# Patient Record
Sex: FEMALE | Race: WHITE | NOT HISPANIC OR LATINO | Employment: OTHER | URBAN - METROPOLITAN AREA
[De-identification: names, ages, dates, MRNs, and addresses within clinical notes are randomized per-mention and may not be internally consistent; named-entity substitution may affect disease eponyms.]

---

## 2017-01-30 ENCOUNTER — TELEPHONE (OUTPATIENT)
Dept: RHEUMATOLOGY | Facility: CLINIC | Age: 67
End: 2017-01-30

## 2017-01-30 DIAGNOSIS — M81.0 OSTEOPOROSIS: Primary | ICD-10-CM

## 2017-01-30 NOTE — TELEPHONE ENCOUNTER
----- Message from Maria C Malone sent at 1/30/2017 10:40 AM CST -----  Pt at [phone]//states she canceled her appt scheduled on 12-19-16//is needing to reschedule//please call/thanks/[facility]

## 2017-02-09 ENCOUNTER — OFFICE VISIT (OUTPATIENT)
Dept: RHEUMATOLOGY | Facility: CLINIC | Age: 67
End: 2017-02-09
Payer: MEDICARE

## 2017-02-09 ENCOUNTER — LAB VISIT (OUTPATIENT)
Dept: LAB | Facility: HOSPITAL | Age: 67
End: 2017-02-09
Attending: INTERNAL MEDICINE
Payer: MEDICARE

## 2017-02-09 VITALS
HEART RATE: 58 BPM | BODY MASS INDEX: 33.38 KG/M2 | HEIGHT: 60 IN | WEIGHT: 170 LBS | DIASTOLIC BLOOD PRESSURE: 77 MMHG | SYSTOLIC BLOOD PRESSURE: 148 MMHG

## 2017-02-09 DIAGNOSIS — M81.0 OSTEOPOROSIS: ICD-10-CM

## 2017-02-09 DIAGNOSIS — E55.9 VITAMIN D DEFICIENCY DISEASE: ICD-10-CM

## 2017-02-09 DIAGNOSIS — M81.0 OSTEOPOROSIS: Primary | ICD-10-CM

## 2017-02-09 DIAGNOSIS — Z51.81 MEDICATION MONITORING ENCOUNTER: ICD-10-CM

## 2017-02-09 DIAGNOSIS — Z79.899 HIGH RISK MEDICATION USE: ICD-10-CM

## 2017-02-09 LAB
ALBUMIN SERPL BCP-MCNC: 3.8 G/DL
ALP SERPL-CCNC: 66 U/L
ALT SERPL W/O P-5'-P-CCNC: 21 U/L
ANION GAP SERPL CALC-SCNC: 10 MMOL/L
AST SERPL-CCNC: 19 U/L
BILIRUB SERPL-MCNC: 0.7 MG/DL
BUN SERPL-MCNC: 12 MG/DL
CALCIUM SERPL-MCNC: 9.6 MG/DL
CHLORIDE SERPL-SCNC: 105 MMOL/L
CO2 SERPL-SCNC: 27 MMOL/L
CREAT SERPL-MCNC: 0.8 MG/DL
EST. GFR  (AFRICAN AMERICAN): >60 ML/MIN/1.73 M^2
EST. GFR  (NON AFRICAN AMERICAN): >60 ML/MIN/1.73 M^2
GLUCOSE SERPL-MCNC: 76 MG/DL
POTASSIUM SERPL-SCNC: 4.4 MMOL/L
PROT SERPL-MCNC: 6.8 G/DL
SODIUM SERPL-SCNC: 142 MMOL/L

## 2017-02-09 PROCEDURE — 99213 OFFICE O/P EST LOW 20 MIN: CPT | Mod: PBBFAC,PO | Performed by: PHYSICIAN ASSISTANT

## 2017-02-09 PROCEDURE — 99999 PR PBB SHADOW E&M-EST. PATIENT-LVL III: CPT | Mod: PBBFAC,,, | Performed by: PHYSICIAN ASSISTANT

## 2017-02-09 PROCEDURE — 99214 OFFICE O/P EST MOD 30 MIN: CPT | Mod: S$PBB,,, | Performed by: PHYSICIAN ASSISTANT

## 2017-02-09 PROCEDURE — 96372 THER/PROPH/DIAG INJ SC/IM: CPT | Mod: PBBFAC,PO

## 2017-02-09 RX ADMIN — DENOSUMAB 60 MG: 60 INJECTION SUBCUTANEOUS at 02:02

## 2017-02-09 NOTE — LETTER
February 9, 2017      Brad Hoyos MD  [address] [facility] [address] [place] [address]           [facility] - Rheumatology  [address]  [place] [address]  Phone: [phone]  Fax: [phone]          Patient: Rajni Melchor   MR Number: [id number]   YOB: 1950   Date of Visit: 2/9/2017       Dear Dr. Brad Hoyos:    Thank you for referring Rajni Melchor to me for evaluation. Attached you will find relevant portions of my assessment and plan of care.    If you have questions, please do not hesitate to call me. I look forward to following Rajni Melchor along with you.    Sincerely,    DANA Ortegaosure  CC:  No Recipients    If you would like to receive this communication electronically, please contact [email or web address] or [phone] to request more information on [employer] access.    For providers and/or their staff who would like to refer a patient to [facility], please contact us through our one-stop-shop provider referral line, [facility] [place], at [phone].    If you feel you have received this communication in error or would no longer like to receive these types of communications, please e-mail [email or web address]
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
## 2017-02-09 NOTE — PROGRESS NOTES
Prolia 60mg/cc to  Right lower abdomen. Labs checked: Calcium 9.8, Creatinine0.8 . Pt tolerated well. No acute reaction noted to site. Pt instructed on signs and symptoms of reaction to report. Pt verbalized understanding.     Lot:  [id number]  Exp:  4/19

## 2017-02-09 NOTE — MR AVS SNAPSHOT
[facility] Rheumatology  [address] [place] [address]  Phone: [phone]  Fax: [phone]                  Rajni Melchor   2017 1:00 PM   Office Visit    Description:  Female : 1950   Provider:  Anamika Lorenzo PA-C   Department:  [facility] - Rheumatology           Reason for Visit     Osteoporosis     Vitamin D Deficiency           Diagnoses this Visit        Comments    Osteoporosis    -  Primary     Vitamin D deficiency disease         Medication monitoring encounter                To Do List           Future Appointments        Provider Department Dept Phone    8/15/2017 10:10 AM LABORATORY, [facility] [phone]    8/15/2017 10:30 AM Anamika Lorenzo PA-C [facility] [phone]      Goals (5 Years of Data)     None      [facility] On Call     [facility] On Call Nurse Care Line -  Assistance  Registered nurses in the [facility] Center provide clinical advisement, health education, appointment booking, and other advisory services.  Call for this free service at [phone].             Medications           Message regarding Medications     Verify the changes and/or additions to your medication regime listed below are the same as discussed with your clinician today.  If any of these changes or additions are incorrect, please notify your healthcare provider.        STOP taking these medications     DENOSUMAB (PROLIA SUBQ) Inject into the skin every 6 (six) months.    denosumab (PROLIA) injection 60 mg            Verify that the below list of medications is an accurate representation of the medications you are currently taking.  If none reported, the list may be blank. If incorrect, please contact your healthcare provider. Carry this list with you in case of emergency.           Current Medications     calcium-magnesium-zinc 333-133-5 mg Tab Take 1,200 mg by mouth.    ergocalciferol (ERGOCALCIFEROL) 50,000 unit Cap 1 Capsule by mouth  "weekly    fexofenadine (ALLEGRA) 180 MG tablet 1 Tablet(s) Oral PRN Every day.    fluticasone (FLONASE) 50 mcg/actuation nasal spray 2 sprays by Each Nare route once daily.    levothyroxine (SYNTHROID) 50 MCG tablet Take 1 tablet (50 mcg total) by mouth once daily.           Clinical Reference Information           Your Vitals Were     BP Pulse Height Weight BMI    148/77 58 4' 11.5" (1.511 m) 77.1 kg (169 lb 15.6 oz) 33.76 kg/m2      Blood Pressure          Most Recent Value    BP  (!)  148/77      Allergies as of 2/9/2017     Augmentin [Amoxicillin-pot Clavulanate]    Adhesive      Immunizations Administered on Date of Encounter - 2/9/2017     None      Orders Placed During Today's Visit      Normal Orders This Visit    Prior Authorization Order     Recurring Lab Work Interval Expires    Comprehensive metabolic panel   2/9/2018    Vitamin D   4/10/2018      Instructions      Prolia  Denosumab injection  What is this medicine?  DENOSUMAB (den oh martha mab) slows bone breakdown. Prolia is used to treat osteoporosis in women after menopause and in men. Xgeva is used to prevent bone fractures and other bone problems caused by cancer bone metastases. Xgeva is also used to treat giant cell tumor of the bone.  How should I use this medicine?  This medicine is for injection under the skin. It is given by a health care professional in a hospital or clinic setting.  If you are getting Prolia, a special MedGuide will be given to you by the pharmacist with each prescription and refill. Be sure to read this information carefully each time.  For Prolia, talk to your pediatrician regarding the use of this medicine in children. Special care may be needed. For Xgeva, talk to your pediatrician regarding the use of this medicine in children. While this drug may be prescribed for children as young as 13 years for selected conditions, precautions do apply.  What side effects may I notice from receiving this medicine?  Side effects that " you should report to your doctor or health care professional as soon as possible:  · allergic reactions like skin rash, itching or hives, swelling of the face, lips, or tongue  · breathing problems  · chest pain  · fast, irregular heartbeat  · feeling faint or lightheaded, falls  · fever, chills, or any other sign of infection  · muscle spasms, tightening, or twitches  · numbness or tingling  · skin blisters or bumps, or is dry, peels, or red  · slow healing or unexplained pain in the mouth or jaw  · unusual bleeding or bruising  Side effects that usually do not require medical attention (Report these to your doctor or health care professional if they continue or are bothersome.):  · muscle pain  · stomach upset, gas  What may interact with this medicine?  Do not take this medicine with any of the following medications:  · other medicines containing denosumab  This medicine may also interact with the following medications:  · medicines that suppress the immune system  · medicines that treat cancer  · steroid medicines like prednisone or cortisone  What if I miss a dose?  It is important not to miss your dose. Call your doctor or health care professional if you are unable to keep an appointment.  Where should I keep my medicine?  This medicine is only given in a clinic, doctor's office, or other health care setting and will not be stored at home.  What should I tell my health care provider before I take this medicine?  They need to know if you have any of these conditions:  · dental disease  · eczema  · infection or history of infections  · kidney disease or on dialysis  · low blood calcium or vitamin D  · malabsorption syndrome  · scheduled to have surgery or tooth extraction  · taking medicine that contains denosumab  · thyroid or parathyroid disease  · an unusual reaction to denosumab, other medicines, foods, dyes, or preservatives  · pregnant or trying to get pregnant  · breast-feeding  What should I watch for  while using this medicine?  Visit your doctor or health care professional for regular checks on your progress. Your doctor or health care professional may order blood tests and other tests to see how you are doing.  Call your doctor or health care professional if you get a cold or other infection while receiving this medicine. Do not treat yourself. This medicine may decrease your body's ability to fight infection.  You should make sure you get enough calcium and vitamin D while you are taking this medicine, unless your doctor tells you not to. Discuss the foods you eat and the vitamins you take with your health care professional.  See your dentist regularly. Brush and floss your teeth as directed. Before you have any dental work done, tell your dentist you are receiving this medicine.  Do not become pregnant while taking this medicine or for 5 months after stopping it. Women should inform their doctor if they wish to become pregnant or think they might be pregnant. There is a potential for serious side effects to an unborn child. Talk to your health care professional or pharmacist for more information.  Date Last Reviewed:   NOTE:This sheet is a summary. It may not cover all possible information. If you have questions about this medicine, talk to your doctor, pharmacist, or health care provider. Copyright© 2016 [employer]        Continue Vit D 50,000 IU once weekly  No need to add extra calcium supplement, what you are getting in your diet it adequate               Language Assistance Services     ATTENTION: Language assistance services are available, free of charge. Please call [phone].      ATENCIÓN: Si darrickla zena, tiene a cuellar disposición servicios gratuitos de asistencia lingüística. Llame al [phone].     [facility] Ý: N?u b?n nói Ti?ng Vi?t, có các d?ch v? h? tr? ngôn ng? mi?n phí dành cho b?n. G?i s? [phone].         [facility] - [facility] complies with applicable Federal civil rights laws and  does not discriminate on the basis of race, color, national origin, age, disability, or sex.

## 2017-02-09 NOTE — PATIENT INSTRUCTIONS
Prolia  Denosumab injection  What is this medicine?  DENOSUMAB (den oh martha mab) slows bone breakdown. Prolia is used to treat osteoporosis in women after menopause and in men. Xgeva is used to prevent bone fractures and other bone problems caused by cancer bone metastases. Xgeva is also used to treat giant cell tumor of the bone.  How should I use this medicine?  This medicine is for injection under the skin. It is given by a health care professional in a hospital or clinic setting.  If you are getting Prolia, a special MedGuide will be given to you by the pharmacist with each prescription and refill. Be sure to read this information carefully each time.  For Prolia, talk to your pediatrician regarding the use of this medicine in children. Special care may be needed. For Xgeva, talk to your pediatrician regarding the use of this medicine in children. While this drug may be prescribed for children as young as 13 years for selected conditions, precautions do apply.  What side effects may I notice from receiving this medicine?  Side effects that you should report to your doctor or health care professional as soon as possible:  · allergic reactions like skin rash, itching or hives, swelling of the face, lips, or tongue  · breathing problems  · chest pain  · fast, irregular heartbeat  · feeling faint or lightheaded, falls  · fever, chills, or any other sign of infection  · muscle spasms, tightening, or twitches  · numbness or tingling  · skin blisters or bumps, or is dry, peels, or red  · slow healing or unexplained pain in the mouth or jaw  · unusual bleeding or bruising  Side effects that usually do not require medical attention (Report these to your doctor or health care professional if they continue or are bothersome.):  · muscle pain  · stomach upset, gas  What may interact with this medicine?  Do not take this medicine with any of the following medications:  · other medicines containing denosumab  This medicine  may also interact with the following medications:  · medicines that suppress the immune system  · medicines that treat cancer  · steroid medicines like prednisone or cortisone  What if I miss a dose?  It is important not to miss your dose. Call your doctor or health care professional if you are unable to keep an appointment.  Where should I keep my medicine?  This medicine is only given in a clinic, doctor's office, or other health care setting and will not be stored at home.  What should I tell my health care provider before I take this medicine?  They need to know if you have any of these conditions:  · dental disease  · eczema  · infection or history of infections  · kidney disease or on dialysis  · low blood calcium or vitamin D  · malabsorption syndrome  · scheduled to have surgery or tooth extraction  · taking medicine that contains denosumab  · thyroid or parathyroid disease  · an unusual reaction to denosumab, other medicines, foods, dyes, or preservatives  · pregnant or trying to get pregnant  · breast-feeding  What should I watch for while using this medicine?  Visit your doctor or health care professional for regular checks on your progress. Your doctor or health care professional may order blood tests and other tests to see how you are doing.  Call your doctor or health care professional if you get a cold or other infection while receiving this medicine. Do not treat yourself. This medicine may decrease your body's ability to fight infection.  You should make sure you get enough calcium and vitamin D while you are taking this medicine, unless your doctor tells you not to. Discuss the foods you eat and the vitamins you take with your health care professional.  See your dentist regularly. Brush and floss your teeth as directed. Before you have any dental work done, tell your dentist you are receiving this medicine.  Do not become pregnant while taking this medicine or for 5 months after stopping it. Women  should inform their doctor if they wish to become pregnant or think they might be pregnant. There is a potential for serious side effects to an unborn child. Talk to your health care professional or pharmacist for more information.  Date Last Reviewed:   NOTE:This sheet is a summary. It may not cover all possible information. If you have questions about this medicine, talk to your doctor, pharmacist, or health care provider. Copyright© 2016 [employer]        Continue Vit D 50,000 IU once weekly  No need to add extra calcium supplement, what you are getting in your diet it adequate

## 2017-02-20 ENCOUNTER — LAB VISIT (OUTPATIENT)
Dept: LAB | Facility: HOSPITAL | Age: 67
End: 2017-02-20
Attending: FAMILY MEDICINE
Payer: MEDICARE

## 2017-02-20 ENCOUNTER — OFFICE VISIT (OUTPATIENT)
Dept: INTERNAL MEDICINE | Facility: CLINIC | Age: 67
End: 2017-02-20
Payer: MEDICARE

## 2017-02-20 VITALS
WEIGHT: 171.75 LBS | HEIGHT: 59 IN | DIASTOLIC BLOOD PRESSURE: 76 MMHG | BODY MASS INDEX: 34.62 KG/M2 | TEMPERATURE: 98 F | HEART RATE: 72 BPM | SYSTOLIC BLOOD PRESSURE: 136 MMHG

## 2017-02-20 DIAGNOSIS — E78.5 HYPERLIPIDEMIA, UNSPECIFIED HYPERLIPIDEMIA TYPE: ICD-10-CM

## 2017-02-20 DIAGNOSIS — L98.9 EXTERNAL NASAL LESION: ICD-10-CM

## 2017-02-20 DIAGNOSIS — Z12.31 ENCOUNTER FOR SCREENING MAMMOGRAM FOR BREAST CANCER: ICD-10-CM

## 2017-02-20 DIAGNOSIS — M81.0 OSTEOPOROSIS: ICD-10-CM

## 2017-02-20 DIAGNOSIS — E55.9 VITAMIN D DEFICIENCY DISEASE: ICD-10-CM

## 2017-02-20 DIAGNOSIS — B00.9 HSV (HERPES SIMPLEX VIRUS) INFECTION: ICD-10-CM

## 2017-02-20 DIAGNOSIS — E03.9 HYPOTHYROIDISM, UNSPECIFIED TYPE: ICD-10-CM

## 2017-02-20 DIAGNOSIS — R03.0 ELEVATED BLOOD PRESSURE READING: ICD-10-CM

## 2017-02-20 DIAGNOSIS — B00.9 HSV (HERPES SIMPLEX VIRUS) INFECTION: Primary | ICD-10-CM

## 2017-02-20 LAB
25(OH)D3+25(OH)D2 SERPL-MCNC: 24 NG/ML
ALBUMIN SERPL BCP-MCNC: 3.8 G/DL
ALP SERPL-CCNC: 76 U/L
ALT SERPL W/O P-5'-P-CCNC: 33 U/L
ANION GAP SERPL CALC-SCNC: 6 MMOL/L
AST SERPL-CCNC: 27 U/L
BASOPHILS # BLD AUTO: 0.03 K/UL
BASOPHILS NFR BLD: 0.5 %
BILIRUB SERPL-MCNC: 0.5 MG/DL
BUN SERPL-MCNC: 10 MG/DL
CALCIUM SERPL-MCNC: 9 MG/DL
CHLORIDE SERPL-SCNC: 109 MMOL/L
CHOLEST/HDLC SERPL: 2.4 {RATIO}
CO2 SERPL-SCNC: 27 MMOL/L
CREAT SERPL-MCNC: 0.9 MG/DL
DIFFERENTIAL METHOD: NORMAL
EOSINOPHIL # BLD AUTO: 0.2 K/UL
EOSINOPHIL NFR BLD: 3.5 %
ERYTHROCYTE [DISTWIDTH] IN BLOOD BY AUTOMATED COUNT: 13 %
EST. GFR  (AFRICAN AMERICAN): >60 ML/MIN/1.73 M^2
EST. GFR  (NON AFRICAN AMERICAN): >60 ML/MIN/1.73 M^2
GLUCOSE SERPL-MCNC: 84 MG/DL
HCT VFR BLD AUTO: 42.8 %
HDL/CHOLESTEROL RATIO: 41.7 %
HDLC SERPL-MCNC: 199 MG/DL
HDLC SERPL-MCNC: 83 MG/DL
HGB BLD-MCNC: 14 G/DL
LDLC SERPL CALC-MCNC: 105.6 MG/DL
LYMPHOCYTES # BLD AUTO: 1.7 K/UL
LYMPHOCYTES NFR BLD: 28.8 %
MCH RBC QN AUTO: 30.9 PG
MCHC RBC AUTO-ENTMCNC: 32.7 %
MCV RBC AUTO: 95 FL
MONOCYTES # BLD AUTO: 0.3 K/UL
MONOCYTES NFR BLD: 5.4 %
NEUTROPHILS # BLD AUTO: 3.6 K/UL
NEUTROPHILS NFR BLD: 61.6 %
NONHDLC SERPL-MCNC: 116 MG/DL
PLATELET # BLD AUTO: 209 K/UL
PMV BLD AUTO: 10.4 FL
POTASSIUM SERPL-SCNC: 5.2 MMOL/L
PROT SERPL-MCNC: 7.3 G/DL
RBC # BLD AUTO: 4.53 M/UL
SODIUM SERPL-SCNC: 142 MMOL/L
T4 FREE SERPL-MCNC: 1.26 NG/DL
TRIGL SERPL-MCNC: 52 MG/DL
TSH SERPL DL<=0.005 MIU/L-ACNC: 2.6 UIU/ML
WBC # BLD AUTO: 5.76 K/UL

## 2017-02-20 PROCEDURE — 80053 COMPREHEN METABOLIC PANEL: CPT

## 2017-02-20 PROCEDURE — 99999 PR PBB SHADOW E&M-EST. PATIENT-LVL III: CPT | Mod: PBBFAC,,, | Performed by: FAMILY MEDICINE

## 2017-02-20 PROCEDURE — 36415 COLL VENOUS BLD VENIPUNCTURE: CPT | Mod: PO

## 2017-02-20 PROCEDURE — 85025 COMPLETE CBC W/AUTO DIFF WBC: CPT

## 2017-02-20 PROCEDURE — 80061 LIPID PANEL: CPT

## 2017-02-20 PROCEDURE — 82306 VITAMIN D 25 HYDROXY: CPT

## 2017-02-20 PROCEDURE — 99214 OFFICE O/P EST MOD 30 MIN: CPT | Mod: S$PBB,,, | Performed by: FAMILY MEDICINE

## 2017-02-20 PROCEDURE — 84443 ASSAY THYROID STIM HORMONE: CPT

## 2017-02-20 PROCEDURE — 84439 ASSAY OF FREE THYROXINE: CPT

## 2017-02-20 RX ORDER — ACYCLOVIR 400 MG/1
400 TABLET ORAL
Qty: 60 TABLET | Refills: 3 | Status: SHIPPED | OUTPATIENT
Start: 2017-02-20 | End: 2019-05-20 | Stop reason: SDUPTHER
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
## 2017-03-16 ENCOUNTER — PATIENT OUTREACH (OUTPATIENT)
Dept: ADMINISTRATIVE | Facility: HOSPITAL | Age: 67
End: 2017-03-16

## 2017-03-16 NOTE — PROGRESS NOTES
CHART AUDIT COMPLETED. PORTAL MESSAGE SENT TO PATIENT TO INFORM HIM OF OVERDUE HEALTH MAINTENANCE.

## 2017-03-27 ENCOUNTER — OFFICE VISIT (OUTPATIENT)
Dept: INTERNAL MEDICINE | Facility: CLINIC | Age: 67
End: 2017-03-27
Payer: MEDICARE

## 2017-03-27 VITALS
SYSTOLIC BLOOD PRESSURE: 112 MMHG | TEMPERATURE: 97 F | WEIGHT: 168.19 LBS | DIASTOLIC BLOOD PRESSURE: 70 MMHG | HEART RATE: 90 BPM | HEIGHT: 60 IN | BODY MASS INDEX: 33.02 KG/M2

## 2017-03-27 DIAGNOSIS — E78.5 HYPERLIPIDEMIA, UNSPECIFIED HYPERLIPIDEMIA TYPE: ICD-10-CM

## 2017-03-27 DIAGNOSIS — M81.0 OSTEOPOROSIS: ICD-10-CM

## 2017-03-27 DIAGNOSIS — E55.9 VITAMIN D DEFICIENCY DISEASE: ICD-10-CM

## 2017-03-27 DIAGNOSIS — E03.9 HYPOTHYROIDISM, UNSPECIFIED TYPE: ICD-10-CM

## 2017-03-27 DIAGNOSIS — M46.1 SACROILIITIS: Primary | ICD-10-CM

## 2017-03-27 PROCEDURE — 99213 OFFICE O/P EST LOW 20 MIN: CPT | Mod: PBBFAC,PO | Performed by: FAMILY MEDICINE

## 2017-03-27 PROCEDURE — 99999 PR PBB SHADOW E&M-EST. PATIENT-LVL III: CPT | Mod: PBBFAC,,, | Performed by: FAMILY MEDICINE

## 2017-03-27 PROCEDURE — 99214 OFFICE O/P EST MOD 30 MIN: CPT | Mod: S$PBB,,, | Performed by: FAMILY MEDICINE

## 2017-03-27 RX ORDER — LEVOTHYROXINE SODIUM 50 UG/1
50 TABLET ORAL DAILY
Qty: 90 TABLET | Refills: 3 | Status: SHIPPED | OUTPATIENT
Start: 2017-03-27 | End: 2018-02-14 | Stop reason: SDUPTHER

## 2017-03-27 RX ORDER — ACETAMINOPHEN 500 MG
1 TABLET ORAL DAILY
Qty: 100 CAPSULE | Refills: 3 | Status: SHIPPED | OUTPATIENT
Start: 2017-03-27

## 2017-03-27 NOTE — MR AVS SNAPSHOT
[facility]  [address] [place] [address]  Phone: [phone]  Fax: [phone]                  Rajni Melchor   3/27/2017 10:00 AM   Office Visit    Description:  Female : 1950   Provider:  Kylah Benedict MD   [facility]           Reason for Visit     Medicare AWV     Sciatica     Leg Pain           Diagnoses this Visit        Comments    Hypothyroidism, unspecified type    -  Primary stable, labs reviewed. cont with meds.     Vitamin D deficiency disease     no change at 24. start vit D 3, 2502-5371 daily.  labs reviewed.     Osteoporosis     continue with prolia. continue with walking.     Hyperlipidemia, unspecified hyperlipidemia type                To Do List           Future Appointments        Provider Department Dept Phone    3/27/2017 10:00 AM [facility]ylah Benedict [facility] [phone]    8/15/2017 8:00 AM [place] MAMMO1-SCR [facility] [phone]    8/15/2017 9:30 AM [facility] BMD1 [facility] [phone]    8/15/2017 10:10 AM LAB[place]AT[place], [place] [facility] [phone]    8/15/2017 10:30 AM Anamika Lorenzo PA-C [facility] [phone]      Goals (5 Years of Data)     None      Follow-Up and Disposition     Return in about 1 year (around 3/27/2018) for physical.       These Medications        Disp Refills Start End    levothyroxine (SYNTHROID) 50 MCG tablet 90 tablet 3 3/27/2017 3/27/2018    Take 1 tablet (50 mcg total) by mouth once daily. - Oral    Pharmacy: [facility] [address] Ph #: [phone]       cholecalciferol, vitamin D3, (VITAMIN D3) 2,000 unit Cap 100 capsule 3 3/27/2017     Take 1 capsule (2,000 Units total) by mouth once daily. - Oral    Pharmacy: [facility] [place], [place] - [address] Ph #: [phone]         [place] On Call     [facility] Care Line -   "Assistance  Registered nurses in the [facility] provide clinical advisement, health education, appointment booking, and other advisory services.  Call for this free service at [phone].             Medications           Message regarding Medications     Verify the changes and/or additions to your medication regime listed below are the same as discussed with your clinician today.  If any of these changes or additions are incorrect, please notify your healthcare provider.        START taking these NEW medications        Refills    cholecalciferol, vitamin D3, (VITAMIN D3) 2,000 unit Cap 3    Sig: Take 1 capsule (2,000 Units total) by mouth once daily.    Class: Normal    Route: Oral      STOP taking these medications     ergocalciferol (ERGOCALCIFEROL) 50,000 unit Cap 1 Capsule by mouth weekly           Verify that the below list of medications is an accurate representation of the medications you are currently taking.  If none reported, the list may be blank. If incorrect, please contact your healthcare provider. Carry this list with you in case of emergency.           Current Medications     acyclovir (ZOVIRAX) 400 MG tablet Take 1 tablet (400 mg total) by mouth 5 (five) times daily. For 3 days for fever blisters    calcium-magnesium-zinc 333-133-5 mg Tab Take 1,200 mg by mouth.    cholecalciferol, vitamin D3, (VITAMIN D3) 2,000 unit Cap Take 1 capsule (2,000 Units total) by mouth once daily.    fexofenadine (ALLEGRA) 180 MG tablet 1 Tablet(s) Oral PRN Every day.    fluticasone (FLONASE) 50 mcg/actuation nasal spray 2 sprays by Each Nare route once daily.    levothyroxine (SYNTHROID) 50 MCG tablet Take 1 tablet (50 mcg total) by mouth once daily.           Clinical Reference Information           Your Vitals Were     BP Pulse Temp Height Weight BMI    112/70 90 97.4 °F (36.3 °C) 4' 11.5" (1.511 m) 76.3 kg (168 lb 3.4 oz) 33.41 kg/m2      Blood Pressure          Most Recent Value    BP  112/70    "   Allergies as of 3/27/2017     Augmentin [Amoxicillin-pot Clavulanate]    Adhesive      Immunizations Administered on Date of Encounter - 3/27/2017     None      Orders Placed During Today's Visit     Future Labs/Procedures Expected by Expires    CBC auto differential  3/27/2018 5/1/2018    Comprehensive metabolic panel  3/27/2018 5/1/2018    Lipid panel  3/27/2018 5/1/2018    T4, free  3/27/2018 5/1/2018    TSH  3/27/2018 5/1/2018    Vitamin D  3/27/2018 (Approximate) 5/1/2018      Language Assistance Services     ATTENTION: Language assistance services are available, free of charge. Please call [phone].      ATENCIÓN: Si habla zena, tiene a cuellar disposición servicios gratuitos de asistencia lingüística. Llame al [phone].     CHÚ Ý: N?u b?n nói Ti?ng Vi?t, có các d?ch v? h? tr? ngôn ng? mi?n phí dành cho b?n. G?i s? [phone].         [facility] complies with applicable Federal civil rights laws and does not discriminate on the basis of race, color, national origin, age, disability, or sex.

## 2017-03-28 ENCOUNTER — TELEPHONE (OUTPATIENT)
Dept: INTERNAL MEDICINE | Facility: CLINIC | Age: 67
End: 2017-03-28

## 2017-03-28 PROBLEM — M46.1 SACROILIITIS: Status: ACTIVE | Noted: 2017-03-28

## 2017-03-28 PROBLEM — E78.5 HYPERLIPIDEMIA: Status: ACTIVE | Noted: 2017-03-28

## 2017-03-28 RX ORDER — PERMETHRIN 50 MG/G
CREAM TOPICAL
Qty: 60 G | Refills: 1 | Status: SHIPPED | OUTPATIENT
Start: 2017-03-28 | End: 2017-08-15

## 2017-03-28 NOTE — TELEPHONE ENCOUNTER
Pt is stating that she was around her grandson who has now been diagnosed with scabies. She is wanting to know if she needs to do preventative treatment or what she needs to do?

## 2017-03-28 NOTE — TELEPHONE ENCOUNTER
----- Message from Joseang Douglasay sent at 3/28/2017  3:37 PM CDT -----  Contact: Patient  Please call pt back regarding questions on how to proceed. Pt states that she was exposed to her grandson who has scabies and needs to know if something needs to be called in or she needs an appt.  Please call pt back @ ..[phone] (home) Thank you/NH

## 2017-03-29 NOTE — PROGRESS NOTES
Subjective:      Patient ID: Rajni Melchor is a 67 y.o. female.    Chief Complaint: Medicare AWV; Sciatica; and Leg Pain (left)    HPI Comments: Patient's coming in today for follow-up of chronic issues.  Since I last saw her she's been walking a bit more.  Try to get about 10,000 steps.  She does report that her lower back and hurting her some with some SI joint pain.  If she massages it seems to get better.  She's actually down 3 pounds since I last saw her.  She does report when she lays on her left side sometimes she gets some pain going down her leg otherwise if she massages it because way.  She also occasionally describes a picking feeling and feeling sometimes a little bit more shaky.    From a vitamin D standpoint she's currently doing 5000 units a day.  She does have a history of osteoporosis.  She's trying to get back more exercise.    She also normally gets a good bit of sun exposure but because of her fever blister she's been trying to limit this.    She also has a history of hypothyroidism for which we are reviewing labs today.  She's currently stable with her current supplementation.    Leg Pain          Lab Results   Component Value Date    WBC 5.76 02/20/2017    HGB 14.0 02/20/2017    HCT 42.8 02/20/2017     02/20/2017    CHOL 199 02/20/2017    TRIG 52 02/20/2017    HDL 83 (H) 02/20/2017    ALT 33 02/20/2017    AST 27 02/20/2017     02/20/2017    K 5.2 (H) 02/20/2017     02/20/2017    CREATININE 0.9 02/20/2017    BUN 10 02/20/2017    CO2 27 02/20/2017    TSH 2.603 02/20/2017    HGBA1C 5.5 01/23/2013       Review of Systems   Constitutional: Negative for chills, fatigue and fever.   HENT: Negative for ear pain and trouble swallowing.    Eyes: Negative for pain and visual disturbance.   Respiratory: Negative for cough and shortness of breath.    Cardiovascular: Negative for chest pain and leg swelling.   Gastrointestinal: Negative for abdominal pain, blood in stool, nausea and  vomiting.   Endocrine: Negative for cold intolerance and heat intolerance.   Genitourinary: Negative for dysuria and frequency.   Musculoskeletal: Positive for arthralgias and back pain. Negative for joint swelling, myalgias and neck pain.   Skin: Negative for color change and rash.   Neurological: Negative for dizziness and headaches.   Psychiatric/Behavioral: Negative for behavioral problems and sleep disturbance.     Objective:     Physical Exam   Constitutional: She is oriented to person, place, and time. She appears well-developed and well-nourished.   HENT:   Head: Normocephalic and atraumatic.   Right Ear: External ear normal.   Left Ear: External ear normal.   Mouth/Throat: Oropharynx is clear and moist.   Eyes: EOM are normal.   Neck: Normal range of motion. Neck supple. No thyromegaly present.   Cardiovascular: Normal rate and regular rhythm.  Exam reveals no gallop and no friction rub.    No murmur heard.  Pulmonary/Chest: Effort normal. No respiratory distress. She has no wheezes. She has no rales.   Abdominal: Soft. Bowel sounds are normal. She exhibits no distension. There is no tenderness. There is no rebound.   Musculoskeletal: Normal range of motion. She exhibits no edema.        Lumbar back: She exhibits tenderness, pain and spasm. She exhibits normal range of motion and no bony tenderness.        Back:    Lymphadenopathy:     She has no cervical adenopathy.   Neurological: She is alert and oriented to person, place, and time.   Skin: Skin is warm and dry. No rash noted.   Psychiatric: She has a normal mood and affect. Her behavior is normal. Judgment and thought content normal.   Vitals reviewed.    Assessment:     1. Sacroiliitis    2. Hypothyroidism, unspecified type    3. Vitamin D deficiency disease    4. Osteoporosis    5. Hyperlipidemia, unspecified hyperlipidemia type      Plan:   Rajni was seen today for medicare awv, sciatica and leg pain.    Diagnoses and all orders for this  visit:    Sacroiliitis-new, recurrent advised patient continue working on walking and massage probably aggravated most recently due to the walking.  Continue to work on weight loss.  Can use an occasional anti-inflammatory for necessary.  Demonstrated stretching exercises.    Hypothyroidism, unspecified type  Comments:  stable, labs reviewed. cont with meds.   Orders:  -     Vitamin D; Future  -     TSH; Future  -     T4, free; Future  -     Comprehensive metabolic panel; Future  -     CBC auto differential; Future  -     Lipid panel; Future    Vitamin D deficiency disease  Comments:  no change at 24. start vit D 3, 3627-9130 daily.  labs reviewed.   Orders:  -     Vitamin D; Future  -     TSH; Future  -     T4, free; Future  -     Comprehensive metabolic panel; Future  -     CBC auto differential; Future  -     Lipid panel; Future    Osteoporosis  Comments:  continue with prolia. continue with walking.   Orders:  -     Vitamin D; Future  -     TSH; Future  -     T4, free; Future  -     Comprehensive metabolic panel; Future  -     CBC auto differential; Future  -     Lipid panel; Future    Hyperlipidemia, unspecified hyperlipidemia type-schedule labs, discussed health maintenance issues and working on diet therapy  -     Vitamin D; Future  -     TSH; Future  -     T4, free; Future  -     Comprehensive metabolic panel; Future  -     CBC auto differential; Future  -     Lipid panel; Future    Other orders  -     levothyroxine (SYNTHROID) 50 MCG tablet; Take 1 tablet (50 mcg total) by mouth once daily.  -     cholecalciferol, vitamin D3, (VITAMIN D3) 2,000 unit Cap; Take 1 capsule (2,000 Units total) by mouth once daily.            Return in about 1 year (around 3/27/2018) for physical.

## 2017-07-13 DIAGNOSIS — J30.9 ALLERGIC RHINITIS: ICD-10-CM

## 2017-07-13 RX ORDER — FLUTICASONE PROPIONATE 50 MCG
SPRAY, SUSPENSION (ML) NASAL
Qty: 16 G | Refills: 3 | Status: SHIPPED | OUTPATIENT
Start: 2017-07-13 | End: 2018-07-23 | Stop reason: SDUPTHER

## 2017-07-26 ENCOUNTER — NURSE TRIAGE (OUTPATIENT)
Dept: ADMINISTRATIVE | Facility: CLINIC | Age: 67
End: 2017-07-26

## 2017-07-26 ENCOUNTER — HOSPITAL ENCOUNTER (OUTPATIENT)
Dept: RADIOLOGY | Facility: HOSPITAL | Age: 67
Discharge: HOME | End: 2017-07-26
Attending: NURSE PRACTITIONER
Payer: MEDICARE

## 2017-07-26 ENCOUNTER — OFFICE VISIT (OUTPATIENT)
Dept: URGENT CARE | Facility: CLINIC | Age: 67
End: 2017-07-26
Payer: MEDICARE

## 2017-07-26 VITALS
OXYGEN SATURATION: 98 % | TEMPERATURE: 98 F | HEIGHT: 60 IN | SYSTOLIC BLOOD PRESSURE: 124 MMHG | DIASTOLIC BLOOD PRESSURE: 78 MMHG | WEIGHT: 171.31 LBS | HEART RATE: 66 BPM | BODY MASS INDEX: 33.63 KG/M2

## 2017-07-26 DIAGNOSIS — M46.1 SACROILIITIS: Primary | ICD-10-CM

## 2017-07-26 DIAGNOSIS — M46.1 SACROILIITIS: ICD-10-CM

## 2017-07-26 PROCEDURE — 72100 X-RAY EXAM L-S SPINE 2/3 VWS: CPT | Mod: 26,,, | Performed by: RADIOLOGY

## 2017-07-26 PROCEDURE — 72100 X-RAY EXAM L-S SPINE 2/3 VWS: CPT | Mod: TC,PO

## 2017-07-26 PROCEDURE — 1125F AMNT PAIN NOTED PAIN PRSNT: CPT | Mod: ,,, | Performed by: NURSE PRACTITIONER

## 2017-07-26 PROCEDURE — 99214 OFFICE O/P EST MOD 30 MIN: CPT | Mod: 25,S$PBB,, | Performed by: NURSE PRACTITIONER

## 2017-07-26 PROCEDURE — 1159F MED LIST DOCD IN RCRD: CPT | Mod: ,,, | Performed by: NURSE PRACTITIONER

## 2017-07-26 PROCEDURE — 99999 PR PBB SHADOW E&M-EST. PATIENT-LVL V: CPT | Mod: PBBFAC,,, | Performed by: NURSE PRACTITIONER

## 2017-07-26 RX ORDER — METHYLPREDNISOLONE 4 MG/1
TABLET ORAL
Qty: 1 PACKAGE | Refills: 0 | Status: SHIPPED | OUTPATIENT
Start: 2017-07-26 | End: 2017-08-07

## 2017-07-26 RX ORDER — KETOROLAC TROMETHAMINE 30 MG/ML
30 INJECTION, SOLUTION INTRAMUSCULAR; INTRAVENOUS
Status: COMPLETED | OUTPATIENT
Start: 2017-07-26 | End: 2017-07-26

## 2017-07-26 RX ADMIN — KETOROLAC TROMETHAMINE 30 MG: 30 INJECTION, SOLUTION INTRAMUSCULAR at 10:07

## 2017-07-26 NOTE — PROGRESS NOTES
"Subjective:       Patient ID: Rajni Melchor is a 67 y.o. female.    Chief Complaint: Hip Pain    HPI  Rajni presents to urgent care with complaints of left lower back pain which radiates down the left hip to the left ankle. This has been a chronic issue but it flared up again two weeks ago when she was dancing with her granddaughter. Her left knee is also hurting. She thinks she may have twisted it at that time. There is no pain with walking and no gait disturbance.   /78 (BP Location: Left arm, Patient Position: Sitting, BP Method: Manual)   Pulse 66   Temp 97.5 °F (36.4 °C) (Tympanic)   Ht 4' 11.5" (1.511 m)   Wt 77.7 kg (171 lb 4.8 oz)   SpO2 98%   BMI 34.02 kg/m²     Review of Systems   Constitutional: Negative for chills and fever.   HENT: Negative for congestion.    Respiratory: Negative for cough and shortness of breath.    Gastrointestinal: Negative for nausea and vomiting.   Musculoskeletal: Positive for back pain. Negative for gait problem, joint swelling and neck pain.   Skin: Negative for rash and wound.   Allergic/Immunologic: Negative for immunocompromised state.   Neurological: Negative for headaches.   Hematological: Does not bruise/bleed easily.   Psychiatric/Behavioral: Negative for behavioral problems and confusion.       Objective:      Physical Exam   Constitutional: She is oriented to person, place, and time. She appears well-developed and well-nourished.   Eyes: Conjunctivae and EOM are normal.   Neck: Normal range of motion.   Cardiovascular: Normal rate.    Pulmonary/Chest: Effort normal. No respiratory distress.   Musculoskeletal: Normal range of motion. She exhibits tenderness. She exhibits no edema or deformity.        Left knee: She exhibits normal range of motion, no swelling, no effusion, no erythema and normal alignment. Tenderness found. Medial joint line tenderness noted.        Lumbar back: She exhibits tenderness and pain. She exhibits no bony tenderness, no " swelling and no edema.        Back:    There is pain with knee flexion   Neurological: She is alert and oriented to person, place, and time.   Skin: Skin is warm and dry.   Psychiatric: She has a normal mood and affect. Her behavior is normal.   Vitals reviewed.      Assessment:       1. Sacroiliitis        Plan:       Rajni was seen today for hip pain.    Diagnoses and all orders for this visit:    Sacroiliitis  -     X-Ray Lumbar Spine Complete 5 View; Future  -     ketorolac injection 30 mg; Inject 1 mL (30 mg total) into the muscle one time.  -     methylPREDNISolone (MEDROL DOSEPACK) 4 mg tablet; use as directed  -     Ambulatory Referral to Physiatry          -  Use heating pad to back for 20-30 minutes several times a day. May alternate with ice depending on what provides the best relief.  -   May try ower back exercises and stretches and gentle massage.  -   Avoid heavy lifting or any activities that aggravate back pain but continue to walk and avoid long periods of sitting.  Use knee sleeve from drug store. If not improving may need ortho consult/ MRI   Follow up with PCP in 2 weeks if no improvement or sooner if pain worsens.  Go to ER immediately if bowel/bladder incontinence, numbness/tingling in lower extremities, urinary retention or bilateral leg weakness.

## 2017-07-26 NOTE — TELEPHONE ENCOUNTER
Reason for Disposition   DOUBLE DOSE (an extra dose or lesser amount) of prescription drug and NO symptoms   [1] DOUBLE DOSE (an extra dose or lesser amount) of prescription drug AND [2] NO symptoms (Exception: a double dose of antibiotics)    Answer Assessment - Initial Assessment Questions  Pt was prescribed medrol dose pack. She just got home from dr's office and didn't read the directions. Took all 6 tabs for first day at one time. She called pharmacist who referred her to poison control so she called [facility].    Protocols used: ST POISONING-A-OH, ST MEDICATION QUESTION CALL-A-AH    Advised to f/u with call to poison control and that I would send message to her dr for any further recommendations.

## 2017-07-26 NOTE — TELEPHONE ENCOUNTER
Spoke with patient and told her everything is fine. Dose only equivalent to prednisone 30 mg. Patient verbalizes understanding and is feeling fine.

## 2017-07-26 NOTE — PATIENT INSTRUCTIONS
Possible Causes of Low Back or Leg Pain    The symptoms in your back or leg may be due to pressure on a nerve. This pressure may be caused by a damaged disk or by abnormal bone growth. Either way, you may feel pain, burning, tingling, or numbness. If you have pressure on a nerve that connects to the sciatic nerve, pain may shoot down your leg.    Pressure from the disk  Constant wear and tear can weaken a disk over time and cause back pain. The disk can then be damaged by a sudden movement or injury. If its soft center begins to bulge, the disk may press on a nerve. Or the outside of the disk may tear, and the soft center may squeeze through and pinch a nerve.    Pressure from bone  As a disk wears out, the vertebrae right above and below the disk begin to touch. This can put pressure on a nerve. Often, abnormal bone (called bone spurs) grows where the vertebrae rub against each other. This can cause the foramen or the spinal canal to narrow (called stenosis) and press against a nerve.  Date Last Reviewed: 10/4/2015  © 8879-0145 [employer]. [address] [place] [address]. All rights reserved. This information is not intended as a substitute for professional medical care. Always follow your healthcare professional's instructions.        Possible Causes of Low Back or Leg Pain    The symptoms in your back or leg may be due to pressure on a nerve. This pressure may be caused by a damaged disk or by abnormal bone growth. Either way, you may feel pain, burning, tingling, or numbness. If you have pressure on a nerve that connects to the sciatic nerve, pain may shoot down your leg.    Pressure from the disk  Constant wear and tear can weaken a disk over time and cause back pain. The disk can then be damaged by a sudden movement or injury. If its soft center begins to bulge, the disk may press on a nerve. Or the outside of the disk may tear, and the soft center may squeeze through and pinch a  nerve.    Pressure from bone  As a disk wears out, the vertebrae right above and below the disk begin to touch. This can put pressure on a nerve. Often, abnormal bone (called bone spurs) grows where the vertebrae rub against each other. This can cause the foramen or the spinal canal to narrow (called stenosis) and press against a nerve.  Date Last Reviewed: 10/4/2015  © 1929-1930 [employer]. [address] [place] [address], [place] [address]. All rights reserved. This information is not intended as a substitute for professional medical care. Always follow your healthcare professional's instructions.

## 2017-07-30 ENCOUNTER — PATIENT MESSAGE (OUTPATIENT)
Dept: INTERNAL MEDICINE | Facility: CLINIC | Age: 67
End: 2017-07-30

## 2017-08-07 ENCOUNTER — INITIAL CONSULT (OUTPATIENT)
Dept: PHYSICAL MEDICINE AND REHAB | Facility: CLINIC | Age: 67
End: 2017-08-07
Payer: MEDICARE

## 2017-08-07 VITALS
BODY MASS INDEX: 33.57 KG/M2 | DIASTOLIC BLOOD PRESSURE: 64 MMHG | SYSTOLIC BLOOD PRESSURE: 116 MMHG | HEIGHT: 60 IN | RESPIRATION RATE: 14 BRPM | WEIGHT: 171 LBS | HEART RATE: 70 BPM

## 2017-08-07 DIAGNOSIS — M47.816 SPONDYLOSIS OF LUMBAR REGION WITHOUT MYELOPATHY OR RADICULOPATHY: ICD-10-CM

## 2017-08-07 DIAGNOSIS — M51.36 DDD (DEGENERATIVE DISC DISEASE), LUMBAR: ICD-10-CM

## 2017-08-07 DIAGNOSIS — M46.1 SACROILIITIS: Primary | ICD-10-CM

## 2017-08-07 PROCEDURE — 1125F AMNT PAIN NOTED PAIN PRSNT: CPT | Mod: ,,, | Performed by: PHYSICAL MEDICINE & REHABILITATION

## 2017-08-07 PROCEDURE — 99204 OFFICE O/P NEW MOD 45 MIN: CPT | Mod: S$PBB,,, | Performed by: PHYSICAL MEDICINE & REHABILITATION

## 2017-08-07 PROCEDURE — 1159F MED LIST DOCD IN RCRD: CPT | Mod: ,,, | Performed by: PHYSICAL MEDICINE & REHABILITATION

## 2017-08-07 PROCEDURE — 99213 OFFICE O/P EST LOW 20 MIN: CPT | Mod: PBBFAC,PO | Performed by: PHYSICAL MEDICINE & REHABILITATION

## 2017-08-07 PROCEDURE — 99999 PR PBB SHADOW E&M-EST. PATIENT-LVL III: CPT | Mod: PBBFAC,,, | Performed by: PHYSICAL MEDICINE & REHABILITATION

## 2017-08-07 RX ORDER — IBUPROFEN 800 MG/1
800 TABLET ORAL 2 TIMES DAILY PRN
Qty: 60 TABLET | Refills: 1 | Status: SHIPPED | OUTPATIENT
Start: 2017-08-07 | End: 2020-11-09

## 2017-08-07 NOTE — PATIENT INSTRUCTIONS
Possible Causes of Low Back or Leg Pain    The symptoms in your back or leg may be due to pressure on a nerve. This pressure may be caused by a damaged disk or by abnormal bone growth. Either way, you may feel pain, burning, tingling, or numbness. If you have pressure on a nerve that connects to the sciatic nerve, pain may shoot down your leg.    Pressure from the disk  Constant wear and tear can weaken a disk over time and cause back pain. The disk can then be damaged by a sudden movement or injury. If its soft center begins to bulge, the disk may press on a nerve. Or the outside of the disk may tear, and the soft center may squeeze through and pinch a nerve.    Pressure from bone  As a disk wears out, the vertebrae right above and below the disk begin to touch. This can put pressure on a nerve. Often, abnormal bone (called bone spurs) grows where the vertebrae rub against each other. This can cause the foramen or the spinal canal to narrow (called stenosis) and press against a nerve.  Date Last Reviewed: 10/4/2015  © 3938-6734 [employer]. [address] [place] [address]. All rights reserved. This information is not intended as a substitute for professional medical care. Always follow your healthcare professional's instructions.        Possible Causes of Low Back or Leg Pain    The symptoms in your back or leg may be due to pressure on a nerve. This pressure may be caused by a damaged disk or by abnormal bone growth. Either way, you may feel pain, burning, tingling, or numbness. If you have pressure on a nerve that connects to the sciatic nerve, pain may shoot down your leg.    Pressure from the disk  Constant wear and tear can weaken a disk over time and cause back pain. The disk can then be damaged by a sudden movement or injury. If its soft center begins to bulge, the disk may press on a nerve. Or the outside of the disk may tear, and the soft center may squeeze through and pinch a  nerve.    Pressure from bone  As a disk wears out, the vertebrae right above and below the disk begin to touch. This can put pressure on a nerve. Often, abnormal bone (called bone spurs) grows where the vertebrae rub against each other. This can cause the foramen or the spinal canal to narrow (called stenosis) and press against a nerve.  Date Last Reviewed: 10/4/2015  © 9157-6323 [employer]. [address] [place] [address]. All rights reserved. This information is not intended as a substitute for professional medical care. Always follow your healthcare professional's instructions.        Causes of Lumbar (Low Back) Pain  Low back pain can be caused by problems with any part of the lumbar spine. A disk can herniate (push out) and press on a nerve. Vertebrae can rub against each other or slip out of place. This can irritate facet joints and nerves. It can also lead to stenosis, a narrowing of the spinal canal or foramen.  Pressure from a disk  Constant wear and tear on a disk can cause it to weaken and push outward. Part of the disk may then press on nearby nerves. There are two common types of herniated disks:  Contained means the soft nucleus is protruding outward.   Extruded means the firm annulus has torn, letting the soft center squeeze through.     Pressure from bone  An unstable spine   With age, a disk may thin and wear out. Vertebrae above and below the disk may begin to touch. This can put pressure on nerves. It can also cause bone spurs (growths) to form where the bones rub together.    Stenosis results when bone spurs narrow the foramen or spinal canal. This also puts pressure on nerves. Slipping vertebrae can irritate nerves and joints. They can also worsen stenosis.    In some cases, vertebrae become unstable and slip forward. This is called spondylolisthesis.     Date Last Reviewed: 10/12/2015  © 6194-9463 [employer]. [address] [place] [address]. All  rights reserved. This information is not intended as a substitute for professional medical care. Always follow your healthcare professional's instructions.        Relieving Back Pain  Back pain is a common problem. You can strain back muscles by lifting too much weight or just by moving the wrong way. Back strain can be uncomfortable, even painful. And it can take weeks or months to improve. To help yourself feel better and prevent future back strains, try these tips.  Important Note: Do not give aspirin to children or teens without first discussing it with your healthcare provider.      ? Ice    Ice reduces muscle pain and swelling. It helps most during the first 24 to 48 hours after an injury.  · Wrap an ice pack or a bag of frozen peas in a thin towel. (Never place ice directly on your skin.)  · Place the ice where your back hurts the most.  · Dont ice for more than 20 minutes at a time.  · You can use ice several times a day.  ? Medicines  Over-the-counter pain relievers can include acetaminophen and anti-inflammatory medicines, which includes aspirin or ibuprofen. They can help ease discomfort. Some also reduce swelling.  · Tell your healthcare provider about any medicines you are already taking.  · Take medicines only as directed.  ? Heat  After the first 48 hours, heat can relax sore muscles and improve blood flow.  · Try a warm bath or shower. Or use a heating pad set on low. To prevent a burn, keep a cloth between you and the heating pad.  · Dont use a heating pad for more than 15 minutes at a time. Never sleep on a heating pad.  Date Last Reviewed: 9/1/2015 © 2000-2016 [employer]. [address] [place] [address], [place] [address]. All rights reserved. This information is not intended as a substitute for professional medical care. Always follow your healthcare professional's instructions.        Back Safety: Poor Posture Hurts  An unhealthy spine often starts with bad habits. Poor movement  patterns and posture problems are common causes of back pain. Disk, bone, nerve, and soft tissue problems can all be affected by poor posture. They can lead to pain, stiffness, and other symptoms.    Poor posture backfires  Poor posture can cause pain. Too much slouching puts increased pressure on the disks. An excessive lumbar curve can overload and inflame the vertebrae. As a result, the back muscles may tighten or spasm to splint and protect the spine. This adds to the pain you feel.    Proper posture: The key to safe movement  Your spine bears your weight throughout the day. This is true whether youre sleeping, standing, or bending. Certain positions strain your spine more than others. But by maintaining proper posture in all positions, you can reduce the stress on your spine.       To improve your standing posture, follow these steps:  · Breathe deeply.  · Relax your shoulders, hips, and ankles. · Think of the ears, shoulders, hips, and ankles as a series of dots. Now, adjust your body to connect the dots in a straight line.  · Tuck your buttocks in just a bit if you need to.      Date Last Reviewed: 10/28/2015  © 6021-7508 [employer]. [address] [place] [address]. All rights reserved. This information is not intended as a substitute for professional medical care. Always follow your healthcare professional's instructions.        Caring for Your Back Throughout the Day  Take care of your back throughout the day. You will likely have fewer back problems if you do. Try to warm up before you move. Shift positions often. Also do your best to form healthy habits.    Warm up for the day  Do a few slow, catlike stretches before starting your day. This simple warmup can soften your disks, stretch your back muscles, and help prevent injuries.  Shift positions often  At work and at home, change positions often. This helps keep your body from getting stiff. Stand up or lean back while you  sit. If you can, get up and move every 1/2 hour.  Form healthy habits  Here are some suggestions:   · Keep a healthy weight. When you weigh too much, your back is under excess strain. But losing just a few extra pounds can help a lot.  · Try not to overeat. Learn about serving sizes. The size of a serving depends on the food and the food group. Many foods list serving sizes on the labels.  · Handle minor aches with cold and heat. Apply cold the first 24 to 48 hours. Use heat after that. Always place a thin cloth between your skin and the source of cold or heat.  · Take medicines as directed. This helps keep pain under control. Always read labels, and call your healthcare provider or pharmacist if you have any questions.  Walk each day  A daily walk keeps your back and thigh muscles stretched and strong. This gives your back better support. Be sure to walk with your spines three curves aligned, by keeping your head, hips, and toes connected by a vertical line.   Date Last Reviewed: 10/18/2015  © 0310-8197 [employer]. [address] [place] [address]. All rights reserved. This information is not intended as a substitute for professional medical care. Always follow your healthcare professional's instructions.        Back Safety: Basics of Good Posture  Good posture helps protect you from injury. It also increases your comfort. Aim for good posture throughout the day.    Check your posture  The human body works best when it is properly aligned. To improve your standing posture, follow these steps:  · Take a moment to close your eyes and feel your body. Then breathe deeply and relax your shoulders, hips, and knees.  · Now, from the very top of your head, lift up just a bit. Think of a line linking your ears, shoulders, hips, and ankles. Adjust your body to follow the line. You may need to relax your hips and tuck your buttocks under a bit.  · Next, take a look at yourself in a mirror. Is one ear,  shoulder, or hip higher than the other? They should be level.  Check how you sit  When you sit properly, pressure on your back is reduced. Try these steps:  · Sit so that the curve of your lower back fits easily against the chair. Keep your gaze level.  · Support your feet. They should be flat on the floor or on a footrest. Your knees should be level with your hips.  · Adjust the chair height as needed. Sit so your forearms are level with the work surface.  Proper posture helps  When your back is aligned, its more likely to stay safe throughout the day.  · Standing in place. Rest one foot on a stool or low box to ease pressure on your lower back. Switch feet often. If you can, adjust the height of your work surface so your neck and shoulders arent under strain.  · Driving. Sit close enough to the steering wheel to keep your knees slightly bent. For comfort, your knees should be level with your hips or just a bit lower. Sit as straight as you can. The curve of your lower back should be fully supported.  · Walking. Stand tall and walk with your head up. Let your arms swing while you walk. This helps relax muscles. Wear shoes that fit and support your feet. If you will be standing or walking for a long time, dont wear high heels.  · Sitting and sleeping. Choose your furniture with care. Make sure its not causing or increasing your back pain. Chairs should allow for comfortable, correct sitting posture. Use pillows for added support if needed. Your bed should support your backs natural curves without being too hard or too soft.  Date Last Reviewed: 10/18/2015  © 7763-2183 [employer]. [address] [place] [address], [place] [address]. All rights reserved. This information is not intended as a substitute for professional medical care. Always follow your healthcare professional's instructions.        Relieving Tension in Your Back  Being relaxed helps keep your mind healthy and your back ready to move. Take  short breaks often. Walk around. Stretch. Switch tasks. Also give the following a try.  Make time to relax. Start by setting aside 5 minutes daily.   Deep breathing    Deep breathing is a simple way to reduce stress. You can do it almost any time you need to relax.  · Inhale slowly through your nose. Let your lungs and stomach expand.  · Hold your breath for 2 to 3 seconds.  · Exhale slowly through your mouth until your lungs feel empty. Repeat 3 to 4 times.  Relieve tension  Muscle tension can create tender spots called trigger points. The tips below may help relieve muscle tension.  · Press the trigger point if you can reach it. If not, lie on a soft tennis ball, or ask a friend to press the spot. Use steady pressure for 10 to 15 seconds. Breathe deeply. Repeat a few times.  · Massage trigger points with ice for 2 to 5 minutes. Press lightly at first. Slowly increase firmness.  Date Last Reviewed: 10/18/2015  © 3845-4081 [employer]. [address] [place] [address], [place] [address]. All rights reserved. This information is not intended as a substitute for professional medical care. Always follow your healthcare professional's instructions.        Exercises to Strengthen Your Lower Back  Strong lower back and abdominal muscles work together to support your spine. The exercises below will help strengthen the lower back. It is important that you begin exercising slowly and increase levels gradually.  Always begin any exercise program with stretching. If you feel pain while doing any of these exercises, stop and talk to your doctor about a more specific exercise program that better suits your condition.   Low back stretch  The point of stretching is to make you more flexible and increase your range of motion. Stretch only as much as you are able. Stretch slowly. Do not push your stretch to the limit. If at any point you feel pain while stretching, this is your (temporary) limit.  · Lie on your back with your  knees bent and both feet on the ground.  · Slowly raise your left knee to your chest as you flatten your lower back against the floor. Hold for 5 seconds.  · Relax and repeat the exercise with your right knee.  · Do 10 of these exercises for each leg.  · Repeat hugging both knees to your chest at the same time.  Building lower back strength  Start your exercise routine with 10 to 30 minutes a day, 1 to 3 times a day.  Initial exercises  Lying on your back:  1. Ankle pumps: Move your foot up and down, towards your head, and then away. Repeat 10 times with each foot.  2. Heel slides: Slowly bend your knee, drawing the heel of your foot towards you. Then slide your heel/foot from you, straightening your knee. Do not lift your foot off the floor (this is not a leg lift).  3. Abdominal contraction: Bend your knees and put your hands on your stomach. Tighten your stomach muscles. Hold for 5 seconds, then relax. Repeat 10 times.  4. Straight leg raise: Bend one leg at the knee and keep the other leg straight. Tighten your stomach muscles. Slowly lift your straight leg 6 to 12 inches off the floor and hold for up to 5 seconds. Repeat 10 times on each side.  Standin. Wall squats: Stand with your back against the wall. Move your feet about 12 inches away from the wall. Tighten your stomach muscles, and slowly bend your knees until they are at about a 45 degree angle. Do not go down too far. Hold about 5 seconds. Then slowly return to your starting position. Repeat 10 times.  2. Heel raises: Stand facing the wall. Slowly raise the heels of your feet up and down, while keeping your toes on the floor. If you have trouble balancing, you can touch the wall with your hands. Repeat 10 times.  More advanced exercises  When you feel comfortable enough, try these exercises.  1. Kneeling lumbar extension: Begin on your hands and knees. At the same time, raise and straighten your right arm and left leg until they are parallel to the  ground. Hold for 2 seconds and come back slowly to a starting position. Repeat with left arm and right leg, alternating 10 times.  2. Prone lumbar extension: Lie face down, arms extended overhead, palms on the floor. At the same time, raise your right arm and left leg as high as comfortably possible. Hold for 10 seconds and slowly return to start. Repeat with left arm and right leg, alternating 10 times. Gradually build up to 20 times. (Advanced: Repeat this exercise raising both arms and both legs a few inches off the floor at the same time. Hold for 5 seconds and release.)  3. Pelvic tilt: Lie on the floor on your back with your knees bent at 90 degrees. Your feet should be flat on the floor. Inhale, exhale, then slowly contract your abdominal muscles bringing your navel toward your spine. Let your pelvis rock back until your lower back is flat on the floor. Hold for 10 seconds while breathing smoothly.  4. Abdominal crunch: Perform a pelvic tilt (above) flattening your lower back against the floor. Holding the tension in your abdominal muscles, take another breath and raise your shoulder blades off the ground (this is not a full sit-up). Keep your head in line with your body (dont bend your neck forward). Hold for 2 seconds, then slowly lower.  Date Last Reviewed: 6/1/2016 © 2000-2016 [employer]. [address] [place] [address]. All rights reserved. This information is not intended as a substitute for professional medical care. Always follow your healthcare professional's instructions.        Back Exercises: Leg Pull        To start, lie on your back with your knees bent and feet flat on the floor. Dont press your neck or lower back to the floor. Breathe deeply. You should feel comfortable and relaxed in this position.  · Pull one knee to your chest.  · Hold for 30 to 60 seconds. Return to starting position.  · Repeat 2 times.  · Switch legs.  · For a double leg pull, pull both legs to  your chest at the same time. Repeat 2 times.  For your safety, check with your healthcare provider before starting an exercise program.   Date Last Reviewed: 8/16/2015 © 2000-2016 [email or web address]. [address] [place] [address], [place] [address]. All rights reserved. This information is not intended as a substitute for professional medical care. Always follow your healthcare professional's instructions.      Back Exercise to Keep Fit for Low Back Pain  To help in your recovery and to prevent further back problems, keep your back, abdominal muscles and legs strong. Walk daily as soon as you can. Gradually add other physical activities such as swimming and biking, which can help improve lower back strength. Begin as soon as you can do them comfortably. Do not do any exercises that make your pain a lot worse. The following are some back exercises that can help relieve low back pain.     Pelvic tilt   Repeat 5-10 times, twice per day.  Lie flat on your back (or stand with your back to a wall), knees bent, feet flat on the floor, body relaxed. Tighten your abdominal and buttock muscles, and tilt your pelvis. The curve of the small of your back should flatten towards the floor (or wall). Hold 10 seconds and then relax.       Knee raise     Repeat 5-10 times, twice per day.    Lie flat on your back, knees bent. Bring one knee slowly to your chest. Hug your knee gently. Then lower your leg toward the floor, keeping your knee bent. Do not straighten your legs. Repeat exercise with your other leg.              Partial press-up     Lie face down on a soft, firm surface. Do not turn your head to either side. Rest your arms bent at the elbows alongside your body. Relax for a few minutes. Then raise your upper body enough to lean on your elbows. Relax your lower back and legs as much as possible. Hold this position for 30 seconds at first. Gradually work up to five minutes. Or try slow press-ups. Hold each for five seconds,  and repeat five to six times.              Copyright © 2012 by [employer] [facility] [employer] [facility]   Meli M, Kit D, Jkae J, Josue B, Steve R, Drew B, Tru K, Kirby C, Kim B, Kian S, Eliane L, Cinthia R. [employer] [facility] [employer]. Adult Acute and Subacute Low Back Pain. Updated November 2012.     Back Exercises: Lower Back Rotation    To start, lie on your back with your knees bent and feet flat on the floor. Dont press your neck or lower back to the floor. Breathe deeply. You should feel comfortable and relaxed in this position.  · Drop both knees to one side. Turn your head to the other side. Keep your shoulders flat on the floor.  · Do not push through pain.  · Hold for 20 seconds.  · Slowly switch sides.  · Repeat 2 to 5 times.  Date Last Reviewed: 10/11/2015  © 7950-6030 [employer]. [address] [place] [address]. All rights reserved. This information is not intended as a substitute for professional medical care. Always follow your healthcare professional's instructions.        Back Exercises: Lower Back Stretch                        To start, sit in a chair with your feet flat on the floor. Shift your weight slightly forward. Relax, and keep your ears, shoulders, and hips aligned.  · Sit with your feet well apart.  · Bend forward and touch the floor with the backs of your hands. Relax and let your body drop.  · Hold for 20 seconds. Return to starting position.  · Repeat 2 times.   Date Last Reviewed: 8/16/2015  © 2751-2149 [employer]. [address] [place] [address]. All rights reserved. This information is not intended as a substitute for professional medical care. Always follow your healthcare professional's instructions.        Back Exercises: Seated Rotation  To start, sit in a chair with your feet flat on the floor. Shift your weight slightly forward to avoid rounding your back. Relax, and keep your  ears, shoulders, and hips aligned:  · Fold your arms and elbows just below shoulder height.  · Turn from the waist with hips forward. Turn your head last. Do not push through the pain.  · Hold for a count of 10 to 30. Return to starting position.  · Repeat 3 to 5 times on one side. Then switch sides.    Date Last Reviewed: 10/11/2015  © 4933-1928 [employer]. [address] [place] [address]. All rights reserved. This information is not intended as a substitute for professional medical care. Always follow your healthcare professional's instructions.        Lumbar Flexion (Flexibility)    1. Lie on your back on the floor, with your knees bent and your feet flat on the floor.  2. Gently pull your knees up toward your chest. Put your hands under your thighs to help pull your knees up.  3. Press your lower back down to the floor. Hold for 20 seconds.  4. Lower your legs back down to the floor and relax.  5. Repeat 2 times, or as instructed.  Date Last Reviewed: 3/10/2016  © 2549-1491 [employer]. [address] [place] [address]. All rights reserved. This information is not intended as a substitute for professional medical care. Always follow your healthcare professional's instructions.        Lumbar Extension (Flexibility)    6. Lie face down on your stomach, forehead on the floor. You can lie on a mat or towel.  7. Bend your arms next to your body and lift your upper body up on your forearms. Your palms and forearms should be flat on the floor. Keep your stomach and hips on the floor.  8. Hold your upper body up with your forearms for 20 seconds. Slowly lower back down to the floor.  9. Repeat 2 times, or as instructed.  Date Last Reviewed: 3/10/2016  © 6318-7221 [employer]. [address] [place] [address]. All rights reserved. This information is not intended as a substitute for professional medical care. Always follow your healthcare  professional's instructions.

## 2017-08-07 NOTE — LETTER
August 7, 2017      Becki Godoy, [facility]  [address]  [place] [address]           [facility] - Physiatry  [address] [place] [address]  Phone: [phone]  Fax: [phone]          Patient: Rajni Melchor   MR Number: [id number]   YOB: 1950   Date of Visit: 8/7/2017       Dear Becki Godoy:    Thank you for referring Rajni Melchor to me for evaluation. Attached you will find relevant portions of my assessment and plan of care.    If you have questions, please do not hesitate to call me. I look forward to following Rajni Melchor along with you.    Sincerely,    Chana Godinez MD    Enclosure  CC:  No Recipients    If you would like to receive this communication electronically, please contact [email or web address] or [phone] to request more information on [employer] Link access.    For providers and/or their staff who would like to refer a patient to [facility], please contact us through our one-stop-shop provider referral line, [facility], at [phone].    If you feel you have received this communication in error or would no longer like to receive these types of communications, please e-mail [email or web address]

## 2017-08-07 NOTE — PROGRESS NOTES
PM&R NEW PATIENT HISTORY & PHYSICAL :    Referring Physician:    Chief Complaint   Patient presents with    Back Pain     low back pain, to the left leg occasionally       HPI: This is a 67 y.o.  female being seen in clinic today for evaluation of low back achy pain and stiffness over the past few months.  Her symptoms are worse in the morning upon first awakening, with inactivity, or with excessive activity.  She feels a constant deep achy across her low back that resolves somewhat with rest or change of position.  At times she has felt tingling into her left toes-plantar surface. She denies leg weakness.     History obtained from patient    Functional History:  Walking: Not limited  Transfers: Independent  Assistive devices: No  Power mobility: No  Falls: None       Needs help with:  Nothing - all ADLS normal    Past medical, surgical, social, and family history reviewed    Review of Systems:     General- denies lethargy, weight change, fever, chills  Head/neck- denies swallowing difficulties  ENT- denies hearing changes  Cardiovascular-denies chest pain  Pulmonary- denies shortness of breath  GI- denies constipation or bowel incontinence  - denies bladder incontinence  Skin- denies wounds or rashes  Musculoskeletal- denies weakness, +pain  Neurologic- +/- numbness and tingling  Psychiatric- denies depressive or psychotic features, denies anxiety  Lymphatic-denies swelling  Endocrine- denies hypoglycemic symptoms/DM history  All other pertinent systems negative    Physical Examination:  General: Well developed, well nourished female, NAD,   in room  HEENT: NCAT EOMI  Pulmonary: normal respirations    Spinal Examination: CERVICAL  Active ROM is within normal limits.  Inspection: No deformity of spinal alignment.    Spinal Examination: LUMBAR or THORACIC  Active ROM is within normal limits but mild limitation at full flex and ext  Inspection: No deformity of spinal alignment.  No palpable  olisthesis  Palpation: No vertebral tenderness to percussion. Very ttp at si joints, gt bursas, mild at glut musculature    +facet loading on left  SLR Test (seated ):negative  bilaterally  Able to stand on heels and toes    Bilateral Upper and Lower Extremities:  Pulses are 2+ at radial, bilaterally.  Shoulder/Elbow/Wrist/Hand ROM   Hip/Knee/Ankle ROM wnl  Bilateral Extremities show normal capillary refill.  No signs of cyanosis, rubor, edema, skin changes, or dysvascular changes of appendages.  Nails appear intact.    Neurological Exam:  Cranial Nerves:  II-XII grossly intact    Manual Muscle Testing: (Motor 5=normal)  RIGHT Lower extremity: Hip flexion 4+/5, Hip Abduction 4/5, Knee extension 5/5, Knee flexion 5/5, Ankle dorsiflexion 5/5, Extensor hallucis longus 5/5, Ankle plantarflexion 5/5  LEFT Lower extremity:  Hip flexion 4/5, Hip Abduction 4/5, Knee extension 5/5, Knee flexion 5/5, Ankle dorsiflexion 5/5, Extensor hallucis longus 5/5, Ankle plantarflexion 5/5    No focal atrophy is noted of either upper or lower extremity.    Bilateral Reflexes:hypo at patellar  No clonus at knee or ankle.    Sensation: tested to light touch  - intact in legs  Gait: Narrow base and good arm swing.    Cerebellar:  tandem gait.     IMPRESSION/PLAN: This is a 67 y.o.  female with lumbar DJD/DDD, mild scoliosis, sacroiliitis, possible mild left leg radiculitis     1. Rx for PT[facility]--Core and hip strengthening, stretch, myofascial release, ROM, modalities, HEP, dec pain  2. Reviewed xray with patient and  and Discussed in length concerning diagnoses and treatment plan. All questions answered  3. Refilled motrin 800mg BID x2 weeks with prn thereafter. Ice/heat modalities prn  4. Handouts on back care, exercise, stretching provided  5. Fu prn, if not improving and if radicular symptoms worsening, will consider traction, MRI and referral for JESUSITA rhoda Godinez M.D.  Physical Medicine and Rehab

## 2017-08-15 ENCOUNTER — OFFICE VISIT (OUTPATIENT)
Dept: RHEUMATOLOGY | Facility: CLINIC | Age: 67
End: 2017-08-15
Payer: MEDICARE

## 2017-08-15 ENCOUNTER — HOSPITAL ENCOUNTER (OUTPATIENT)
Dept: RADIOLOGY | Facility: HOSPITAL | Age: 67
Discharge: HOME | End: 2017-08-15
Attending: FAMILY MEDICINE
Payer: MEDICARE

## 2017-08-15 VITALS
HEART RATE: 55 BPM | SYSTOLIC BLOOD PRESSURE: 134 MMHG | BODY MASS INDEX: 34.75 KG/M2 | DIASTOLIC BLOOD PRESSURE: 75 MMHG | WEIGHT: 172.38 LBS | HEIGHT: 59 IN

## 2017-08-15 DIAGNOSIS — Z51.81 MEDICATION MONITORING ENCOUNTER: ICD-10-CM

## 2017-08-15 DIAGNOSIS — E55.9 VITAMIN D DEFICIENCY DISEASE: ICD-10-CM

## 2017-08-15 DIAGNOSIS — M81.0 AGE-RELATED OSTEOPOROSIS WITHOUT CURRENT PATHOLOGICAL FRACTURE: Primary | ICD-10-CM

## 2017-08-15 DIAGNOSIS — Z12.31 ENCOUNTER FOR SCREENING MAMMOGRAM FOR BREAST CANCER: ICD-10-CM

## 2017-08-15 PROCEDURE — 77067 SCR MAMMO BI INCL CAD: CPT | Mod: 26,,, | Performed by: RADIOLOGY

## 2017-08-15 PROCEDURE — 3008F BODY MASS INDEX DOCD: CPT | Mod: ,,, | Performed by: PHYSICIAN ASSISTANT

## 2017-08-15 PROCEDURE — 99213 OFFICE O/P EST LOW 20 MIN: CPT | Mod: PBBFAC,25,PO | Performed by: PHYSICIAN ASSISTANT

## 2017-08-15 PROCEDURE — 99999 PR PBB SHADOW E&M-EST. PATIENT-LVL III: CPT | Mod: PBBFAC,,, | Performed by: PHYSICIAN ASSISTANT

## 2017-08-15 PROCEDURE — 96372 THER/PROPH/DIAG INJ SC/IM: CPT | Mod: PBBFAC,PO

## 2017-08-15 PROCEDURE — 77067 SCR MAMMO BI INCL CAD: CPT | Mod: TC

## 2017-08-15 PROCEDURE — 1126F AMNT PAIN NOTED NONE PRSNT: CPT | Mod: ,,, | Performed by: PHYSICIAN ASSISTANT

## 2017-08-15 PROCEDURE — 1159F MED LIST DOCD IN RCRD: CPT | Mod: ,,, | Performed by: PHYSICIAN ASSISTANT

## 2017-08-15 PROCEDURE — 99214 OFFICE O/P EST MOD 30 MIN: CPT | Mod: S$PBB,,, | Performed by: PHYSICIAN ASSISTANT

## 2017-08-15 RX ADMIN — DENOSUMAB 60 MG: 60 INJECTION SUBCUTANEOUS at 10:08

## 2017-08-15 NOTE — PROGRESS NOTES
"Subjective:       Patient ID: Rajni Melchor is a 67 y.o. female.    Chief Complaint: Osteoporosis and Vitamin D Deficiency    Rajni is back for rheumatology follow-up she has osteoporosis by DEXA this was last done 7/23/14 with femur neck T scores of -2.7.  Prior wrist fracture.  Had failed Evista and oral medications moved to Prolia 60 mg subcutaneous injection every 6 months.  She has received 3 injections thus far her last 4/21/16.   She is daily ca, zinc, and mag daily supplements and vitamin D 2000 every day for only 1 week. She had been taking it only once a week.   Her last vitamin D level was 24.  She denies any falls or fractures since last visit.  Pain level 0/10.          Review of Systems   Constitutional: Negative.  Negative for activity change, appetite change, chills, fatigue and fever.   HENT: Negative.  Negative for mouth sores and trouble swallowing.         No dry mouth   Eyes: Negative.  Negative for photophobia, pain and redness.        No swollen or red eyes, no dry eye     Respiratory: Negative.  Negative for chest tightness, shortness of breath, wheezing and stridor.    Cardiovascular: Negative.  Negative for chest pain.   Gastrointestinal: Negative.  Negative for abdominal pain, blood in stool, diarrhea, nausea and vomiting.   Genitourinary: Negative.  Negative for dysuria, frequency, hematuria and urgency.   Musculoskeletal: Negative.  Negative for arthralgias, back pain, gait problem, joint swelling, myalgias, neck pain and neck stiffness.   Skin: Negative.  Negative for color change, pallor and rash.   Neurological: Negative.  Negative for weakness.   Hematological: Negative for adenopathy.   Psychiatric/Behavioral: Negative for suicidal ideas.         Objective:     /75   Pulse (!) 55   Ht 4' 11" (1.499 m)   Wt 78.2 kg (172 lb 6.4 oz)   BMI 34.82 kg/m²      Physical Exam   Constitutional: She is oriented to person, place, and time and well-developed, well-nourished, " and in no distress. No distress.   HENT:   Head: Normocephalic and atraumatic.   Right Ear: External ear normal.   Left Ear: External ear normal.   Mouth/Throat: No oropharyngeal exudate.   Eyes: Conjunctivae and EOM are normal. Pupils are equal, round, and reactive to light. No scleral icterus.   Neck: Normal range of motion. Neck supple. No thyromegaly present.   Cardiovascular: Normal rate, regular rhythm and normal heart sounds.    No murmur heard.  Pulmonary/Chest: Effort normal and breath sounds normal. She exhibits no tenderness.   Abdominal: Soft. Bowel sounds are normal.   Lymphadenopathy:     She has no cervical adenopathy.   Neurological: She is alert and oriented to person, place, and time. She displays normal reflexes. No cranial nerve deficit. She exhibits normal muscle tone. Gait normal.   Skin: Skin is warm and dry. No rash noted.     Musculoskeletal: Normal range of motion. She exhibits no edema, tenderness or deformity.   She has no kyphosis noted in the back  No tenderness to palpation along the spine                 Recent Results (from the past 168 hour(s))   Comprehensive metabolic panel    Collection Time: 08/15/17  8:06 AM   Result Value Ref Range    Sodium 141 136 - 145 mmol/L    Potassium 4.6 3.5 - 5.1 mmol/L    Chloride 108 95 - 110 mmol/L    CO2 24 23 - 29 mmol/L    Glucose 84 70 - 110 mg/dL    BUN, Bld 10 8 - 23 mg/dL    Creatinine 0.9 0.5 - 1.4 mg/dL    Calcium 9.2 8.7 - 10.5 mg/dL    Total Protein 6.4 6.0 - 8.4 g/dL    Albumin 3.5 3.5 - 5.2 g/dL    Total Bilirubin 0.7 0.1 - 1.0 mg/dL    Alkaline Phosphatase 60 55 - 135 U/L    AST 16 10 - 40 U/L    ALT 18 10 - 44 U/L    Anion Gap 9 8 - 16 mmol/L    eGFR if African American >60 >60 mL/min/1.73 m^2    eGFR if non African American >60 >60 mL/min/1.73 m^2        DEXA 8/15/17 TF -1.5, FN -2.5, spine -1.6- stable and improved     DEXA 7/27/14 total femur T score -1.6, femur -2.7, spine -2.1 impression osteoporosis     Assessment:       1.  Age-related osteoporosis without current pathological fracture    2. Medication monitoring encounter    3. Vitamin D deficiency disease          1.  Osteoporosis by DEXA now on Prolia X 2 years (since 4/2016) after failing oral medicines including Evista and bisphosphonates prior fractures- okay for #4 Prolia today  dexa 8/15/17 improved but still osteoporosis- will continue prolia    2.  Chronic low vitamin D on 2000 IU daily for 1week, was only taking 2000 Q week- last d still low at 24    3.  Medication monitoring with no current issues of toxicity    Plan:       Okay for Prolia 60 mg Q's injection today  Continue once daily ca, mag and zinc this is ok  Increase vit D to 4000 IU daily X 4 weeks then can cut back to 2000 IU daily there after    dexa stable and improved, continue prolia X 2 year and repeat dexa in 2 year    We'll see her back in 6 months including CMP, vitamin D with Prolia    Call with any questions, changes, or concerns

## 2017-08-15 NOTE — PROGRESS NOTES
Administered 1cc Prolia 60mg/cc to LLQ of abdomen. Pt. Tolerated well. No acute reaction noted to site. Pt. Instructed on S/S of reaction to report. Pt. Verbalized understanding.    Lot:[id number]  Exp:02/19  Manu:[email or web address]

## 2017-08-15 NOTE — LETTER
August 15, 2017      Brad Hoyos MD  [address] [place] [address]           [facility] - Rheumatology  [address]  [place] [address]  Phone: [phone]  Fax: [phone]          Patient: Rajni Melchor   MR Number: [id number]   YOB: 1950   Date of Visit: 8/15/2017       Dear Dr. Brad Hoyos:    Thank you for referring Rajni Melchor to me for evaluation. Attached you will find relevant portions of my assessment and plan of care.    If you have questions, please do not hesitate to call me. I look forward to following Rajni Melchor along with you.    Sincerely,    DANA Ortegaosure  CC:  No Recipients    If you would like to receive this communication electronically, please contact [email or web address] or [phone] to request more information on [employer] Link access.    For providers and/or their staff who would like to refer a patient to [facility], please contact us through our one-stop-shop provider referral line, [facility], at [phone].    If you feel you have received this communication in error or would no longer like to receive these types of communications, please e-mail [email or web address]

## 2017-10-18 ENCOUNTER — PATIENT MESSAGE (OUTPATIENT)
Dept: INTERNAL MEDICINE | Facility: CLINIC | Age: 67
End: 2017-10-18

## 2017-10-18 DIAGNOSIS — E03.9 HYPOTHYROIDISM, UNSPECIFIED TYPE: Primary | ICD-10-CM

## 2017-10-19 NOTE — TELEPHONE ENCOUNTER
Placed lab orders for thyroid. Pt can come in to have drawn to see where she stands and if needing to adjust dose before her trip. Kylah Benedict MD

## 2017-11-07 ENCOUNTER — LAB VISIT (OUTPATIENT)
Dept: LAB | Facility: HOSPITAL | Age: 67
End: 2017-11-07
Attending: FAMILY MEDICINE
Payer: MEDICARE

## 2017-11-07 DIAGNOSIS — E03.9 HYPOTHYROIDISM, UNSPECIFIED TYPE: ICD-10-CM

## 2017-11-07 LAB
T4 FREE SERPL-MCNC: 1.31 NG/DL
TSH SERPL DL<=0.005 MIU/L-ACNC: 1.88 UIU/ML

## 2017-11-07 PROCEDURE — 36415 COLL VENOUS BLD VENIPUNCTURE: CPT | Mod: PO

## 2017-11-07 PROCEDURE — 84439 ASSAY OF FREE THYROXINE: CPT

## 2017-11-07 PROCEDURE — 84443 ASSAY THYROID STIM HORMONE: CPT

## 2017-11-21 ENCOUNTER — OFFICE VISIT (OUTPATIENT)
Dept: URGENT CARE | Facility: CLINIC | Age: 67
End: 2017-11-21
Payer: MEDICARE

## 2017-11-21 VITALS
BODY MASS INDEX: 32.93 KG/M2 | DIASTOLIC BLOOD PRESSURE: 82 MMHG | HEART RATE: 97 BPM | SYSTOLIC BLOOD PRESSURE: 128 MMHG | TEMPERATURE: 98 F | OXYGEN SATURATION: 97 % | HEIGHT: 60 IN | WEIGHT: 167.75 LBS

## 2017-11-21 DIAGNOSIS — J32.0 MAXILLARY SINUSITIS, UNSPECIFIED CHRONICITY: Primary | ICD-10-CM

## 2017-11-21 PROCEDURE — 96372 THER/PROPH/DIAG INJ SC/IM: CPT | Mod: PBBFAC,PO

## 2017-11-21 PROCEDURE — 99213 OFFICE O/P EST LOW 20 MIN: CPT | Mod: PBBFAC,PO | Performed by: NURSE PRACTITIONER

## 2017-11-21 PROCEDURE — 99999 PR PBB SHADOW E&M-EST. PATIENT-LVL III: CPT | Mod: PBBFAC,,, | Performed by: NURSE PRACTITIONER

## 2017-11-21 PROCEDURE — 99214 OFFICE O/P EST MOD 30 MIN: CPT | Mod: S$PBB,,, | Performed by: NURSE PRACTITIONER

## 2017-11-21 RX ORDER — BETAMETHASONE SODIUM PHOSPHATE AND BETAMETHASONE ACETATE 3; 3 MG/ML; MG/ML
12 INJECTION, SUSPENSION INTRA-ARTICULAR; INTRALESIONAL; INTRAMUSCULAR; SOFT TISSUE ONCE
Status: COMPLETED | OUTPATIENT
Start: 2017-11-21 | End: 2017-11-21

## 2017-11-21 RX ORDER — AZITHROMYCIN 250 MG/1
TABLET, FILM COATED ORAL
Qty: 6 TABLET | Refills: 0 | Status: SHIPPED | OUTPATIENT
Start: 2017-11-21 | End: 2017-11-26

## 2017-11-21 RX ADMIN — BETAMETHASONE ACETATE AND BETAMETHASONE SODIUM PHOSPHATE 12 MG: 3; 3 INJECTION, SUSPENSION INTRA-ARTICULAR; INTRALESIONAL; INTRAMUSCULAR; SOFT TISSUE at 10:11

## 2017-11-21 NOTE — PATIENT INSTRUCTIONS
Understanding Sinus Problems    You dont often think about your sinuses until theres a problem. One day you realize you cant smell dinner cooking. Or you find you often have headaches or problems breathing through your nose.  Symptoms of sinus problems  Sinus problems can cause uncomfortable symptoms. Your nose may run constantly. You might have trouble sleeping at night. You may even lose your sense of smell. Other symptoms can include:  · Nasal congestion  · Fullness in ears  · Green, yellow, or bloody drainage from the nose  · Trouble tasting food  · Frequent headaches  · Facial pain  · Cough  When sinuses are blocked  If something blocks the passages in the nose or sinuses, mucus cant drain. Mucus-filled sinuses often become infected.  · Colds cause the lining of the nose and sinuses to swell and make extra mucus. A buildup of mucus can lead to a more serious infection.  · Allergies irritate turbinates and other tissues. This causes swelling, which can cause a blockage. Over time, this irritation can also lead to sacs of swollen tissue (polyps).  · Polyps may form in both the sinuses and nose. Polyps can grow large enough to clog nasal passages and block drainage.  · A crooked (deviated) septum may block nasal passages. This is often the result of an injury.  Date Last Reviewed: 11/1/2016  © 7634-0398 [employer]. [address] [place] [address], [place] [address]. All rights reserved. This information is not intended as a substitute for professional medical care. Always follow your healthcare professional's instructions.

## 2017-11-21 NOTE — PROGRESS NOTES
Administered Celestone 12mg  IM to pt left ventrogluteal. Pt tolerated well. No distress noted. Advised pt to wait 20 minutes in lobby and to inform  if any adverse reaction occurs. Pt stated understanding.

## 2017-11-21 NOTE — PROGRESS NOTES
Subjective:       Patient ID: Rajni Melchor is a 67 y.o. female.    Chief Complaint: URI    Sinus Problem   This is a new problem. The current episode started in the past 7 days. The problem is unchanged. There has been no fever. The pain is mild. Associated symptoms include congestion, coughing, headaches, sinus pressure, sneezing and a sore throat. Pertinent negatives include no chills, diaphoresis, hoarse voice, neck pain, shortness of breath or swollen glands. Past treatments include nothing.     Review of Systems   Constitutional: Negative for chills, diaphoresis, fatigue and fever.   HENT: Positive for congestion, rhinorrhea, sinus pain, sinus pressure, sneezing and sore throat. Negative for hoarse voice.    Respiratory: Positive for cough. Negative for shortness of breath, wheezing and stridor.    Cardiovascular: Negative for chest pain, palpitations and leg swelling.   Musculoskeletal: Negative for neck pain.   Neurological: Positive for headaches. Negative for dizziness.   Psychiatric/Behavioral: Negative for agitation.       Objective:      Physical Exam   Constitutional: She is oriented to person, place, and time. She appears well-developed and well-nourished.   HENT:   Head: Normocephalic.   Right Ear: Tympanic membrane normal.   Left Ear: Tympanic membrane normal.   Nose: Mucosal edema, rhinorrhea and sinus tenderness present. Right sinus exhibits maxillary sinus tenderness. Right sinus exhibits no frontal sinus tenderness. Left sinus exhibits maxillary sinus tenderness. Left sinus exhibits no frontal sinus tenderness.   Mouth/Throat: Uvula is midline and mucous membranes are normal. Posterior oropharyngeal erythema present. No tonsillar exudate.   Eyes: Pupils are equal, round, and reactive to light.   Cardiovascular: Normal rate and regular rhythm.    Pulmonary/Chest: Effort normal and breath sounds normal.   Abdominal: Soft. Bowel sounds are normal.   Musculoskeletal: Normal range of motion.    Neurological: She is alert and oriented to person, place, and time.   Skin: Skin is warm and dry.   Psychiatric: She has a normal mood and affect.   Nursing note and vitals reviewed.      Assessment:       1. Maxillary sinusitis, unspecified chronicity        Plan:         Rajni was seen today for uri.    Diagnoses and all orders for this visit:    Maxillary sinusitis, unspecified chronicity  -     betamethasone acetate-betamethasone sodium phosphate injection 12 mg; Inject 2 mLs (12 mg total) into the muscle once.  -     azithromycin (Z-ELLEN) 250 MG tablet; Take 2 tablets by mouth on day 1; Take 1 tablet by mouth on days 2-5    Patient informed of potential side effects of steroid injection including elevating blood pressure, increased blood glucose levels, increased risk of opportunistic infections, peptic ulcer disease and GI bleeding, insomnia, tremors, suppression of ones own steroid production, avascular necrosis, osteoporosis, increased intraocular pressure, etc. Patient verbalizes risk/benefit profile and agrees to steroid injection.   Antibiotic Therapy  Take antibiotics for entire course.  Do not save medications for later, all medications must be taken for full regimen.  Follow up with PCP or ER if symptoms do not improve or worsen.

## 2017-12-06 ENCOUNTER — OFFICE VISIT (OUTPATIENT)
Dept: INTERNAL MEDICINE | Facility: CLINIC | Age: 67
End: 2017-12-06
Payer: MEDICARE

## 2017-12-06 VITALS
BODY MASS INDEX: 33.2 KG/M2 | TEMPERATURE: 97 F | DIASTOLIC BLOOD PRESSURE: 70 MMHG | SYSTOLIC BLOOD PRESSURE: 100 MMHG | HEIGHT: 59 IN | HEART RATE: 74 BPM | WEIGHT: 164.69 LBS

## 2017-12-06 DIAGNOSIS — M54.16 LUMBAR RADICULITIS: ICD-10-CM

## 2017-12-06 DIAGNOSIS — Z23 NEED FOR PNEUMOCOCCAL VACCINATION: ICD-10-CM

## 2017-12-06 DIAGNOSIS — M81.0 AGE-RELATED OSTEOPOROSIS WITHOUT CURRENT PATHOLOGICAL FRACTURE: ICD-10-CM

## 2017-12-06 DIAGNOSIS — F40.240 CLAUSTROPHOBIA: ICD-10-CM

## 2017-12-06 DIAGNOSIS — M46.1 SACROILIITIS: Primary | ICD-10-CM

## 2017-12-06 PROCEDURE — 99214 OFFICE O/P EST MOD 30 MIN: CPT | Mod: S$PBB,,, | Performed by: FAMILY MEDICINE

## 2017-12-06 PROCEDURE — 99213 OFFICE O/P EST LOW 20 MIN: CPT | Mod: PBBFAC,PO,25 | Performed by: FAMILY MEDICINE

## 2017-12-06 PROCEDURE — 99999 PR PBB SHADOW E&M-EST. PATIENT-LVL III: CPT | Mod: PBBFAC,,, | Performed by: FAMILY MEDICINE

## 2017-12-06 PROCEDURE — 90732 PPSV23 VACC 2 YRS+ SUBQ/IM: CPT | Mod: PBBFAC,PO

## 2017-12-06 PROCEDURE — G0009 ADMIN PNEUMOCOCCAL VACCINE: HCPCS | Mod: PBBFAC,PO

## 2017-12-06 RX ORDER — LORAZEPAM 0.5 MG/1
.5-1 TABLET ORAL ONCE
Qty: 2 TABLET | Refills: 0 | Status: SHIPPED | OUTPATIENT
Start: 2017-12-06 | End: 2018-06-06

## 2017-12-06 NOTE — PROGRESS NOTES
Subjective:      Patient ID: Rajni Melchor is a 67 y.o. female.    Chief Complaint: Hip Pain (left radiating)    Patient's coming in today for an acute visit because of left leg pain.  She was seen back in March due to some left hip and sacroiliitis.  She saw Dr. Madrigal who recommended she use ibuprofen and do physical therapy.  She did complete physical therapy and her symptoms got better however just recently over the last 2-3 days it's flared up again.  She has the worst pain at night when she's laying down.  It does shoot down her leg.  We have and x-ray show that she has in general changes and inflammation of the SI joints.  She's concerned however about what the next Should be because she wants to get this treated where it will not recur.  She's not been doing her strengthening exercises at this time.  She's not been taking her ibuprofen either.  She does not want to get started on gabapentin because she reports her  was on that medication and he had some cognitive decline was on it.  She's willing to proceed forward with an MRI however she does get claustrophobic in small spaces that she will need a medication.      She did meet with rheumatology and is currently still on Prolia injections.  She does have osteoporosis.  She's up-to-date on her vitamin D levels as well.      Patient is also needing her pneumonia 23 vaccine today.  Willing to do so.      Leg Pain    The incident occurred 3 to 5 days ago. There was no injury mechanism. The pain is present in the left thigh, left hip and left leg. The quality of the pain is described as aching and burning. The pain is at a severity of 7/10. The pain is moderate. The pain has been intermittent since onset. Associated symptoms include tingling. Pertinent negatives include no inability to bear weight, loss of motion or numbness. She reports no foreign bodies present. The symptoms are aggravated by movement and weight bearing. She has tried rest and  NSAIDs for the symptoms. The treatment provided no relief.       Lab Results   Component Value Date    WBC 5.76 02/20/2017    HGB 14.0 02/20/2017    HCT 42.8 02/20/2017     02/20/2017    CHOL 199 02/20/2017    TRIG 52 02/20/2017    HDL 83 (H) 02/20/2017    ALT 18 08/15/2017    AST 16 08/15/2017     08/15/2017    K 4.6 08/15/2017     08/15/2017    CREATININE 0.9 08/15/2017    BUN 10 08/15/2017    CO2 24 08/15/2017    TSH 1.878 11/07/2017    HGBA1C 5.5 01/23/2013       Review of Systems   Constitutional: Positive for activity change. Negative for appetite change.   Musculoskeletal: Positive for arthralgias, back pain and myalgias.   Neurological: Positive for tingling. Negative for weakness and numbness.   Psychiatric/Behavioral: Positive for sleep disturbance.     Objective:     Physical Exam   Constitutional: She appears well-developed and well-nourished.   Musculoskeletal:        Left hip: She exhibits normal range of motion, normal strength, no tenderness and no bony tenderness.        Left knee: She exhibits normal range of motion, no swelling and no effusion.        Lumbar back: She exhibits tenderness, bony tenderness, pain and spasm. She exhibits normal range of motion.        Left upper leg: She exhibits tenderness. She exhibits no bony tenderness, no swelling and no edema.   Psychiatric: She has a normal mood and affect. Her speech is normal and behavior is normal.   Vitals reviewed.    Assessment:     1. Sacroiliitis    2. Lumbar radiculitis    3. Age-related osteoporosis without current pathological fracture    4. Claustrophobia    5. Need for pneumococcal vaccination      Plan:   Rajni was seen today for hip pain.    Diagnoses and all orders for this visit:    Sacroiliitis-flaring up again.  Per recommendation by physical medicine was to schedule MRI next if she had recurrence of pain.  She's completed physical therapy.  She's had x-rays.  Restart ibuprofen.  We'll refer her to pain  management for possible JESUSITA injection once MRI results of been reviewed.  -     MRI Lumbar Spine Without Contrast; Future  -     Ambulatory consult to Pain Clinic    Lumbar radiculitis- flaring up again.  Per recommendation by physical medicine was to schedule MRI next if she had recurrence of pain.  She's completed physical therapy.  She's had x-rays.  Restart ibuprofen.  We'll refer her to pain management for possible JESUSITA injection once MRI results of been reviewed.  With the left leg.    -     MRI Lumbar Spine Without Contrast; Future the year  -     Ambulatory consult to Pain Clinic    Age-related osteoporosis without current pathological fracture-on Prolia continue    Claustrophobia-patient reports she will need medication to use prior to the MRI.  We'll send an Ativan for him to use up to 1 mg prior to the procedure    Need for pneumococcal vaccination-update today    Other orders  -     LORazepam (ATIVAN) 0.5 MG tablet; Take 1-2 tablets (0.5-1 mg total) by mouth once.  -     Pneumococcal Polysaccharide Vaccine (23 Valent) (SQ/IM)            Return if symptoms worsen or fail to improve.

## 2018-01-04 ENCOUNTER — TELEPHONE (OUTPATIENT)
Dept: RADIOLOGY | Facility: HOSPITAL | Age: 68
End: 2018-01-04

## 2018-01-05 ENCOUNTER — HOSPITAL ENCOUNTER (OUTPATIENT)
Dept: RADIOLOGY | Facility: HOSPITAL | Age: 68
Discharge: HOME | End: 2018-01-05
Attending: FAMILY MEDICINE
Payer: MEDICARE

## 2018-01-05 DIAGNOSIS — M46.1 SACROILIITIS: ICD-10-CM

## 2018-01-05 DIAGNOSIS — M54.16 LUMBAR RADICULITIS: ICD-10-CM

## 2018-01-05 PROCEDURE — 72148 MRI LUMBAR SPINE W/O DYE: CPT | Mod: TC,PO

## 2018-01-05 PROCEDURE — 72148 MRI LUMBAR SPINE W/O DYE: CPT | Mod: 26,,, | Performed by: RADIOLOGY

## 2018-01-09 ENCOUNTER — OFFICE VISIT (OUTPATIENT)
Dept: PAIN MEDICINE | Facility: CLINIC | Age: 68
End: 2018-01-09
Payer: MEDICARE

## 2018-01-09 VITALS
HEART RATE: 71 BPM | RESPIRATION RATE: 16 BRPM | HEIGHT: 59 IN | DIASTOLIC BLOOD PRESSURE: 71 MMHG | SYSTOLIC BLOOD PRESSURE: 115 MMHG | WEIGHT: 164 LBS | BODY MASS INDEX: 33.06 KG/M2

## 2018-01-09 DIAGNOSIS — M54.16 LUMBAR RADICULOPATHY: Primary | ICD-10-CM

## 2018-01-09 DIAGNOSIS — M48.061 LUMBAR FORAMINAL STENOSIS: ICD-10-CM

## 2018-01-09 DIAGNOSIS — M51.36 DDD (DEGENERATIVE DISC DISEASE), LUMBAR: ICD-10-CM

## 2018-01-09 DIAGNOSIS — M47.817 LUMBOSACRAL SPONDYLOSIS WITHOUT MYELOPATHY: ICD-10-CM

## 2018-01-09 PROCEDURE — 99214 OFFICE O/P EST MOD 30 MIN: CPT | Mod: S$PBB,,, | Performed by: PHYSICIAN ASSISTANT

## 2018-01-09 PROCEDURE — 99999 PR PBB SHADOW E&M-EST. PATIENT-LVL III: CPT | Mod: PBBFAC,,, | Performed by: PHYSICIAN ASSISTANT

## 2018-01-09 PROCEDURE — 99213 OFFICE O/P EST LOW 20 MIN: CPT | Mod: PBBFAC | Performed by: PHYSICIAN ASSISTANT

## 2018-01-09 NOTE — LETTER
January 9, 2018      Kylah Benedict MD  [address]  [address]  [place] [address]           [facility] - Interventional [facility]  [address]  [place] [address]  Phone: [phone]  Fax: [phone]          Patient: Rajni Melchor   MR Number: [id number]   YOB: 1950   Date of Visit: 1/9/2018       Dear Dr. Kylah Benedict:    Thank you for referring Rajni Melchor to me for evaluation. Attached you will find relevant portions of my assessment and plan of care.    If you have questions, please do not hesitate to call me. I look forward to following Rajni Melchor along with you.    Sincerely,    DANA Rodriguezosure  CC:  No Recipients    If you would like to receive this communication electronically, please contact [email or web address] or [phone] to request more information on [employer] Link access.    For providers and/or their staff who would like to refer a patient to [facility], please contact us through our one-stop-shop provider referral line, [facility], at [phone].    If you feel you have received this communication in error or would no longer like to receive these types of communications, please e-mail [email or web address]

## 2018-01-09 NOTE — PROGRESS NOTES
Chief Pain Complaint:  Lower back pain    History of Present Illness:   This patient is a 67 y.o. female who presents today complaining of the above noted pain/s. The patient describes the pain as follows.    - duration of pain: 1 year   - timing: intermittent   - character: shooting, sharp  - radiating, dermatomal: extends into left leg along ~L5 (radiates down posterolateral leg then wraps around to shin)  - antecedent trauma, prior spinal surgery: no prior trauma, no prior spinal surgery   - aggravating factors: sitting, lying down for extended periods  - alleviating factors: walking  - pertinent negatives: No fever, No chills, No weight loss, No bladder dysfunction, No bowel dysfunction, No extremity weakness, No saddle anesthesia  - pertinent positives: none    - medications, other therapies tried (physical therapy, injections):     >> Ibuprofen    >> Has previously undergone Physical Therapy with relief while she was in it, but pain returned once she stopped    >> Has NOT previously undergone spinal injection/s      Imaging / Labs / Studies (reviewed on 1/9/2018):    Results for orders placed during the hospital encounter of 01/05/18   MRI Lumbar Spine Without Contrast    Narrative TECHNIQUE: MRI lumbar spine was performed without contrast on a 1.5T magnet. The following sequences were obtained: Localizer; sagittal T1, T2, STIR; axial T1 and T2.  COMPARISON: Not available.  FINDINGS:  There are 5 lumbar vertebrae.  Vertebral body heights and alignment are maintained.  There is moderate to severe disc height loss at L5-S1 with associated degenerative endplate changes.  Disc desiccation and mild disc height loss noted from L2-L3 through L4-L5.  Multilevel Schmorl's nodes are present in the lower thoracic and upper lumbar spine.  Small hemangioma noted within the L1 vertebral body.  No evidence of fracture or marrow replacement process.   Conus terminates at L1 and appears unremarkable. Limited evaluation of  posterior abdominal structures is unremarkable.  Paraspinal musculature is within normal limits.    L1-L2: No spinal canal stenosis or neuroforaminal narrowing.  L2-L3: Small left paracentral disc protrusion superimposed upon more generalized disc bulge. Mild bilateral neural foraminal narrowing.  No spinal canal stenosis.  L3-L4: Mild generalized disc bulge. Mild bilateral neuroforaminal narrowing.  No spinal canal stenosis.  L4-L5: Generalized disc bulge and mild bilateral facet arthropathy. Mild bilateral neural foraminal narrowing.  No spinal canal stenosis.  L5-S1: Mild generalized disc bulge and mild bilateral facet arthropathy. Moderate bilateral neuroforaminal narrowing.  No spinal canal stenosis.       Results for orders placed during the hospital encounter of 07/26/17   X-Ray Lumbar Spine AP And Lateral    Narrative Comparison: Chest x-ray dated 03/12/2014  Technique: AP and lateral views were obtained of the lumbar spine  Findings: Slight anterior wedge deformity of the T12 vertebral body appears unchanged similar to chest x-ray.  There is slight dextroscoliotic curvature of the visualized thoracic spine.  Lumbar vertebral body heights and alignment are otherwise within normal limits. There is mild diffuse disc height loss throughout the lumbar spine with moderate disc height loss at L5-S1.  Bilateral facet arthropathy suspected at L5-S1. Posterior elements appear grossly intact. No acute fractures or subluxations are demonstrated.  The remaining visualized osseous and soft tissue structures demonstrate no appreciable abnormality.         Review of Systems:  CONSTITUTIONAL: patient denies any fever, chills, or weight loss  SKIN: patient denies any rash or itching  RESPIRATORY: patient denies having any shortness of breath  GASTROINTESTINAL: patient denies having any diarrhea, constipation, or bowel incontinence  GENITOURINARY: patient denies having any abnormal bladder function    MUSCULOSKELETAL:  -  "patient complains of the above noted pain/s (see chief pain complaint)    NEUROLOGICAL:   - pain as above  - strength in Lower extremities is intact, BILATERALLY  - sensation in Lower extremities is abnormal, on the LEFT  - patient denies any loss of bowel or bladder control      PSYCHIATRIC: patient denies any change in mood    Other:  All other systems reviewed and are negative      Physical Exam:  Vitals:  /71 (BP Location: Right arm, Patient Position: Sitting, BP Method: Medium (Automatic))   Pulse 71   Resp 16   Ht 4' 11" (1.499 m)   Wt 74.4 kg (164 lb)   BMI 33.12 kg/m²    (reviewed on 1/9/2018)    General: alert and oriented, in no apparent distress  Gait: normal gait  Skin: No rashes, No discoloration, No obvious lesions  HEENT: EOMI  Cardiovascular: no significant peripheral edema present  Respiratory: respirations nonlabored    Musculoskeletal:  - Any pain on flexion, extension, rotation of lumbar spine:    >> pain on flexion    >> no pain on extension     >> facet loading causes pain contralaterally  - Straight Leg Raise:     >> LEFT :: negative    >> RIGHT :: negative  - Any tenderness to palpation across paraspinal muscles, joints, bursae:     >> across lumbar paraspinals    Neuro:  - Extremity Strength:     >> LEFT :: 5/5    >> RIGHT :: 5/5  - Extremity Reflexes:    >> LEFT  :: 2+    >> RIGHT :: 2+  - Sensory (sensation to light touch):    >> LEFT :: intact    >> RIGHT :: intact     Psych:  Mood and affect is appropriate      Assessment:  Lumbar Degenerative Disc Disease  Lumbar Radiculopathy      Plan:  Patient complains of low back and left leg pain along L5 for about a year that is worsening, even waking her up from her sleep. She tried physical therapy, which gave her great relief while she was doing it, but once she stopped, the pain returned. She has tried ibuprofen, which gives her some relief. Lumbar MRI shows moderate narrowing at L5/S1.  - Schedule left L5/S1 TF JESUSITA (with " sedation).  - Consider adding gabapentin in the future if needed.   - Consider re-starting PT post-injection.   - RTC after injection if needed.  I discussed the risks, benefits, and alternatives to potential treatment options. All questions and concerns were fully addressed today in clinic. Dr. Coppola was consulted regarding the patient plan and agrees.

## 2018-01-26 ENCOUNTER — HOSPITAL ENCOUNTER (OUTPATIENT)
Facility: HOSPITAL | Age: 68
Discharge: HOME | End: 2018-01-26
Attending: ANESTHESIOLOGY | Admitting: ANESTHESIOLOGY
Payer: MEDICARE

## 2018-01-26 ENCOUNTER — HOSPITAL ENCOUNTER (OUTPATIENT)
Dept: RADIOLOGY | Facility: HOSPITAL | Age: 68
Discharge: HOME | End: 2018-01-26
Attending: PHYSICIAN ASSISTANT | Admitting: ANESTHESIOLOGY
Payer: MEDICARE

## 2018-01-26 ENCOUNTER — SURGERY (OUTPATIENT)
Age: 68
End: 2018-01-26

## 2018-01-26 VITALS
OXYGEN SATURATION: 99 % | HEIGHT: 59 IN | DIASTOLIC BLOOD PRESSURE: 73 MMHG | TEMPERATURE: 98 F | WEIGHT: 164 LBS | RESPIRATION RATE: 15 BRPM | BODY MASS INDEX: 33.06 KG/M2 | HEART RATE: 64 BPM | SYSTOLIC BLOOD PRESSURE: 146 MMHG

## 2018-01-26 DIAGNOSIS — M54.16 LUMBAR RADICULOPATHY: ICD-10-CM

## 2018-01-26 PROCEDURE — 64483 NJX AA&/STRD TFRM EPI L/S 1: CPT | Mod: LT,,, | Performed by: ANESTHESIOLOGY

## 2018-01-26 PROCEDURE — 25000003 PHARM REV CODE 250

## 2018-01-26 PROCEDURE — 63600175 PHARM REV CODE 636 W HCPCS: Performed by: ANESTHESIOLOGY

## 2018-01-26 PROCEDURE — 64483 NJX AA&/STRD TFRM EPI L/S 1: CPT

## 2018-01-26 RX ORDER — DIAZEPAM 5 MG/1
5 TABLET ORAL ONCE
Status: COMPLETED | OUTPATIENT
Start: 2018-01-26 | End: 2018-01-26

## 2018-01-26 RX ORDER — DEXAMETHASONE SODIUM PHOSPHATE 10 MG/ML
INJECTION INTRAMUSCULAR; INTRAVENOUS
Status: DISCONTINUED | OUTPATIENT
Start: 2018-01-26 | End: 2018-01-26 | Stop reason: HOSPADM

## 2018-01-26 RX ADMIN — DEXAMETHASONE SODIUM PHOSPHATE 10 MG: 10 INJECTION, SOLUTION INTRAMUSCULAR; INTRAVENOUS at 12:01

## 2018-01-26 RX ADMIN — DIAZEPAM 5 MG: 5 TABLET ORAL at 11:01

## 2018-01-26 NOTE — PLAN OF CARE
Problem: Patient Care Overview  Goal: Plan of Care Review  Outcome: Outcome(s) achieved Date Met: 01/26/18  Patient d/c home in stable condition via wheelchair with ride. Verbalized understanding of d/c instructions. Patient voiced no complaints at this time. Patient stood at side of bed, walked steps with no new motor deficits. Neurologically intact.

## 2018-01-29 NOTE — OP NOTE
"Procedure: Lumbar Transforaminal Epidural Steroid Injection under Fluoroscopic Guidance (supraneural approach)     Level: L5/S1      Side: Left     PROCEDURE DATE: 1/26/2018     Pre-operative Diagnosis: Lumbar Radiculopathy  Post-operative Diagnosis: Lumbar Radiculopathy     Provider: Yuri Coppola MD  Assistant(s): None     Anesthesia: Local    >> 0 mg of VERSED    >> 0 mcg of FENTANYL      Indication: Low back pain with radiculopathy consistent with distribution of targeted nerve. Symptoms unresponsive to conservative treatments. Fluoroscopy was used to optimize visualization of needle placement and to maximize safety.      Procedure Description / Technique:  The patient was seen and identified in the preoperative area. Risks, benefits, complications, and alternatives were discussed with the patient. The patient agreed to proceed with the procedure and signed the consent. The site and side of the procedure was identified and marked. An IV was not placed for this procedure. The patient was taken to the procedural suite.     The patient was positioned in prone orientation on procedure table and a pillow was placed under the abdomen to reduce lumbar lordosis. A time out was performed prior to any intervention. The procedure, site, side, and allergies were stated and agreed to by all present. The lumbosacral area was widely prepped with ChloraPrep. The procedural site was draped in usual sterile fashion. Vital signs were closely monitored throughout this procedure. Conscious sedation was not used for this procedure.     The target area was visualized under fluoroscopy. The cephalocaudal angle of the fluoroscope was adjusted as to align the vertebral end plates. The fluoroscopic arm was rotated ipsilaterally to an angle of approximately 30 degrees until the "palak dog" outline came into view and the tip of the inferior superior articular process pointed towards the midline, 6:00 position of the above pedicle. A 25 " "gauge 3.5 inch spinal needle was directed towards the "chin" of the "palak dog" (adjacent to the pars interarticularis and inferior to the pedicle). The needle was advanced until OS was met at the inferior border of the pedicle / pars interface. The needle was adjusted so that it would pass inferior to the osseous border. The fluoroscope was then placed in the lateral position and the needle was slowly advanced until it rested in the posterior 1/3rd of the vertebral foramen. AP fluoroscopy was checked and the needle tip rested at the 6:00 position under the pedicle. No paresthesia was elicited during needle placement. With the needle tip in its final position, gentle aspiration was negative for blood and CSF. Omnipaque 240 (1 to 2 mL) was injected under live fluoroscopy. Microbore tubing was used for injection. There was no pain or paresthesia on injection. The contrast clearly delineated the targeted nerve root on AP fluoroscopy. No vascular uptake was seen. A solution containing 1 mL of 1% PF Lidocaine and 1 mL of Dexamethasone (10 mg/mL) was mixed and 2 mL was injected slowly at each level targeted. There was minimal resistance on injection. No pain or paresthesia was elicited on injection. The stylet was replaced and the needle was withdrawn intact. This procedure was performed for each of the above indicated levels.      Description of Findings: Not applicable     Prosthetic devices, grafts, tissues, or devices implanted: None     Specimen Removed: No     Estimated Blood Loss: minimal     COMPLICATIONS: None     DISPOSITION / PLANS: The patient was transferred to the recovery area in a stable condition for observation. The patient was reexamined prior to discharge. There was no evidence of acute neurologic injury following the procedure.  Patient was discharged from the recovery room after meeting discharge criteria. Home discharge instructions were given to the patient by the staff.  "

## 2018-01-29 NOTE — PROCEDURES
Procedure: Lumbar Transforaminal Epidural Steroid Injection under Fluoroscopic Guidance (supraneural approach)    Level: L5/S1     Side: Left    PROCEDURE DATE:  1/26/2018    Procedure: Lumbar Transforaminal Epidural Steroid Injection under Fluoroscopic Guidance (supraneural approach)     Level: L5/S1      Side: Left     PROCEDURE DATE: 1/26/2018     Pre-operative Diagnosis: Lumbar Radiculopathy  Post-operative Diagnosis: Lumbar Radiculopathy     Provider: Yuri Coppola MD  Assistant(s): None     Anesthesia: Local    >> 0 mg of VERSED    >> 0 mcg of FENTANYL      Indication: Low back pain with radiculopathy consistent with distribution of targeted nerve. Symptoms unresponsive to conservative treatments. Fluoroscopy was used to optimize visualization of needle placement and to maximize safety.      Procedure Description / Technique:  The patient was seen and identified in the preoperative area. Risks, benefits, complications, and alternatives were discussed with the patient. The patient agreed to proceed with the procedure and signed the consent. The site and side of the procedure was identified and marked. An IV was not placed for this procedure. The patient was taken to the procedural suite.     The patient was positioned in prone orientation on procedure table and a pillow was placed under the abdomen to reduce lumbar lordosis. A time out was performed prior to any intervention. The procedure, site, side, and allergies were stated and agreed to by all present. The lumbosacral area was widely prepped with ChloraPrep. The procedural site was draped in usual sterile fashion. Vital signs were closely monitored throughout this procedure. Conscious sedation was not used for this procedure.     The target area was visualized under fluoroscopy. The cephalocaudal angle of the fluoroscope was adjusted as to align the vertebral end plates. The fluoroscopic arm was rotated ipsilaterally to an angle of approximately 30 degrees  "until the "palak dog" outline came into view and the tip of the inferior superior articular process pointed towards the midline, 6:00 position of the above pedicle. A 25 gauge 3.5 inch spinal needle was directed towards the "chin" of the "palak dog" (adjacent to the pars interarticularis and inferior to the pedicle). The needle was advanced until OS was met at the inferior border of the pedicle / pars interface. The needle was adjusted so that it would pass inferior to the osseous border. The fluoroscope was then placed in the lateral position and the needle was slowly advanced until it rested in the posterior 1/3rd of the vertebral foramen. AP fluoroscopy was checked and the needle tip rested at the 6:00 position under the pedicle. No paresthesia was elicited during needle placement. With the needle tip in its final position, gentle aspiration was negative for blood and CSF. Omnipaque 240 (1 to 2 mL) was injected under live fluoroscopy. Microbore tubing was used for injection. There was no pain or paresthesia on injection. The contrast clearly delineated the targeted nerve root on AP fluoroscopy. No vascular uptake was seen. A solution containing 1 mL of 1% PF Lidocaine and 1 mL of Dexamethasone (10 mg/mL) was mixed and 2 mL was injected slowly at each level targeted. There was minimal resistance on injection. No pain or paresthesia was elicited on injection. The stylet was replaced and the needle was withdrawn intact. This procedure was performed for each of the above indicated levels.      Description of Findings: Not applicable     Prosthetic devices, grafts, tissues, or devices implanted: None     Specimen Removed: No     Estimated Blood Loss: minimal     COMPLICATIONS: None     DISPOSITION / PLANS: The patient was transferred to the recovery area in a stable condition for observation. The patient was reexamined prior to discharge. There was no evidence of acute neurologic injury following the procedure.  " "Patient was discharged from the recovery room after meeting discharge criteria. Home discharge instructions were given to the patient by the staff.    Pre-operative Diagnosis: Lumbar Radiculopathy  Post-operative Diagnosis: Lumbar Radiculopathy    Provider: Yuri Coppola MD  Assistant(s): None    Anesthesia: Local    >> 0 mg of VERSED    >> 0 mcg of FENTANYL     Indication: Low back pain with radiculopathy consistent with distribution of targeted nerve. Symptoms unresponsive to conservative treatments. Fluoroscopy was used to optimize visualization of needle placement and to maximize safety.     Procedure Description / Technique:  The patient was seen and identified in the preoperative area. Risks, benefits, complications, and alternatives were discussed with the patient. The patient agreed to proceed with the procedure and signed the consent. The site and side of the procedure was identified and marked. An IV was not placed for this procedure. The patient was taken to the procedural suite.    The patient was positioned in prone orientation on procedure table and a pillow was placed under the abdomen to reduce lumbar lordosis. A time out was performed prior to any intervention. The procedure, site, side, and allergies were stated and agreed to by all present. The lumbosacral area was widely prepped with ChloraPrep. The procedural site was draped in usual sterile fashion. Vital signs were closely monitored throughout this procedure. Conscious sedation was not used for this procedure.    The target area was visualized under fluoroscopy. The cephalocaudal angle of the fluoroscope was adjusted as to align the vertebral end plates. The fluoroscopic arm was rotated ipsilaterally to an angle of approximately 30 degrees until the "palak dog" outline came into view and the tip of the inferior superior articular process pointed towards the midline, 6:00 position of the above pedicle. A 25 gauge 3.5 inch spinal needle was " "directed towards the "chin" of the "palak dog" (adjacent to the pars interarticularis and inferior to the pedicle). The needle was advanced until OS was met at the inferior border of the pedicle / pars interface. The needle was adjusted so that it would pass inferior to the osseous border. The fluoroscope was then placed in the lateral position and the needle was slowly advanced until it rested in the posterior 1/3rd of the vertebral foramen. AP fluoroscopy was checked and the needle tip rested at the 6:00 position under the pedicle. No paresthesia was elicited during needle placement. With the needle tip in its final position, gentle aspiration was negative for blood and CSF. Omnipaque 240 (1 to 2 mL) was injected under live fluoroscopy. Microbore tubing was used for injection. There was no pain or paresthesia on injection. The contrast clearly delineated the targeted nerve root on AP fluoroscopy. No vascular uptake was seen. A solution containing 1 mL of 1% PF Lidocaine and 1 mL of Dexamethasone (10 mg/mL) was mixed and 2 mL was injected slowly at each level targeted. There was minimal resistance on injection. No pain or paresthesia was elicited on injection. The stylet was replaced and the needle was withdrawn intact. This procedure was performed for each of the above indicated levels.     Description of Findings: Not applicable    Prosthetic devices, grafts, tissues, or devices implanted: None    Specimen Removed: No    Estimated Blood Loss: minimal    COMPLICATIONS: None    DISPOSITION / PLANS: The patient was transferred to the recovery area in a stable condition for observation. The patient was reexamined prior to discharge. There was no evidence of acute neurologic injury following the procedure.  Patient was discharged from the recovery room after meeting discharge criteria. Home discharge instructions were given to the patient by the staff.  "

## 2018-01-29 NOTE — DISCHARGE SUMMARY
[facility]  Discharge Note       Description of Procedure: Left L5/S1 Lumbar Transforaminal Epidural Steroid Injection under Fluoroscopic Guidance    Procedure Date:  1/26/2018    Admit Date: 1/26/2018  Discharge Date: 1/26/2018     Attending Physician: Abdon Welch   Discharge Provider: Abdon Welch    Preoperative Diagnosis: Lumbar Spondylosis, Lumbar Radiculopathy     Postoperative Diagnosis: as above, same as preoperative diagnosis    Discharged Condition: Stable    Hospital Course: Patient was admitted for an outpatient procedure. The procedure was tolerated well with no complications.    Final Diagnoses: Same as principal problem.    Disposition: Home, self-care.    Follow up/Patient Instructions:  Follow-up in clinic in 2-3 weeks.    Medications: No medications were prescribed today. The patient was advised to resume normal medication regimen without change.  Specific information was provided regarding restarting any anticoagulant/s.    Discharge Procedure Orders (must include Diet, Follow-up, Activity):  Light activity for the remainder of the day, resume normal activity tomorrow. Resume normal diet. Follow-up in clinic in 2-3 weeks.

## 2018-02-06 ENCOUNTER — PATIENT MESSAGE (OUTPATIENT)
Dept: INTERNAL MEDICINE | Facility: CLINIC | Age: 68
End: 2018-02-06

## 2018-02-14 ENCOUNTER — PATIENT MESSAGE (OUTPATIENT)
Dept: INTERNAL MEDICINE | Facility: CLINIC | Age: 68
End: 2018-02-14

## 2018-02-14 RX ORDER — LEVOTHYROXINE SODIUM 50 UG/1
50 TABLET ORAL DAILY
Qty: 90 TABLET | Refills: 3 | Status: SHIPPED | OUTPATIENT
Start: 2018-02-14 | End: 2019-01-07 | Stop reason: SDUPTHER

## 2018-02-27 ENCOUNTER — OFFICE VISIT (OUTPATIENT)
Dept: PAIN MEDICINE | Facility: CLINIC | Age: 68
End: 2018-02-27
Payer: MEDICARE

## 2018-02-27 VITALS
HEIGHT: 60 IN | DIASTOLIC BLOOD PRESSURE: 77 MMHG | BODY MASS INDEX: 32.39 KG/M2 | HEART RATE: 70 BPM | WEIGHT: 165 LBS | SYSTOLIC BLOOD PRESSURE: 122 MMHG

## 2018-02-27 DIAGNOSIS — G89.4 CHRONIC PAIN DISORDER: ICD-10-CM

## 2018-02-27 DIAGNOSIS — M48.061 LUMBAR FORAMINAL STENOSIS: ICD-10-CM

## 2018-02-27 DIAGNOSIS — M51.36 DDD (DEGENERATIVE DISC DISEASE), LUMBAR: ICD-10-CM

## 2018-02-27 DIAGNOSIS — M70.62 GREATER TROCHANTERIC BURSITIS, LEFT: ICD-10-CM

## 2018-02-27 DIAGNOSIS — M54.16 LUMBAR RADICULOPATHY: Primary | ICD-10-CM

## 2018-02-27 DIAGNOSIS — M46.1 SACROILIITIS: ICD-10-CM

## 2018-02-27 DIAGNOSIS — M47.817 LUMBOSACRAL SPONDYLOSIS WITHOUT MYELOPATHY: ICD-10-CM

## 2018-02-27 PROCEDURE — 99214 OFFICE O/P EST MOD 30 MIN: CPT | Mod: S$PBB,,, | Performed by: PHYSICIAN ASSISTANT

## 2018-02-27 PROCEDURE — 99999 PR PBB SHADOW E&M-EST. PATIENT-LVL IV: CPT | Mod: PBBFAC,,, | Performed by: PHYSICIAN ASSISTANT

## 2018-02-27 PROCEDURE — 1126F AMNT PAIN NOTED NONE PRSNT: CPT | Mod: ,,, | Performed by: PHYSICIAN ASSISTANT

## 2018-02-27 PROCEDURE — 99214 OFFICE O/P EST MOD 30 MIN: CPT | Mod: PBBFAC | Performed by: PHYSICIAN ASSISTANT

## 2018-02-27 PROCEDURE — 1159F MED LIST DOCD IN RCRD: CPT | Mod: ,,, | Performed by: PHYSICIAN ASSISTANT

## 2018-02-27 NOTE — PATIENT INSTRUCTIONS
Exercises to Strengthen Your Lower Back  Strong lower back and abdominal muscles work together to support your spine. The exercises below will help strengthen the lower back. It is important that you begin exercising slowly and increase levels gradually.  Always begin any exercise program with stretching. If you feel pain while doing any of these exercises, stop and talk to your doctor about a more specific exercise program that better suits your condition.   Low back stretch  The point of stretching is to make you more flexible and increase your range of motion. Stretch only as much as you are able. Stretch slowly. Do not push your stretch to the limit. If at any point you feel pain while stretching, this is your (temporary) limit.  · Lie on your back with your knees bent and both feet on the ground.  · Slowly raise your left knee to your chest as you flatten your lower back against the floor. Hold for 5 seconds.  · Relax and repeat the exercise with your right knee.  · Do 10 of these exercises for each leg.  · Repeat hugging both knees to your chest at the same time.  Building lower back strength  Start your exercise routine with 10 to 30 minutes a day, 1 to 3 times a day.  Initial exercises  Lying on your back:  1. Ankle pumps: Move your foot up and down, towards your head, and then away. Repeat 10 times with each foot.  2. Heel slides: Slowly bend your knee, drawing the heel of your foot towards you. Then slide your heel/foot from you, straightening your knee. Do not lift your foot off the floor (this is not a leg lift).  3. Abdominal contraction: Bend your knees and put your hands on your stomach. Tighten your stomach muscles. Hold for 5 seconds, then relax. Repeat 10 times.  4. Straight leg raise: Bend one leg at the knee and keep the other leg straight. Tighten your stomach muscles. Slowly lift your straight leg 6 to 12 inches off the floor and hold for up to 5 seconds. Repeat 10 times on each  side.  Standin. Wall squats: Stand with your back against the wall. Move your feet about 12 inches away from the wall. Tighten your stomach muscles, and slowly bend your knees until they are at about a 45 degree angle. Do not go down too far. Hold about 5 seconds. Then slowly return to your starting position. Repeat 10 times.  2. Heel raises: Stand facing the wall. Slowly raise the heels of your feet up and down, while keeping your toes on the floor. If you have trouble balancing, you can touch the wall with your hands. Repeat 10 times.  More advanced exercises  When you feel comfortable enough, try these exercises.  1. Kneeling lumbar extension: Begin on your hands and knees. At the same time, raise and straighten your right arm and left leg until they are parallel to the ground. Hold for 2 seconds and come back slowly to a starting position. Repeat with left arm and right leg, alternating 10 times.  2. Prone lumbar extension: Lie face down, arms extended overhead, palms on the floor. At the same time, raise your right arm and left leg as high as comfortably possible. Hold for 10 seconds and slowly return to start. Repeat with left arm and right leg, alternating 10 times. Gradually build up to 20 times. (Advanced: Repeat this exercise raising both arms and both legs a few inches off the floor at the same time. Hold for 5 seconds and release.)  3. Pelvic tilt: Lie on the floor on your back with your knees bent at 90 degrees. Your feet should be flat on the floor. Inhale, exhale, then slowly contract your abdominal muscles bringing your navel toward your spine. Let your pelvis rock back until your lower back is flat on the floor. Hold for 10 seconds while breathing smoothly.  4. Abdominal crunch: Perform a pelvic tilt (above) flattening your lower back against the floor. Holding the tension in your abdominal muscles, take another breath and raise your shoulder blades off the ground (this is not a full sit-up).  Keep your head in line with your body (dont bend your neck forward). Hold for 2 seconds, then slowly lower.  © 4683-7162 [employer]. [address] [place] [address], [place] [address]. All rights reserved. This information is not intended as a substitute for professional medical care. Always follow your healthcare professional's instructions.

## 2018-02-27 NOTE — PROGRESS NOTES
Chief Pain Complaint:  Lower back pain    Interval History: Patient was seen on 1/26/18.  At that time, she underwent a left L5/S1 TF JESUSITA (with sedation). The patient reports that she is/was unchanged following the procedure. The changes lasted 4 weeks. The changes have continued through this visit.     History of Present Illness:   This patient is a 68 y.o. female who presents today complaining of the above noted pain/s. The patient describes the pain as follows.    - duration of pain: > 1 year   - timing: intermittent   - character: shooting, sharp  - radiating, dermatomal: extends into left leg along ~L5 (radiates down posterolateral leg then wraps around to shin)  - antecedent trauma, prior spinal surgery: no prior trauma, no prior spinal surgery   - aggravating factors: sitting, lying down for extended periods  - alleviating factors: walking  - pertinent negatives: No fever, No chills, No weight loss, No bladder dysfunction, No bowel dysfunction, No extremity weakness, No saddle anesthesia  - pertinent positives: none    - medications, other therapies tried (physical therapy, injections):     >> Ibuprofen    >> Has previously undergone Physical Therapy with relief while she was in it, but pain returned once she stopped    >> Has previously undergone spinal injection/s:   -  left L5/S1 TF JESUSITA (with sedation) on 1-26-18      Imaging / Labs / Studies (reviewed on 2/27/2018):    Results for orders placed during the hospital encounter of 01/05/18   MRI Lumbar Spine Without Contrast    Narrative TECHNIQUE: MRI lumbar spine was performed without contrast on a 1.5T magnet. The following sequences were obtained: Localizer; sagittal T1, T2, STIR; axial T1 and T2.  COMPARISON: Not available.  FINDINGS:  There are 5 lumbar vertebrae.  Vertebral body heights and alignment are maintained.  There is moderate to severe disc height loss at L5-S1 with associated degenerative endplate changes.  Disc desiccation and mild disc  height loss noted from L2-L3 through L4-L5.  Multilevel Schmorl's nodes are present in the lower thoracic and upper lumbar spine.  Small hemangioma noted within the L1 vertebral body.  No evidence of fracture or marrow replacement process.   Conus terminates at L1 and appears unremarkable. Limited evaluation of posterior abdominal structures is unremarkable.  Paraspinal musculature is within normal limits.    L1-L2: No spinal canal stenosis or neuroforaminal narrowing.  L2-L3: Small left paracentral disc protrusion superimposed upon more generalized disc bulge. Mild bilateral neural foraminal narrowing.  No spinal canal stenosis.  L3-L4: Mild generalized disc bulge. Mild bilateral neuroforaminal narrowing.  No spinal canal stenosis.  L4-L5: Generalized disc bulge and mild bilateral facet arthropathy. Mild bilateral neural foraminal narrowing.  No spinal canal stenosis.  L5-S1: Mild generalized disc bulge and mild bilateral facet arthropathy. Moderate bilateral neuroforaminal narrowing.  No spinal canal stenosis.       Results for orders placed during the hospital encounter of 07/26/17   X-Ray Lumbar Spine AP And Lateral    Narrative Comparison: Chest x-ray dated 03/12/2014  Technique: AP and lateral views were obtained of the lumbar spine  Findings: Slight anterior wedge deformity of the T12 vertebral body appears unchanged similar to chest x-ray.  There is slight dextroscoliotic curvature of the visualized thoracic spine.  Lumbar vertebral body heights and alignment are otherwise within normal limits. There is mild diffuse disc height loss throughout the lumbar spine with moderate disc height loss at L5-S1.  Bilateral facet arthropathy suspected at L5-S1. Posterior elements appear grossly intact. No acute fractures or subluxations are demonstrated.  The remaining visualized osseous and soft tissue structures demonstrate no appreciable abnormality.         Review of Systems:  CONSTITUTIONAL: patient denies any  fever, chills, or weight loss  SKIN: patient denies any rash or itching  RESPIRATORY: patient denies having any shortness of breath  GASTROINTESTINAL: patient denies having any diarrhea, constipation, or bowel incontinence  GENITOURINARY: patient denies having any abnormal bladder function    MUSCULOSKELETAL:  - patient complains of the above noted pain/s (see chief pain complaint)    NEUROLOGICAL:   - pain as above  - strength in Lower extremities is intact, BILATERALLY  - sensation in Lower extremities is abnormal, on the LEFT  - patient denies any loss of bowel or bladder control      PSYCHIATRIC: patient denies any change in mood    Other:  All other systems reviewed and are negative      Physical Exam:  Vitals:  /77 (BP Location: Right arm, Patient Position: Sitting)   Pulse 70   Ht 5' (1.524 m)   Wt 74.8 kg (165 lb)   BMI 32.22 kg/m²    (reviewed on 2/27/2018)    General: alert and oriented, in no apparent distress  Gait: normal gait  Skin: No rashes, No discoloration, No obvious lesions  HEENT: EOMI  Cardiovascular: no significant peripheral edema present  Respiratory: respirations nonlabored    Musculoskeletal:  - Any pain on flexion, extension, rotation of lumbar spine:    >> pain on flexion    >> no pain on extension     >> facet loading causes pain contralaterally  - Straight Leg Raise:     >> LEFT :: negative    >> RIGHT :: negative  - Any tenderness to palpation across paraspinal muscles, joints, bursae:     >> across lumbar paraspinals, mild    >> very tender over left SIJ    >> very tender over left GT bursa    Neuro:  - Extremity Strength:     >> LEFT :: 5/5    >> RIGHT :: 5/5  - Extremity Reflexes:    >> LEFT  :: 2+    >> RIGHT :: 2+  - Sensory (sensation to light touch):    >> LEFT :: intact    >> RIGHT :: intact     Psych:  Mood and affect is appropriate      Assessment:  Lumbar Degenerative Disc Disease  Lumbar Radiculopathy      Plan:  Patient complains of low back and left leg pain  along L5 for about a year that is worsening, even waking her up from her sleep. She tried physical therapy, which gave her great relief while she was doing it, but once she stopped, the pain returned. She has tried ibuprofen, which gives her some relief. Lumbar MRI shows moderate narrowing at L5/S1.  - S/p left L5/S1 TF JESUSITA (with sedation) on 1-26-18. She reports minimal relief with this, but she also reports 0/10 pain today. She states that her pain is positional.  - Start PT with passive modalities, including ice and heat, and active modalities, including a regimen for stretching and strengthening. Order sent to [facility] in [place].   - If no relief with PT, consider left GT bursa + left SIJ injection. She wants to try PT again first.  - Consider adding gabapentin in the future if needed.   - RTC PRN.  I discussed the risks, benefits, and alternatives to potential treatment options. All questions and concerns were fully addressed today in clinic. Dr. Coppola was consulted regarding the patient plan and agrees.

## 2018-03-20 ENCOUNTER — TELEPHONE (OUTPATIENT)
Dept: RHEUMATOLOGY | Facility: CLINIC | Age: 68
End: 2018-03-20

## 2018-03-20 NOTE — TELEPHONE ENCOUNTER
----- Message from Cheyenne Juan sent at 3/20/2018  1:58 PM CDT -----  Contact: patient  Calling to schedule a prolia injection and need lab orders. Please call patient today @ [phone]. thanks, reji

## 2018-03-21 ENCOUNTER — PATIENT MESSAGE (OUTPATIENT)
Dept: INTERNAL MEDICINE | Facility: CLINIC | Age: 68
End: 2018-03-21

## 2018-03-27 ENCOUNTER — OFFICE VISIT (OUTPATIENT)
Dept: RHEUMATOLOGY | Facility: CLINIC | Age: 68
End: 2018-03-27
Payer: MEDICARE

## 2018-03-27 ENCOUNTER — LAB VISIT (OUTPATIENT)
Dept: LAB | Facility: HOSPITAL | Age: 68
End: 2018-03-27
Attending: FAMILY MEDICINE
Payer: MEDICARE

## 2018-03-27 VITALS
DIASTOLIC BLOOD PRESSURE: 62 MMHG | HEIGHT: 60 IN | BODY MASS INDEX: 32.89 KG/M2 | HEART RATE: 56 BPM | SYSTOLIC BLOOD PRESSURE: 108 MMHG | WEIGHT: 167.56 LBS

## 2018-03-27 DIAGNOSIS — M81.0 OSTEOPOROSIS: ICD-10-CM

## 2018-03-27 DIAGNOSIS — E03.9 HYPOTHYROIDISM, UNSPECIFIED TYPE: ICD-10-CM

## 2018-03-27 DIAGNOSIS — Z51.81 MEDICATION MONITORING ENCOUNTER: ICD-10-CM

## 2018-03-27 DIAGNOSIS — M81.0 AGE-RELATED OSTEOPOROSIS WITHOUT CURRENT PATHOLOGICAL FRACTURE: Primary | ICD-10-CM

## 2018-03-27 DIAGNOSIS — E78.5 HYPERLIPIDEMIA, UNSPECIFIED HYPERLIPIDEMIA TYPE: ICD-10-CM

## 2018-03-27 DIAGNOSIS — E55.9 VITAMIN D DEFICIENCY DISEASE: ICD-10-CM

## 2018-03-27 LAB
25(OH)D3+25(OH)D2 SERPL-MCNC: 37 NG/ML
ALBUMIN SERPL BCP-MCNC: 3.9 G/DL
ALP SERPL-CCNC: 60 U/L
ALT SERPL W/O P-5'-P-CCNC: 15 U/L
ANION GAP SERPL CALC-SCNC: 6 MMOL/L
AST SERPL-CCNC: 19 U/L
BASOPHILS # BLD AUTO: 0.03 K/UL
BASOPHILS NFR BLD: 0.6 %
BILIRUB SERPL-MCNC: 0.8 MG/DL
BUN SERPL-MCNC: 14 MG/DL
CALCIUM SERPL-MCNC: 9.5 MG/DL
CHLORIDE SERPL-SCNC: 104 MMOL/L
CHOLEST SERPL-MCNC: 186 MG/DL
CHOLEST/HDLC SERPL: 2.1 {RATIO}
CO2 SERPL-SCNC: 29 MMOL/L
CREAT SERPL-MCNC: 0.9 MG/DL
DIFFERENTIAL METHOD: ABNORMAL
EOSINOPHIL # BLD AUTO: 0.2 K/UL
EOSINOPHIL NFR BLD: 3.4 %
ERYTHROCYTE [DISTWIDTH] IN BLOOD BY AUTOMATED COUNT: 12.6 %
EST. GFR  (AFRICAN AMERICAN): >60 ML/MIN/1.73 M^2
EST. GFR  (NON AFRICAN AMERICAN): >60 ML/MIN/1.73 M^2
GLUCOSE SERPL-MCNC: 67 MG/DL
HCT VFR BLD AUTO: 41.1 %
HDLC SERPL-MCNC: 88 MG/DL
HDLC SERPL: 47.3 %
HGB BLD-MCNC: 13.2 G/DL
IMM GRANULOCYTES # BLD AUTO: 0.01 K/UL
IMM GRANULOCYTES NFR BLD AUTO: 0.2 %
LDLC SERPL CALC-MCNC: 88.4 MG/DL
LYMPHOCYTES # BLD AUTO: 1.6 K/UL
LYMPHOCYTES NFR BLD: 30.1 %
MCH RBC QN AUTO: 31.1 PG
MCHC RBC AUTO-ENTMCNC: 32.1 G/DL
MCV RBC AUTO: 97 FL
MONOCYTES # BLD AUTO: 0.4 K/UL
MONOCYTES NFR BLD: 7.1 %
NEUTROPHILS # BLD AUTO: 3.1 K/UL
NEUTROPHILS NFR BLD: 58.6 %
NONHDLC SERPL-MCNC: 98 MG/DL
NRBC BLD-RTO: 0 /100 WBC
PLATELET # BLD AUTO: 193 K/UL
PMV BLD AUTO: 10.5 FL
POTASSIUM SERPL-SCNC: 4.7 MMOL/L
PROT SERPL-MCNC: 7 G/DL
RBC # BLD AUTO: 4.25 M/UL
SODIUM SERPL-SCNC: 139 MMOL/L
T4 FREE SERPL-MCNC: 1.23 NG/DL
TRIGL SERPL-MCNC: 48 MG/DL
TSH SERPL DL<=0.005 MIU/L-ACNC: 2.69 UIU/ML
WBC # BLD AUTO: 5.34 K/UL

## 2018-03-27 PROCEDURE — 99999 PR PBB SHADOW E&M-EST. PATIENT-LVL III: CPT | Mod: PBBFAC,,, | Performed by: PHYSICIAN ASSISTANT

## 2018-03-27 PROCEDURE — 84443 ASSAY THYROID STIM HORMONE: CPT

## 2018-03-27 PROCEDURE — 82306 VITAMIN D 25 HYDROXY: CPT

## 2018-03-27 PROCEDURE — 99213 OFFICE O/P EST LOW 20 MIN: CPT | Mod: PBBFAC,PO | Performed by: PHYSICIAN ASSISTANT

## 2018-03-27 PROCEDURE — 85025 COMPLETE CBC W/AUTO DIFF WBC: CPT

## 2018-03-27 PROCEDURE — 36415 COLL VENOUS BLD VENIPUNCTURE: CPT | Mod: PO

## 2018-03-27 PROCEDURE — 99214 OFFICE O/P EST MOD 30 MIN: CPT | Mod: S$PBB,,, | Performed by: PHYSICIAN ASSISTANT

## 2018-03-27 PROCEDURE — 80053 COMPREHEN METABOLIC PANEL: CPT

## 2018-03-27 PROCEDURE — 84439 ASSAY OF FREE THYROXINE: CPT

## 2018-03-27 PROCEDURE — 80061 LIPID PANEL: CPT

## 2018-03-27 NOTE — PROGRESS NOTES
Subjective:       Patient ID: Rajni Melchor is a 68 y.o. female.    Chief Complaint: Osteoporosis and Vitamin D Deficiency    Rajni is back for rheumatology follow-up. She has osteoporosis by DEXA along with Vitamin D deficiency. Last DEXA on 8/15/2017 showed femur neck T-score of -2.5 and spine -1.6, this was improved from -2.7 and -2.1 respectively.  She has a history of multiple fractures in the past, R humerus shattered with fall in 2007 and L radius in 2014.  Had failed Evista and oral medications, so moved to Prolia 60 mg subcutaneous injection every 6 months has been continued thus far. .  She has received 4 injections thus far, her last 8/15/17. She is here today for evaluation of her OP and determination if we will continue her prolia.      She is on a daily ca, zinc, mag supplement. At last visit started taking vitamin D 2000 every day, previously had been taking it only once a week with a vitamin D level of 24.  She denies any falls or fractures since last visit.  Pain level 1/10 in her low back. She is followed by Dr Coppola in pain mgmt for lumbar spondylosis and lumbar stenosis. States her low back pain is improved with home exercises.          Review of Systems   Constitutional: Negative.  Negative for activity change, appetite change, chills, fatigue and fever.   HENT: Negative.  Negative for mouth sores and trouble swallowing.         No dry mouth   Eyes: Negative.  Negative for photophobia, pain and redness.        No swollen or red eyes, no dry eye     Respiratory: Negative.  Negative for chest tightness, shortness of breath, wheezing and stridor.    Cardiovascular: Negative.  Negative for chest pain.   Gastrointestinal: Negative.  Negative for abdominal pain, blood in stool, diarrhea, nausea and vomiting.   Genitourinary: Negative.  Negative for dysuria, frequency, hematuria and urgency.   Musculoskeletal: Positive for back pain. Negative for arthralgias, gait problem, joint swelling,  "myalgias, neck pain and neck stiffness.   Skin: Negative.  Negative for color change, pallor and rash.   Neurological: Negative.  Negative for weakness.   Hematological: Negative for adenopathy.   Psychiatric/Behavioral: Negative for suicidal ideas.         Objective:     /62   Pulse (!) 56   Ht 4' 11.5" (1.511 m)   Wt 76 kg (167 lb 8.8 oz)   BMI 33.27 kg/m²      Physical Exam   Constitutional: She is oriented to person, place, and time and well-developed, well-nourished, and in no distress. No distress.   HENT:   Head: Normocephalic and atraumatic.   Right Ear: External ear normal.   Left Ear: External ear normal.   Mouth/Throat: No oropharyngeal exudate.   Eyes: Conjunctivae and EOM are normal. Pupils are equal, round, and reactive to light. No scleral icterus.   Neck: Normal range of motion. Neck supple. No thyromegaly present.   Cardiovascular: Normal rate, regular rhythm and normal heart sounds.    No murmur heard.  Pulmonary/Chest: Effort normal and breath sounds normal. She exhibits no tenderness.   Abdominal: Soft. Bowel sounds are normal.       Right Side Rheumatological Exam     Examination finds the elbow and wrist normal.    Knee Exam     Range of Motion   The patient has normal right knee ROM.  Patellofemoral Crepitus: positive  Effusion: negative  Warmth: negative    Muscle Strength (0-5 scale):  Biceps: 5/5   Triceps:  5  : 5/5   Iliopsoas: 5  Quadriceps:  5   Distal Lower Extremity: 5    Left Side Rheumatological Exam     Examination finds the elbow and wrist normal.    Knee Exam     Range of Motion   The patient has normal left knee ROM.    Patellofemoral Crepitus: negative  Effusion: negative  Warmth: negative    Muscle Strength (0-5 scale):  Biceps: 5/5   Triceps:  5  :  5/5   Iliopsoas: 5  Quadriceps:  5   Distal Lower Extremity: 5      Lymphadenopathy:     She has no cervical adenopathy.   Neurological: She is alert and oriented to person, place, and time. She displays normal " reflexes. No cranial nerve deficit. She exhibits normal muscle tone. Gait normal.   Skin: Skin is warm and dry. No rash noted.     Musculoskeletal: Normal range of motion. She exhibits no edema, tenderness or deformity.   She has no kyphosis noted in the back.  No tenderness to palpation along the cervical, thoracic, and lumbar spine.                 No results found for this or any previous visit (from the past 168 hour(s)).         DEXA 8/15/17 TF -1.5, FN -2.5, spine -1.6 - impression stable and improved     DEXA 7/27/14 total femur T score -1.6, femur -2.7, spine -2.1 impression osteoporosis     Assessment:       1. Age-related osteoporosis without current pathological fracture    2. Vitamin D deficiency disease    3. Medication monitoring encounter    4. Osteoporosis          1.  Osteoporosis by DEXA now on Prolia X 3 years (since 4/2016) after failing oral medicines including Evista and bisphosphonates with a history of prior fractures and falls. Dexa 8/15/17 improved but still osteoporosis- she has every 6 month apt to evaluate if we will continue prolia.  Labs still pending today. Last GFR and Ca normal. Once her labs are back if renal function and Ca appropriate then will plan to continue her Prola.     2.  Chronic low vitamin D on 2000 IU daily. In the past was only taking 2000 Q week- last d still low at 24 (08/15/2017) - Vit D level pending today. Will review once resulted and adjust if needed.    3.  Medication monitoring with no current issues of toxicity    Plan:           Labs  Done today and results pending and will not be back until tomorrow, once back I will review and addend visit  Set up nurse visit in 2 days for tentative prolia injections  Re-evaluate patient again in 6 months to determine if Prolia still medically indicated and appropriate to administer.    KDIGO lab monitoring will be followed according to KDIGO 2017 Clinical Practice Guideline Update for the Diagnosis, Evaluation,  Prevention, and Treatment of Chronic Kidney Disease-Mineral and Bone Disorder (CKD-MBD)  Chapter 3.1: Diagnosis of CKD-MBD: biochemical abnormalities    Continue once daily ca, mag and zinc  Continue Vit D 2000 IU daily, once d level back if need will adjust dose accordingly     Dexa stable and improved, continue prolia X 2 year and repeat dexa in 2 years (after 8/15/2019)    We'll see her back in 6 months including CMP, vitamin D a few days ahead  At next visit will evaluate if Prolia will be continued    Please call or send portal message with any questions or concerns

## 2018-03-29 ENCOUNTER — CLINICAL SUPPORT (OUTPATIENT)
Dept: RHEUMATOLOGY | Facility: CLINIC | Age: 68
End: 2018-03-29
Payer: MEDICARE

## 2018-03-29 PROCEDURE — 99999 PR PBB SHADOW E&M-EST. PATIENT-LVL II: CPT | Mod: PBBFAC,,,

## 2018-03-29 PROCEDURE — 99212 OFFICE O/P EST SF 10 MIN: CPT | Mod: PBBFAC,PO,25

## 2018-03-29 PROCEDURE — 96372 THER/PROPH/DIAG INJ SC/IM: CPT | Mod: PBBFAC,PO

## 2018-03-29 RX ADMIN — DENOSUMAB 60 MG: 60 INJECTION SUBCUTANEOUS at 10:03

## 2018-03-29 NOTE — PROGRESS NOTES
Administered 1 cc Prolia 60mg/cc  to RLQ of abdomen. Pt tolerated well. No acute reaction noted to site. Pt instructed on S/S to report. Advised patient to wait in lobby 15 minutes after receiving injection to monitor for any reactions. Pt verbalized understanding.       Calcium: 9.5  Creatinine: 0.9  Lot: [id number]  Exp: 06/20

## 2018-04-26 ENCOUNTER — PATIENT MESSAGE (OUTPATIENT)
Dept: INTERNAL MEDICINE | Facility: CLINIC | Age: 68
End: 2018-04-26

## 2018-05-14 ENCOUNTER — OFFICE VISIT (OUTPATIENT)
Dept: URGENT CARE | Facility: CLINIC | Age: 68
End: 2018-05-14
Payer: MEDICARE

## 2018-05-14 VITALS
TEMPERATURE: 97 F | OXYGEN SATURATION: 95 % | BODY MASS INDEX: 33.45 KG/M2 | WEIGHT: 168.44 LBS | SYSTOLIC BLOOD PRESSURE: 120 MMHG | HEART RATE: 79 BPM | DIASTOLIC BLOOD PRESSURE: 80 MMHG

## 2018-05-14 DIAGNOSIS — R39.15 URGENCY OF URINATION: ICD-10-CM

## 2018-05-14 DIAGNOSIS — J30.9 ALLERGIC RHINITIS, UNSPECIFIED SEASONALITY, UNSPECIFIED TRIGGER: Primary | ICD-10-CM

## 2018-05-14 LAB
BILIRUB SERPL-MCNC: NEGATIVE MG/DL
BLOOD URINE, POC: NEGATIVE
COLOR, POC UA: YELLOW
GLUCOSE UR QL STRIP: NORMAL
KETONES UR QL STRIP: NEGATIVE
LEUKOCYTE ESTERASE URINE, POC: NEGATIVE
NITRITE, POC UA: NEGATIVE
PH, POC UA: 6
PROTEIN, POC: NEGATIVE
SPECIFIC GRAVITY, POC UA: 1.01
UROBILINOGEN, POC UA: NORMAL

## 2018-05-14 PROCEDURE — 99999 PR PBB SHADOW E&M-EST. PATIENT-LVL III: CPT | Mod: PBBFAC,,, | Performed by: NURSE PRACTITIONER

## 2018-05-14 PROCEDURE — 99214 OFFICE O/P EST MOD 30 MIN: CPT | Mod: S$PBB,,, | Performed by: NURSE PRACTITIONER

## 2018-05-14 PROCEDURE — 99213 OFFICE O/P EST LOW 20 MIN: CPT | Mod: PBBFAC,PO | Performed by: NURSE PRACTITIONER

## 2018-05-14 PROCEDURE — 81002 URINALYSIS NONAUTO W/O SCOPE: CPT | Mod: PBBFAC,PO | Performed by: NURSE PRACTITIONER

## 2018-05-14 RX ORDER — CETIRIZINE HYDROCHLORIDE 10 MG/1
10 TABLET ORAL DAILY
Qty: 30 TABLET | Refills: 0 | Status: SHIPPED | OUTPATIENT
Start: 2018-05-14 | End: 2018-10-01

## 2018-05-14 NOTE — PATIENT INSTRUCTIONS

## 2018-05-14 NOTE — PROGRESS NOTES
Subjective:       Patient ID: Rajni Melchor is a 68 y.o. female.    Chief Complaint: Sinus Problem    Sinus Problem   This is a new problem. The current episode started yesterday. The problem is unchanged. There has been no fever. She is experiencing no pain. Pertinent negatives include no congestion, coughing, diaphoresis, ear pain, headaches, hoarse voice, neck pain, shortness of breath, sinus pressure, sneezing, sore throat or swollen glands. Past treatments include nothing.     Review of Systems   Constitutional: Negative for diaphoresis.   HENT: Positive for postnasal drip and rhinorrhea. Negative for congestion, ear pain, hoarse voice, sinus pressure, sneezing and sore throat.    Respiratory: Negative for cough and shortness of breath.    Genitourinary: Positive for urgency. Negative for decreased urine volume, difficulty urinating, dyspareunia, vaginal bleeding and vaginal discharge.   Musculoskeletal: Negative for neck pain.   Neurological: Negative for dizziness and headaches.   Hematological: Negative for adenopathy.   Psychiatric/Behavioral: Negative for agitation.       Objective:      Physical Exam   Constitutional: She is oriented to person, place, and time. She appears well-developed and well-nourished.   HENT:   Head: Normocephalic.   Nose: Nose normal.   Mouth/Throat: Uvula is midline and mucous membranes are normal.   Cardiovascular: Normal rate.    Pulmonary/Chest: Effort normal.   Abdominal: Normal appearance and bowel sounds are normal. There is no tenderness.   Neurological: She is alert and oriented to person, place, and time.   Psychiatric: She has a normal mood and affect.   Nursing note and vitals reviewed.      Assessment:       1. Allergic rhinitis, unspecified seasonality, unspecified trigger    2. Urgency of urination        Plan:         Rajni was seen today for sinus problem.    Diagnoses and all orders for this visit:    Allergic rhinitis, unspecified seasonality,  unspecified trigger  -     cetirizine (ZYRTEC) 10 MG tablet; Take 1 tablet (10 mg total) by mouth once daily.    Urgency of urination  -     POCT URINE DIPSTICK WITHOUT MICROSCOPE      Decrease tea consumption and coffee.  Follow prescribed treatment plan as directed.  Stay hydrated and rest.  Report to ER if symptoms worsen.  Follow up with PCP in 2-3 days or sooner if symptoms do not improve.

## 2018-06-05 ENCOUNTER — PATIENT MESSAGE (OUTPATIENT)
Dept: INTERNAL MEDICINE | Facility: CLINIC | Age: 68
End: 2018-06-05

## 2018-06-06 ENCOUNTER — OFFICE VISIT (OUTPATIENT)
Dept: INTERNAL MEDICINE | Facility: CLINIC | Age: 68
End: 2018-06-06
Payer: MEDICARE

## 2018-06-06 VITALS
DIASTOLIC BLOOD PRESSURE: 80 MMHG | WEIGHT: 168.44 LBS | TEMPERATURE: 97 F | SYSTOLIC BLOOD PRESSURE: 124 MMHG | HEART RATE: 66 BPM | HEIGHT: 60 IN | BODY MASS INDEX: 33.07 KG/M2

## 2018-06-06 DIAGNOSIS — E03.9 HYPOTHYROIDISM, UNSPECIFIED TYPE: ICD-10-CM

## 2018-06-06 DIAGNOSIS — M47.817 FACET JOINT DISEASE OF LUMBOSACRAL REGION: ICD-10-CM

## 2018-06-06 DIAGNOSIS — I73.00 RAYNAUD'S PHENOMENON WITHOUT GANGRENE: ICD-10-CM

## 2018-06-06 DIAGNOSIS — M51.36 BULGING OF LUMBAR INTERVERTEBRAL DISC: Primary | ICD-10-CM

## 2018-06-06 PROCEDURE — 99214 OFFICE O/P EST MOD 30 MIN: CPT | Mod: S$PBB,,, | Performed by: FAMILY MEDICINE

## 2018-06-06 PROCEDURE — 99999 PR PBB SHADOW E&M-EST. PATIENT-LVL III: CPT | Mod: PBBFAC,,, | Performed by: FAMILY MEDICINE

## 2018-06-06 PROCEDURE — 99213 OFFICE O/P EST LOW 20 MIN: CPT | Mod: PBBFAC,PO | Performed by: FAMILY MEDICINE

## 2018-06-06 NOTE — PATIENT INSTRUCTIONS
Degenerative Disk Disease    Spinal disks are gel-filled cushions between the bones, or vertebrae, of the spine. The disks act like shock absorbers. Over time, the disks may break down. This is called degenerative disk disease.  This condition can affect the neck or back. It is one of the most common causes of low back pain. It is the leading cause of disability in people under age 45 in the [place]. The pain often remains localized to the lower back or neck. Muscle spasm is often present and adds to the pain.  Disk degeneration is a natural part of aging. But it is not painful for most people. It may also occur as a result of repeated minor injuries due to daily activities, sports, or accidents. It may lead to osteoarthritis of the spine. Back pain related to disk disease may come and go. Or it may become chronic and last for months or years. The disk may bulge or rupture. This is called a slipped disk or herniated disk. That can put pressure on a nearby spinal nerve and cause neck or back pain that spreads down one arm or leg.  X-rays or an MRI may help to diagnose this condition. For acute pain, treatment includes anti-inflammatory medicines, muscle relaxants, rest, ice, or heat. Strong prescription pain medicines, called opioids, may be needed for short-term treatment if pain suddenly gets worse. Opioid medicines can be addictive. So they are not advised for long-term pain management. Other types of medicines are preferred. Surgery is generally not used to treat this condition unless there is a complication.    Home care  · For neck pain: Use a comfortable pillow that supports the head and keeps the spine in a neutral position. Your head should not be tilted forward or backward.  · For back pain: Avoid sitting for long periods of time. This puts more stress on the lower back than standing or walking. Starting a regular exercise program to strengthen the supporting muscles of the spine will make it easier  to live with degenerative disk disease.  · Apply an ice pack over the injured area for no more than 15 to 20 minutes. Do this every 3 to 6 hours for the first 24 to 48 hours. To make an ice pack, put ice cubes in a plastic bag that seals at the top. Wrap the bag in a clean, thin towel or cloth. Never put ice or an ice pack directly on the skin. Keep using ice packs to ease pain and swelling as needed. After 48 hours, apply heat (warm shower or warm bath) for 20 minutes several times a day, or switch between ice and heat.   · You may use over-the-counter pain medicine to control pain, unless another pain medicine was prescribed. If you have chronic liver or kidney disease or ever had a stomach ulcer or GI bleeding, talk with your provider before using these medicines.  Follow-up care  Follow up with your healthcare provider, or as directed.  If X-rays, a CT scan or an MRI were done, you will be notified of any new findings that may affect your care.  When to seek medical advice  Contact your healthcare provider right away if any of these occur:  · Increasing back pain  · Your foot drags when you walk, a condition called foot drop  · You have new weakness, numbness, or pain in one or both arms or legs  · Loss of bowel or bladder control  · Numbness or tingling in the buttock or groin area  Date Last Reviewed: 11/23/2015  © 2207-9301 [employer]. [address] [place] [address]. All rights reserved. This information is not intended as a substitute for professional medical care. Always follow your healthcare professional's instructions.        Raynaud Disease  Your healthcare provider has told you that you have Raynaud disease. It is also called Raynaud phenomenon or Raynaud syndrome. There is no cure for Raynaud disease, but you can manage it to help prevent attacks.    What are the symptoms of Raynaud disease?  A Raynaud disease attack is often triggered by cold or stress. During an attack,  blood vessels suddenly narrow (called vasospasm).  This most often happens in fingers and toes. In rare cases, the nose, ears, or even tongue are affected. Narrowed blood vessels reduce the blood supply to the area. The area then turns white, then blue. The area may feel numb or painful. As the attack passes, the blood vessels open. The affected area may turn bright red as it warms up, then returns to normal color.  What is the cause of Raynaud disease?  With Raynaud disease, it is believed that blood vessels in the affected areas overrespond to certain triggers, such as cold. This makes them narrow (called vasospasm) much more than in people without the disease. What causes the blood vessels to react so strongly to certain triggers is unknown. In between attacks, the blood vessels are normal and healthy. Attacks dont permanently damage the blood vessels, but may thicken the artery walls.   In some cases, Raynaud disease happens along with another disease or condition. This is often a connective tissue disorder, such as lupus, scleroderma, or rheumatoid arthritis. This is called secondary Raynaud disease (as opposed to primary Raynaud disease discussed above) and may be more severe. If this is the case for you, you and your healthcare provider can discuss treatment for the underlying condition.  What are the risk factors?  Risk factors for Raynaud disease include:  · Women are more likely to get Raynaud disease than men.  · Younger individuals are at higher risk, usually ages 15 to 30.  · Living in colder climates increases risk.  · Having a family member with Raynaud disease increases one's risk.  · Underlying rheumatoid conditions may increase one's risk.   What are possible triggers?  Triggers for Raynaud disease include:   · Cold  · Stress  · Caffeine  · Smoking  · Repetitive movements  · Certain medicines, such as beta-blockers, migraine medicine, birth control pills and others  · Injury  How is Raynaud  disease diagnosed?  Your description of your symptoms, a health history, and a physical exam are often enough for a diagnosis. Blood tests and other tests may be done to see if any underlying conditions are present and rule out other problems.  How is Raynaud disease treated?  There is no cure for Raynaud disease. But you can control symptoms and reduce the number and severity of attacks. For most people, avoiding triggers is enough to limit attacks. Your healthcare provider may suggest the following:  · Take precautions to help prevent your hands and feet from losing circulation. This includes:  ¨ Dressing warmly in cold weather.  ¨ Wear gloves or mittens when your hands may become cold, such as when you use the refrigerator or freezer.  ¨ Avoid stress and caffeine.  ¨ Exercise regularly. This may reduce the number and severity of attacks.   ¨ If you smoke, quitting may improve the condition. This is because smoking causes your blood vessels to narrow and reduces blood flow.  · Soak your hands or feet in warm (not hot) water. Do this at the first sign of attack. Keep soaking until your skin color returns to normal.  In some people, symptoms are persistent or troubling. For these cases, other treatments are a choice. Your healthcare provider can tell you more about the following:  · Prescription medicines that relax and widen blood vessels, such as calcium channel blockers. These may help relieve symptoms.  · Nerve surgery for severe cases that dont respond to other treatments. Surgery removes the nerves that surround the blood vessels in the hands and feet. Without nerve stimulation, the blood vessels stay more relaxed. They are less likely to become very narrow due to stimulus. Nerves may be blocked using injections in some cases.  Most cases of Raynaud disease are not cause for concern. The disease doesnt get worse and isnt likely to cause any permanent damage. If attacks are severe, very prolonged, or very  often, skin damage may result. Controlling attacks can help prevent this.  When to seek medical care  The following problems happen rarely, but they can be serious. Call your healthcare provider right away if you notice any of the following:  · Infection or sores on the skin  · A finger or toe turns black  · The skin breaks open on its own  · A rash develops  · A finger or toe joint becomes painful or swollen   Date Last Reviewed: 1/27/2016  © 6296-1658 [employer]. [address] [place] [address], [place] [address]. All rights reserved. This information is not intended as a substitute for professional medical care. Always follow your healthcare professional's instructions.        Understanding Trochanteric Bursitis    A bursa is a thin, slippery, sac-like film. It contains a small amount of fluid. This structure is found between bones and soft tissues in and around joints. A bursa cushions and protects a joint. It keeps parts of a joint from rubbing against each other. If a bursa becomes inflamed and irritated, it is known as bursitis.  The trochanteric bursa is found on the hip joint. It lies on top of the bump at the top of the thighbone called the greater trochanter. Inflammation of this bursa is called trochanteric bursitis.     How to say it  [id number]   Causes of trochanteric bursitis  Causes may include:  · Overuse of the hip during running or other sports, dance, or work  · Falling on or irritation to the side of the hip  This condition may occur along with other problems, such as osteoarthritis of the hip or knee, or low back problems. In rare cases, it may occur after hip surgery.  Symptoms of trochanteric bursitis  · Pain or aching on the side of the hip. The pain may travel down the leg.  · Swelling, tenderness, or warmth on the side of the hip at the bony bump at the top of the thigh  Treatment for trochanteric bursitis  These may include:  · Resting the hip. This allows the bursa to  heal.  · Prescription or over-the-counter pain medicines. These help reduce inflammation, swelling, and pain.  · Cold packs and heat packs. These help reduce pain and swelling.  · Stretching and strengthening exercises. These improve flexibility and strength around the hip.  · Physical therapy. This includes exercises or other treatments.  · Injections of medicine into the bursa. This may help reduce inflammation and relieve symptoms.  Possible complications  If you dont give your hip time to heal, the problem may not go away, may return, or may get worse. Rest and treat your hip as directed.     When to call your healthcare provider  Call your healthcare provider right away if you have any of these:  · Fever of 100.4°F (38°C) or higher, or as directed  · Redness, swelling, or warmth that gets worse  · Symptoms that dont get better with prescribed medicines, or get worse  · New symptoms   Date Last Reviewed: 3/29/2016  © 4268-4278 [employer]. [address] [place] [address], [place] [address]. All rights reserved. This information is not intended as a substitute for professional medical care. Always follow your healthcare professional's instructions.

## 2018-06-06 NOTE — PROGRESS NOTES
Subjective:      Patient ID: Rajni Melchor is a 68 y.o. female.    Chief Complaint: Back Pain ( blue toe)    Disclaimer:  This note is prepared using voice recognition software and as such is likely to have errors and has not been proof read. Please contact me for questions.     Rajni Melchor is a 68 y.o. female who presents today for urgent visit. The patient's PCP is Kylah Benedict MD.  She reported to me that she had been noticing not only more back pain and left leg discomfort but also that her toe at times was turning bluish purple.  Upon further discussion it only happens periodically.  It does have some associations with cold temperatures and hot temperatures.  It does not persist.  There has been no swelling.  It had a concerned because her  does have a history of peripheral vascular disease and so she was concerned.  She herself has a history of some degenerative disc changes as well as foraminal stenosis noted on MRI of the lumbar spine in particular at the L5 to L3 and L4-L5 region.  She reports that she did receive 1 JESUSITA injection but just did not see much benefit.  She has seen Pain Management.  They recommended that if she did not improve with the injection the potential possibly doing a greater trochanter injection as well as an SI joint injection.  Her pain is worse with sitting and softer chairs.  It hurts to get up at times.  Does radiate more into the lateral aspect of her leg and over SI joint area as well.  She never did follow through with repeat physical therapy however physical therapy in the past has helped.  She does not want to go on gabapentin.  We reviewed extensively the pathophysiology of her current condition with regards to her lumbar MRI as well as using pictures from up-to-date.  We also described and focused on dermatomal patterns as well.  At this time she would prefer to try to do physical therapy again before proceeding forward with doing possible injections  "however she would like to have another follow-up appointment with the interventionalist if she does not improve with physical therapy after 4 weeks.    We also discussed the kinds of symptoms associated with Raynaud syndrome.  She is currently on thyroid medication.  In the setting of this with also her recently taking more vitamins that have some green tea extract in them as well the green tea extract can act as a stimulant and the skin cause more Raynaud phenomenon going on.  I believe this is possibly some the etiologies for her discoloration of her foot and toe.  There is no active swelling noted of the calf or the foot.  She does have good pulses        Lab Results   Component Value Date    WBC 5.34 03/27/2018    HGB 13.2 03/27/2018    HCT 41.1 03/27/2018     03/27/2018    CHOL 186 03/27/2018    TRIG 48 03/27/2018    HDL 88 (H) 03/27/2018    ALT 15 03/27/2018    AST 19 03/27/2018     03/27/2018    K 4.7 03/27/2018     03/27/2018    CREATININE 0.9 03/27/2018    BUN 14 03/27/2018    CO2 29 03/27/2018    TSH 2.692 03/27/2018    HGBA1C 5.5 01/23/2013       Review of Systems   Constitutional: Positive for activity change. Negative for appetite change and fatigue.   Endocrine: Positive for cold intolerance. Negative for heat intolerance.   Musculoskeletal: Positive for arthralgias, back pain and myalgias. Negative for neck pain and neck stiffness.   Skin: Positive for color change. Negative for wound.   Neurological: Negative for weakness and numbness.     Objective:     Vitals:    06/06/18 0934   BP: 124/80   Pulse: 66   Temp: 97 °F (36.1 °C)   TempSrc: Tympanic   Weight: 76.4 kg (168 lb 6.9 oz)   Height: 4' 11.5" (1.511 m)     Physical Exam   Constitutional: She appears well-developed and well-nourished.   Cardiovascular:   Pulses:       Dorsalis pedis pulses are 2+ on the left side.   Musculoskeletal:        Left hip: She exhibits tenderness.        Lumbar back: She exhibits tenderness, bony " tenderness and pain. She exhibits normal range of motion and no spasm.        Back:         Legs:       Left foot: There is normal range of motion, no tenderness, no bony tenderness, no swelling, no deformity and no laceration.   Feet:   Left Foot:   Skin Integrity: Positive for warmth. Negative for ulcer, blister, skin breakdown, erythema, callus or dry skin.   Vitals reviewed.    Assessment:     1. Bulging of lumbar intervertebral disc    2. Facet joint disease of lumbosacral region    3. Raynaud's phenomenon without gangrene    4. Hypothyroidism, unspecified type      Plan:   Rajni was seen today for back pain.    Diagnoses and all orders for this visit:    Bulging of lumbar intervertebral disc-reviewed findings current symptomatology and using up-to-date with imaging of dermatomal patterns and pathophysiology suspect her condition of her discomfort with radiculopathy symptoms is more the L4-L5 distribution at this time.  We discussed returning back to physical therapy as recommended.  She is willing to do so.  If no improvement in 4-5 weeks would recommend she return back to intervention for possible SI joint and greater trochanter bursa injection as well.  She was amenable to this plan.  -     Ambulatory Referral to Physical/Occupational Therapy    Facet joint disease of lumbosacral region -reviewed findings current symptomatology and using up-to-date with imaging of dermatomal patterns and pathophysiology suspect her condition of her discomfort with radiculopathy symptoms is more the L4-L5 distribution at this time.  We discussed returning back to physical therapy as recommended.  She is willing to do so.  If no improvement in 4-5 weeks would recommend she return back to intervention for possible SI joint and greater trochanter bursa injection as well.  She was amenable to this plan.    -     Ambulatory Referral to Physical/Occupational Therapy    Raynaud's phenomenon without gangrene-suspected due to the  discoloration no evidence of swelling no evidence of decreased pulse.  In the setting that she is also on thyroid supplementation and recently taking morbid stimulant with the green tea extract and her vitamins a believe this is can conditions for her symptoms.  Advised her to wear covered toe shoes monitor for any symptoms or worsening signs or symptoms occurring not in cold temperatures.  She is amenable to this plan.    Hypothyroidism, unspecified type-on thyroid supplement can contribute to Raynaud's phenomenon        Time spent: 25 minutes in face to face discussion concerning diagnosis, prognosis, review of lab and test results, benefits of treatment as well as management of disease, counseling of patient and coordination of care between various health care providers . Greater than half the time spent was used for coordination of care and counseling of patient.       Follow-up if symptoms worsen or fail to improve.

## 2018-07-22 ENCOUNTER — PATIENT MESSAGE (OUTPATIENT)
Dept: INTERNAL MEDICINE | Facility: CLINIC | Age: 68
End: 2018-07-22

## 2018-07-23 DIAGNOSIS — J30.9 ALLERGIC RHINITIS: ICD-10-CM

## 2018-07-23 RX ORDER — FLUTICASONE PROPIONATE 50 MCG
SPRAY, SUSPENSION (ML) NASAL
Qty: 16 G | Refills: 3 | Status: SHIPPED | OUTPATIENT
Start: 2018-07-23 | End: 2018-10-01 | Stop reason: SDUPTHER

## 2018-08-27 ENCOUNTER — OFFICE VISIT (OUTPATIENT)
Dept: PAIN MEDICINE | Facility: CLINIC | Age: 68
End: 2018-08-27
Payer: MEDICARE

## 2018-08-27 VITALS
DIASTOLIC BLOOD PRESSURE: 83 MMHG | HEART RATE: 66 BPM | WEIGHT: 168.44 LBS | SYSTOLIC BLOOD PRESSURE: 136 MMHG | BODY MASS INDEX: 33.07 KG/M2 | HEIGHT: 60 IN

## 2018-08-27 DIAGNOSIS — M47.816 LUMBAR SPONDYLOSIS: Primary | ICD-10-CM

## 2018-08-27 DIAGNOSIS — M70.62 TROCHANTERIC BURSITIS OF LEFT HIP: ICD-10-CM

## 2018-08-27 DIAGNOSIS — M53.3 SACROILIAC JOINT PAIN: ICD-10-CM

## 2018-08-27 PROCEDURE — 99999 PR PBB SHADOW E&M-EST. PATIENT-LVL III: CPT | Mod: PBBFAC,,, | Performed by: ANESTHESIOLOGY

## 2018-08-27 PROCEDURE — 99213 OFFICE O/P EST LOW 20 MIN: CPT | Mod: PBBFAC,PO | Performed by: ANESTHESIOLOGY

## 2018-08-27 PROCEDURE — 99214 OFFICE O/P EST MOD 30 MIN: CPT | Mod: S$PBB,,, | Performed by: ANESTHESIOLOGY

## 2018-08-27 NOTE — PROGRESS NOTES
Chief Pain Complaint:  Back Pain  Left Buttock  Left Lateral Thigh Pain       History of Present Illness:   Rajni Melchor is a 68 y.o. female  who is presenting with a chief complaint of Left Buttock. The patient began experiencing this problem insidiously, and the pain has been gradually worsening over the past 12 month(s). The pain is described as throbbing, cramping, aching and heavy and is located in the left buttock . Pain is intermittent and lasts hours. The  pain is nonradiating. The patient rates her pain a 7 out of ten and interferes with activities of daily living a 6 out of ten. Pain is exacerbated by getting up from a seated position, and is improved by rest. Patient reports no prior trauma, no prior spinal surgery     - pertinent negatives: No fever, No chills, No weight loss, No bladder dysfunction, No bowel dysfunction, No saddle anesthesia  - pertinent positives: none    - medications, other therapies tried (physical therapy, injections):     >> NSAIDs, Tylenol, Tramadol, gabapentin and flexeril    >> Has previously undergone Physical Therapy    >> Has previously undergone spinal injection/s   Left L5-S1 TFESI on 1/26/18 with minimal relief     Imaging / Labs / Studies (reviewed on 8/27/2018):    Results for orders placed during the hospital encounter of 01/05/18   MRI Lumbar Spine Without Contrast    Narrative MRI LUMBAR SPINE    TECHNIQUE: MRI lumbar spine was performed without contrast on a 1.5T magnet. The following sequences were obtained: Localizer; sagittal T1, T2, STIR; axial T1 and T2.    COMPARISON: Not available.    FINDINGS:    There are 5 lumbar vertebrae.  Vertebral body heights and alignment are maintained.  There is moderate to severe disc height loss at L5-S1 with associated degenerative endplate changes.  Disc desiccation and mild disc height loss noted from L2-L3 through L4-L5.  Multilevel Schmorl's nodes are present in the lower thoracic and upper lumbar spine.  Small  hemangioma noted within the L1 vertebral body.  No evidence of fracture or marrow replacement process.     Conus terminates at L1 and appears unremarkable. Limited evaluation of posterior abdominal structures is unremarkable.  Paraspinal musculature is within normal limits.      L1-L2: No spinal canal stenosis or neuroforaminal narrowing.    L2-L3: Small left paracentral disc protrusion superimposed upon more generalized disc bulge.Mild bilateral neural foraminal narrowing.  No spinal canal stenosis.    L3-L4: Mild generalized disc bulge.Mild bilateral neuroforaminal narrowing.  No spinal canal stenosis.    L4-L5: Generalized disc bulge and mild bilateral facet arthropathy.Mild bilateral neural foraminal narrowing.  No spinal canal stenosis.    L5-S1: Mild generalized disc bulge and mild bilateral facet arthropathy.Moderate bilateral neuroforaminal narrowing.  No spinal canal stenosis.    Impression    Multilevel neuroforaminal narrowing secondary to degenerative disc disease and facet arthropathy as above, most severe at L5-S1.  No spinal canal stenosis.      Electronically signed by: IDALIA GONZALEZ MD  Date:     01/05/18  Time:    10:18        Results for orders placed during the hospital encounter of 07/26/17   X-Ray Lumbar Spine AP And Lateral    Narrative Comparison: Chest x-ray dated 03/12/2014    Technique: AP and lateral views were obtained of the lumbar spine    Findings: Slight anterior wedge deformity of the T12 vertebral body appears unchanged similar to chest x-ray.  There is slight dextroscoliotic curvature of the visualized thoracic spine.  Lumbar vertebral body heights and alignment are otherwise within normal limits. There is mild diffuse disc height loss throughout the lumbar spine with moderate disc height loss at L5-S1.  Bilateral facet arthropathy suspected at L5-S1. Posterior elements appear grossly intact. No acute fractures or subluxations are demonstrated.  The remaining visualized osseous  "and soft tissue structures demonstrate no appreciable abnormality.    Impression Degenerative changes as above.  No acute findings.            Electronically signed by: IDALIA GONZALEZ MD  Date:     07/26/17  Time:    10:44        Review of Systems:  CONSTITUTIONAL: patient denies any fever, chills, or weight loss  SKIN: patient denies any rash or itching  RESPIRATORY: patient denies having any shortness of breath  GASTROINTESTINAL: patient denies having any diarrhea, constipation, or bowel incontinence  GENITOURINARY: patient denies having any abnormal bladder function    MUSCULOSKELETAL:  - patient complains of the above noted pain/s (see chief pain complaint)    NEUROLOGICAL:   - pain as above  - strength in Lower extremities is intact, BILATERALLY  - sensation in Lower extremities is intact, BILATERALLY  - patient denies any loss of bowel or bladder control      PSYCHIATRIC: patient denies any change in mood    Other:  All other systems reviewed and are negative      Physical Exam:  /83 (BP Location: Right arm, Patient Position: Sitting)   Pulse 66   Ht 4' 11.5" (1.511 m)   Wt 76.4 kg (168 lb 6.9 oz)   BMI 33.45 kg/m²  (reviewed on 8/27/2018)  General: Alert and oriented, in no apparent distress.  Gait: normal gait.  Skin: No rashes, No discoloration, No obvious lesions  HEENT: Normocephalic, atraumatic. Pupils equal and round.  Cardiovascular: Regular rate and rhythm , no significant peripheral edema present  Respiratory: Without audible wheezing, without use of accessory muscles of respiration.    Musculoskeletal:    Lumbar Spine    - Pain on flexion of lumbar spine Absent  - Straight Leg Raise:  Absent    - Pain on extension of lumbar spine Absent  - TTP over the lumbar facet joints Absent  - Lumbar facet loading Absent    -Pain on palpation over the SI joint  Present on Left   - YEYO: Present  - TTP over left GTB    Neuro:    Strength:  LE R/L: HF: 5/5, HE: 5/5, KF: 5/5; KE: 5/5; FE: 5/5; FF: " 5/5    Extremity Reflexes: Brisk and symmetric throughout.      Extremity Sensory: Sensation to pinprick and temperature symmetric. Proprioception intact.      Psych:  Mood and affect is appropriate      Assessment:    Rajni Melchor is a 68 y.o. year old female who is presenting with     Encounter Diagnoses   Name Primary?    Lumbar spondylosis Yes    Sacroiliac joint pain     Trochanteric bursitis of left hip      Plan:    1. Interventional: Schedule patient for Left SI Joint Left GTB injection.    2. Pharmacologic: Tylenol, NSAID's PRN.     3. Rehabilitative: Patient already tried PT with modest results.     4. Diagnostic: None for now.    5. Follow up: Follow-up for After Injection.      20 minutes were spent in this encounter with more than 50% of the time used for counseling and review of the plan.  Imaging / studies reviewed, detailed above.  I discussed in detail the risks, benefits, and alternatives to any and all potential treatment options.  All questions and concerns were fully addressed today in clinic. Medical decision making moderate.    Thank you for the opportunity to assist in the care of this patient.    Best wishes,    Signed:    Yuri Coppola MD

## 2018-09-07 ENCOUNTER — TELEPHONE (OUTPATIENT)
Dept: INTERNAL MEDICINE | Facility: CLINIC | Age: 68
End: 2018-09-07

## 2018-09-07 NOTE — TELEPHONE ENCOUNTER
----- Message from Kamari Handy sent at 9/7/2018  4:25 PM CDT -----  Contact: pt   Pt is inq about being worked in for appt 9/10 for e/[employer].         ..[phone] (home)

## 2018-09-10 ENCOUNTER — OFFICE VISIT (OUTPATIENT)
Dept: INTERNAL MEDICINE | Facility: CLINIC | Age: 68
End: 2018-09-10
Payer: MEDICARE

## 2018-09-10 ENCOUNTER — HOSPITAL ENCOUNTER (OUTPATIENT)
Dept: RADIOLOGY | Facility: HOSPITAL | Age: 68
Discharge: HOME | End: 2018-09-10
Attending: FAMILY MEDICINE
Payer: MEDICARE

## 2018-09-10 VITALS
HEIGHT: 60 IN | WEIGHT: 167 LBS | BODY MASS INDEX: 32.79 KG/M2 | HEART RATE: 80 BPM | DIASTOLIC BLOOD PRESSURE: 80 MMHG | TEMPERATURE: 98 F | SYSTOLIC BLOOD PRESSURE: 120 MMHG

## 2018-09-10 DIAGNOSIS — M47.24 THORACIC RADICULOPATHY DUE TO DEGENERATIVE JOINT DISEASE OF SPINE: ICD-10-CM

## 2018-09-10 DIAGNOSIS — R10.2 SUPRAPUBIC PAIN: ICD-10-CM

## 2018-09-10 DIAGNOSIS — M54.50 LOW BACK PAIN, NON-SPECIFIC: ICD-10-CM

## 2018-09-10 DIAGNOSIS — R10.2 SUPRAPUBIC PAIN, ACUTE: ICD-10-CM

## 2018-09-10 DIAGNOSIS — R10.2 SUPRAPUBIC PAIN, ACUTE: Primary | ICD-10-CM

## 2018-09-10 LAB
B-HCG UR QL: NEGATIVE
CTP QC/QA: YES

## 2018-09-10 PROCEDURE — 72100 X-RAY EXAM L-S SPINE 2/3 VWS: CPT | Mod: 26,,, | Performed by: RADIOLOGY

## 2018-09-10 PROCEDURE — 99215 OFFICE O/P EST HI 40 MIN: CPT | Mod: S$PBB,,, | Performed by: FAMILY MEDICINE

## 2018-09-10 PROCEDURE — 87086 URINE CULTURE/COLONY COUNT: CPT

## 2018-09-10 PROCEDURE — 72070 X-RAY EXAM THORAC SPINE 2VWS: CPT | Mod: 26,,, | Performed by: RADIOLOGY

## 2018-09-10 PROCEDURE — 73521 X-RAY EXAM HIPS BI 2 VIEWS: CPT | Mod: 26,,, | Performed by: RADIOLOGY

## 2018-09-10 PROCEDURE — 99214 OFFICE O/P EST MOD 30 MIN: CPT | Mod: PBBFAC,25,PO | Performed by: FAMILY MEDICINE

## 2018-09-10 PROCEDURE — 73521 X-RAY EXAM HIPS BI 2 VIEWS: CPT | Mod: TC,FY,PO

## 2018-09-10 PROCEDURE — 81025 URINE PREGNANCY TEST: CPT | Mod: PBBFAC,PO | Performed by: FAMILY MEDICINE

## 2018-09-10 PROCEDURE — 72100 X-RAY EXAM L-S SPINE 2/3 VWS: CPT | Mod: TC,FY,PO

## 2018-09-10 PROCEDURE — 72070 X-RAY EXAM THORAC SPINE 2VWS: CPT | Mod: TC,FY,PO

## 2018-09-10 PROCEDURE — 99999 PR PBB SHADOW E&M-EST. PATIENT-LVL IV: CPT | Mod: PBBFAC,,, | Performed by: FAMILY MEDICINE

## 2018-09-10 RX ORDER — CYCLOBENZAPRINE HCL 10 MG
5 TABLET ORAL
COMMUNITY
Start: 2018-09-07 | End: 2018-09-12

## 2018-09-10 RX ORDER — METHYLPREDNISOLONE 4 MG/1
TABLET ORAL
Qty: 1 PACKAGE | Refills: 0 | Status: SHIPPED | OUTPATIENT
Start: 2018-09-10 | End: 2018-10-01

## 2018-09-10 RX ORDER — BACLOFEN 10 MG/1
10 TABLET ORAL 3 TIMES DAILY
Qty: 90 TABLET | Refills: 11 | Status: SHIPPED | OUTPATIENT
Start: 2018-09-10 | End: 2018-10-01

## 2018-09-10 NOTE — PROGRESS NOTES
Subjective:      Patient ID: Rajni Melchor is a 68 y.o. female.    Chief Complaint: Hospital Follow Up (abdominal pain hurt to walk)    Disclaimer:  This note is prepared using voice recognition software and as such is likely to have errors and has not been proof read. Please contact me for questions.     Rajni Melchor is a 68 y.o. female who presents today for urgent visit. The patient's PCP is Kylah Benedict MD.  She reports Thursday she started a part-time job where she had to stand for a prolonged period of time.  She is really fatigued when she got home so she laid down in the bed.  That evening when she got up from the couch she had sudden severe suprapubic pain.  So severe that she ended up going to the emergency room.  Recently seen at [facility] for suprapubic pain on .  With diagnosis of muscle strain.  Ended up having a CT scan of the abdomen and pelvis as well as urinalysis and blood work performed.  No  acute issues determined.  She is here today for follow-up.couldn't walk suddenly on Thursday night.  Sitting down she still feels the discomfort laying down she feels it is slightly more but the worst is with walking.  Standing up and to do steps causes the most severe intense pain that debilitated her.  She is in a wheelchair today.  Her 's with her as well as she can't drive.  She did take the Flexeril from the emergency room but they also gave her some Tylenol with codeine that she does not like to take.  She did take some ibuprofen.  She is scheduled to have an epidural steroid injection of the SI joints in October through our interventionalist.  She has known issues in the past with lumbar degenerative disc disease as well as some disc herniations.  She denies any trauma.  At time she can feel the pain deep in her pelvic area.  She does still have her uterus and ovaries.  There has been no vaginal bleeding.  Her father  due to breast bladder cancer and so  she is very concerned about the potential for this however her urinalysis then and today does not show blood.  She has no burning with urination.  No vaginal bleeding at this time.  Willing to do additional x-rays.  Below is a copy of her CT scan that was done at [facility].    EXAM:  CT ABDOMEN PELVIS W IV CONTRAST    CLINICAL INDICATION:  Suprapubic abdominal pain    TECHNIQUE:   Postcontrast CT scan performed through the abdomen and pelvis.    FINDINGS:     The lung bases are clear. The liver is normal.  The gallbladder appears normal.  The spleen is normal.  No pancreatic abnormality is identified. No adrenal masses. Calcification left adrenal gland may be related to prior trauma or infection. The kidneys are unremarkable. The aorta is nondilated. No soft tissue masses, adenopathy or free fluid identified. No inflammatory change. No bowel abnormality is appreciated. The bladder is unremarkable. No acute osseous abnormalities are identified.        Lab Results   Component Value Date    WBC 5.34 03/27/2018    HGB 13.2 03/27/2018    HCT 41.1 03/27/2018     03/27/2018    CHOL 186 03/27/2018    TRIG 48 03/27/2018    HDL 88 (H) 03/27/2018    ALT 15 03/27/2018    AST 19 03/27/2018     03/27/2018    K 4.7 03/27/2018     03/27/2018    CREATININE 0.9 03/27/2018    BUN 14 03/27/2018    CO2 29 03/27/2018    TSH 2.692 03/27/2018    HGBA1C 5.5 01/23/2013       Review of Systems   Constitutional: Positive for activity change, appetite change and fatigue. Negative for unexpected weight change.   Respiratory: Negative for cough and shortness of breath.    Cardiovascular: Negative for chest pain and palpitations.   Gastrointestinal: Positive for abdominal pain. Negative for abdominal distention, blood in stool, constipation, diarrhea, nausea, rectal pain and vomiting.   Genitourinary: Positive for pelvic pain. Negative for difficulty urinating, dysuria, flank pain, frequency, genital sores, urgency,  "vaginal bleeding, vaginal discharge and vaginal pain.   Musculoskeletal: Positive for arthralgias, back pain, gait problem and myalgias.   Neurological: Negative for weakness and numbness.   Psychiatric/Behavioral: Positive for sleep disturbance.     Objective:     Vitals:    09/10/18 1005   BP: 120/80   Pulse: 80   Temp: 98 °F (36.7 °C)   Weight: 75.8 kg (167 lb)   Height: 4' 11.5" (1.511 m)     Physical Exam   Constitutional: Vital signs are normal. She appears well-developed and well-nourished. She appears distressed.   HENT:   Head: Normocephalic and atraumatic.   Mouth/Throat: Oropharynx is clear and moist.   Eyes: Conjunctivae are normal.   Neck: Normal range of motion. Neck supple.   Abdominal: Soft. Normal appearance and bowel sounds are normal. There is tenderness in the suprapubic area. There is guarding. There is no rigidity, no rebound and no CVA tenderness. No hernia.   Genitourinary: Pelvic exam was performed with patient supine. There is no rash or tenderness on the right labia. There is no rash or tenderness on the left labia. Cervix exhibits no motion tenderness, no discharge and no friability. Right adnexum displays no mass, no tenderness and no fullness. Left adnexum displays no mass and no tenderness. No tenderness in the vagina. No signs of injury around the vagina. No vaginal discharge found.   Genitourinary Comments: No pain with bimanual exam. No fibroids noted. No blood in vaginal vault.    Musculoskeletal:        Thoracic back: She exhibits tenderness, bony tenderness and pain. She exhibits normal range of motion and no spasm.        Lumbar back: She exhibits decreased range of motion, tenderness, bony tenderness and pain.   Neurological: She is alert. She has normal strength. She displays no atrophy. No sensory deficit. Gait abnormal. Coordination normal.   Skin: Skin is warm and dry. No rash noted.   Psychiatric: She has a normal mood and affect. Her speech is normal.     Assessment: "     1. Suprapubic pain, acute    2. Low back pain, non-specific    3. Thoracic radiculopathy due to degenerative joint disease of spine      Plan:   Rajni was seen today for hospital follow up.    Diagnoses and all orders for this visit:  -  Suprapubic pain, acute-reviewed findings from [facility] emergency room including lab work, urinalysis, and CT scan.  Urinalysis performed in the office today shows +30 protein negative nitrates negative leukocytes no blood present no ketones present.  Vaginal exam performed today and did not reproduce pelvic pain. No abnormalities identified on the vaginal exam today.  At this time x-rays were performed as well to evaluate for the hips the thoracic spine as well.  Rule out any compression fractures or bony abnormalities that could also present with radicular suprapubic pain.    Hip joints appear symmetric.  No fracture or dislocation is seen.  Sacroiliac joints are intact.  Soft tissues appear normal       FINDINGS:  Multilevel discogenic degenerative changes lumbar spine are again seen.  Findings appear similar to slightly progressed since 07/26/2017.  There is marked intervertebral disc space narrowing at L5-S1.  Multilevel spurring of the vertebral body endplates and facet arthropathy is seen.  There is possible minimal increase in depression of the superior endplate of the T12 vertebral body since the comparison exam.  Please correlate with point tenderness to determine acuity.  Dedicated thoracic spine radiographs can be obtained versus correlation with cross-sectional imaging as indicated.  Sacroiliac joints are intact.         -     POCT urine pregnancy  -     Urine culture; Future  -     X-Ray Thoracic Spine AP Lateral; Future  -     X-Ray Hips Bilateral [employer] AP Pelvis; Future  -     Urine culture  -     POCT URINE DIPSTICK WITHOUT MICROSCOPE    Low back pain, non-specific- at this time her x-rays are more for specific with the T12 area .  I believe this  is the source will get her to continue with the ibuprofen as well as a muscle relaxant.  Would advise her to follow up with interventional pain management to evaluate this area further since her areas correlate with the T12-L1 region.  -     POCT URINE DIPSTICK WITHOUT MICROSCOPE  -     Cancel: X-Ray Lumbar Complete With Flex And Ext; Future    Thoracic radiculopathy due to degenerative joint disease of spine-x-rays obtained as noted above.  Starting her on Medrol Dosepak as well as baclofen to use.  Will refer to interventional pain management to see if there is any etiology or possible other treatment options to do since the T12 area now corresponds more with her suprapubic pain.    Other orders  -     methylPREDNISolone (MEDROL DOSEPACK) 4 mg tablet; use as directed  -     baclofen (LIORESAL) 10 MG tablet; Take 1 tablet (10 mg total) by mouth 3 (three) times daily.            Follow-up in about 3 weeks (around 10/1/2018) for back pain/suprapubic pain.          Time spent: 45 minutes in face to face discussion concerning diagnosis, prognosis, review of lab and test results, benefits of treatment as well as management of disease, counseling of patient and coordination of care between various health care providers . Greater than half the time spent was used for coordination of care and counseling of patient.

## 2018-09-11 LAB — BACTERIA UR CULT: NO GROWTH

## 2018-09-28 ENCOUNTER — LAB VISIT (OUTPATIENT)
Dept: LAB | Facility: HOSPITAL | Age: 68
End: 2018-09-28
Attending: PHYSICIAN ASSISTANT
Payer: MEDICARE

## 2018-09-28 DIAGNOSIS — M81.0 AGE-RELATED OSTEOPOROSIS WITHOUT CURRENT PATHOLOGICAL FRACTURE: ICD-10-CM

## 2018-09-28 LAB
ALBUMIN SERPL BCP-MCNC: 3.7 G/DL
ALP SERPL-CCNC: 58 U/L
ALT SERPL W/O P-5'-P-CCNC: 20 U/L
ANION GAP SERPL CALC-SCNC: 6 MMOL/L
AST SERPL-CCNC: 21 U/L
BILIRUB SERPL-MCNC: 0.6 MG/DL
BUN SERPL-MCNC: 10 MG/DL
CALCIUM SERPL-MCNC: 9.7 MG/DL
CHLORIDE SERPL-SCNC: 106 MMOL/L
CO2 SERPL-SCNC: 27 MMOL/L
CREAT SERPL-MCNC: 0.9 MG/DL
EST. GFR  (AFRICAN AMERICAN): >60 ML/MIN/1.73 M^2
EST. GFR  (NON AFRICAN AMERICAN): >60 ML/MIN/1.73 M^2
GLUCOSE SERPL-MCNC: 99 MG/DL
POTASSIUM SERPL-SCNC: 4.4 MMOL/L
PROT SERPL-MCNC: 6.6 G/DL
SODIUM SERPL-SCNC: 139 MMOL/L

## 2018-09-28 PROCEDURE — 36415 COLL VENOUS BLD VENIPUNCTURE: CPT | Mod: PO

## 2018-09-28 PROCEDURE — 80053 COMPREHEN METABOLIC PANEL: CPT

## 2018-10-01 ENCOUNTER — OFFICE VISIT (OUTPATIENT)
Dept: INTERNAL MEDICINE | Facility: CLINIC | Age: 68
End: 2018-10-01
Payer: MEDICARE

## 2018-10-01 ENCOUNTER — OFFICE VISIT (OUTPATIENT)
Dept: RHEUMATOLOGY | Facility: CLINIC | Age: 68
End: 2018-10-01
Payer: MEDICARE

## 2018-10-01 VITALS
BODY MASS INDEX: 33.69 KG/M2 | HEART RATE: 74 BPM | HEIGHT: 59 IN | SYSTOLIC BLOOD PRESSURE: 118 MMHG | DIASTOLIC BLOOD PRESSURE: 72 MMHG | TEMPERATURE: 97 F | WEIGHT: 167.13 LBS

## 2018-10-01 VITALS
SYSTOLIC BLOOD PRESSURE: 120 MMHG | HEART RATE: 67 BPM | WEIGHT: 168.44 LBS | DIASTOLIC BLOOD PRESSURE: 76 MMHG | BODY MASS INDEX: 33.96 KG/M2 | HEIGHT: 59 IN

## 2018-10-01 DIAGNOSIS — M54.14 THORACIC RADICULOPATHY: Primary | ICD-10-CM

## 2018-10-01 DIAGNOSIS — Z12.31 ENCOUNTER FOR SCREENING MAMMOGRAM FOR BREAST CANCER: ICD-10-CM

## 2018-10-01 DIAGNOSIS — E03.9 HYPOTHYROIDISM, UNSPECIFIED TYPE: ICD-10-CM

## 2018-10-01 DIAGNOSIS — M81.0 AGE-RELATED OSTEOPOROSIS WITHOUT CURRENT PATHOLOGICAL FRACTURE: Primary | ICD-10-CM

## 2018-10-01 DIAGNOSIS — E66.9 CLASS 1 OBESITY WITHOUT SERIOUS COMORBIDITY WITH BODY MASS INDEX (BMI) OF 33.0 TO 33.9 IN ADULT, UNSPECIFIED OBESITY TYPE: ICD-10-CM

## 2018-10-01 DIAGNOSIS — M81.0 AGE-RELATED OSTEOPOROSIS WITHOUT CURRENT PATHOLOGICAL FRACTURE: ICD-10-CM

## 2018-10-01 DIAGNOSIS — M51.37 DDD (DEGENERATIVE DISC DISEASE), LUMBOSACRAL: ICD-10-CM

## 2018-10-01 DIAGNOSIS — E55.9 VITAMIN D DEFICIENCY DISEASE: ICD-10-CM

## 2018-10-01 DIAGNOSIS — J30.89 NON-SEASONAL ALLERGIC RHINITIS DUE TO OTHER ALLERGIC TRIGGER: ICD-10-CM

## 2018-10-01 DIAGNOSIS — E78.5 HYPERLIPIDEMIA, UNSPECIFIED HYPERLIPIDEMIA TYPE: ICD-10-CM

## 2018-10-01 DIAGNOSIS — M51.35 DDD (DEGENERATIVE DISC DISEASE), THORACOLUMBAR: ICD-10-CM

## 2018-10-01 DIAGNOSIS — Z51.81 MEDICATION MONITORING ENCOUNTER: ICD-10-CM

## 2018-10-01 PROCEDURE — 90662 IIV NO PRSV INCREASED AG IM: CPT | Mod: PBBFAC,PO

## 2018-10-01 PROCEDURE — 99214 OFFICE O/P EST MOD 30 MIN: CPT | Mod: S$PBB,,, | Performed by: PHYSICIAN ASSISTANT

## 2018-10-01 PROCEDURE — 99213 OFFICE O/P EST LOW 20 MIN: CPT | Mod: PBBFAC,PO | Performed by: PHYSICIAN ASSISTANT

## 2018-10-01 PROCEDURE — 99999 PR PBB SHADOW E&M-EST. PATIENT-LVL III: CPT | Mod: PBBFAC,,, | Performed by: FAMILY MEDICINE

## 2018-10-01 PROCEDURE — 99214 OFFICE O/P EST MOD 30 MIN: CPT | Mod: S$PBB,,, | Performed by: FAMILY MEDICINE

## 2018-10-01 PROCEDURE — 99999 PR PBB SHADOW E&M-EST. PATIENT-LVL III: CPT | Mod: PBBFAC,,, | Performed by: PHYSICIAN ASSISTANT

## 2018-10-01 PROCEDURE — 96372 THER/PROPH/DIAG INJ SC/IM: CPT | Mod: PBBFAC,PO

## 2018-10-01 PROCEDURE — 99213 OFFICE O/P EST LOW 20 MIN: CPT | Mod: PBBFAC,27,PO | Performed by: FAMILY MEDICINE

## 2018-10-01 RX ORDER — FLUTICASONE PROPIONATE 50 MCG
2 SPRAY, SUSPENSION (ML) NASAL DAILY
Qty: 16 G | Refills: 3 | Status: SHIPPED | OUTPATIENT
Start: 2018-10-01 | End: 2019-03-27 | Stop reason: SDUPTHER

## 2018-10-01 RX ADMIN — DENOSUMAB 60 MG: 60 INJECTION SUBCUTANEOUS at 09:10

## 2018-10-01 NOTE — H&P (VIEW-ONLY)
Subjective:      Patient ID: Rajni Melchor is a 68 y.o. female.    Chief Complaint: Follow-up (3 weeks )    Disclaimer:  This note is prepared using voice recognition software and as such is likely to have errors and has not been proof read. Please contact me for questions.     Rajni Melchor is a 68 y.o. female who presents today  3 weeks for followup of suprapubic pain related to T12 radiculopathy. 4 days after steroids and muscle relaxant was resolved.  She is now set up for this week to do epidural steroid injection as well as a hip injection with pain interventionalist.  She has been trying to walk more each and every day of about 30 min.  Week since we reviewed her x-rays of her thoracic lumbar and bilateral hips today.    She asked me if there is any medication she can do for weight loss.  Previously she had been on Dexedrine at 1 point time as an ADD medicine.  We did discuss however that since she 68 that stimulants are not indicated for people over 65.  We also discussed that contrary would not be covered by her insurance and would prohibit some of her JESUSITA injections.  Only medication really see the I have some potential would possibly be 1 that would include Topamax.  She is not interested in doing 1 at this time that required to take it on a daily basis.  Previously she had done weight watchers before but only lost about 6 lb.  She felt like a lot of it was related to her thyroid.  We did review her thyroid studies which show that she is within goal ranges at 50 mcg.    Otherwise blood pressure is controlled today.      She does have osteoporosis for which she just received her Prolia injection today.          Lab Results   Component Value Date    WBC 5.34 03/27/2018    HGB 13.2 03/27/2018    HCT 41.1 03/27/2018     03/27/2018    CHOL 186 03/27/2018    TRIG 48 03/27/2018    HDL 88 (H) 03/27/2018    ALT 20 09/28/2018    AST 21 09/28/2018     09/28/2018    K 4.4 09/28/2018      "09/28/2018    CREATININE 0.9 09/28/2018    BUN 10 09/28/2018    CO2 27 09/28/2018    TSH 2.692 03/27/2018    HGBA1C 5.5 01/23/2013       Review of Systems   Constitutional: Negative for activity change and appetite change.   Respiratory: Negative for cough and shortness of breath.    Cardiovascular: Negative for chest pain and palpitations.   Gastrointestinal: Negative for abdominal distention and abdominal pain.   Musculoskeletal: Positive for back pain. Negative for arthralgias, gait problem, myalgias and neck pain.     Objective:     Vitals:    10/01/18 1242   BP: 118/72   Pulse: 74   Temp: 97.3 °F (36.3 °C)   TempSrc: Tympanic   Weight: 75.8 kg (167 lb 1.7 oz)   Height: 4' 11" (1.499 m)     Physical Exam   Constitutional: She appears well-developed and well-nourished.   Overweight WF   HENT:   Head: Normocephalic and atraumatic.   Right Ear: Tympanic membrane normal.   Left Ear: Tympanic membrane normal.   Mouth/Throat: Oropharynx is clear and moist.   Eyes: Conjunctivae and EOM are normal.   Neck: Normal range of motion. Neck supple.   Cardiovascular: Normal rate and regular rhythm.   Pulmonary/Chest: Effort normal and breath sounds normal.   Psychiatric: She has a normal mood and affect. Her behavior is normal.   Nursing note and vitals reviewed.    Assessment:     1. Thoracic radiculopathy    2. DDD (degenerative disc disease), thoracolumbar    3. DDD (degenerative disc disease), lumbosacral    4. Encounter for screening mammogram for breast cancer    5. Hypothyroidism, unspecified type    6. Age-related osteoporosis without current pathological fracture    7. Hyperlipidemia, unspecified hyperlipidemia type    8. Allergic rhinitis    9. Class 1 obesity without serious comorbidity with body mass index (BMI) of 33.0 to 33.9 in adult, unspecified obesity type      Plan:   Rajni was seen today for follow-up.    Diagnoses and all orders for this visit:    Thoracic radiculopathy-reviewed extensively her most " recent x-rays as well as her symptoms.  She is going for JESUSITA injection for the hips in the lower back this week.  Can use back when as needed.    DDD (degenerative disc disease), thoracolumbar-noted on multiple x-rays improving    DDD (degenerative disc disease), lumbosacral-noted on multiple x-rays improving with most recent set of injections    Encounter for screening mammogram for breast cancer-set up for the patient  -     Mammo Digital Screening Bilat with CAD; Future    Hypothyroidism, unspecified type-stable with supplementation continue work on weight loss advised her to work on weight watchers.  Declines wanting to do Topamax    Age-related osteoporosis without current pathological fracture-status post Prolia injection    Hyperlipidemia, unspecified hyperlipidemia type-continue with medications    Allergic rhinitis-refilled today for the patient  -     fluticasone (FLONASE) 50 mcg/actuation nasal spray; 2 sprays (100 mcg total) by Each Nare route once daily.    Other orders  -     Influenza - High Dose (65+) (PF) (IM)    Obesity-had extensive discussion with the patient today.  She will try to engage in doing a formalized plan such as weight watchers again that factors in the glycemic index for her.  She also continue work on exercise and increasing this over time as she gets improvement with her back.        Follow-up in about 1 year (around 10/1/2019) for physical with Dr HARDING.

## 2018-10-01 NOTE — PROGRESS NOTES
Subjective:       Patient ID: Rajni Melchor is a 68 y.o. female.    Chief Complaint: Osteoporosis    Rajni is back for rheumatology follow-up. She has osteoporosis by DEXA along with Vitamin D deficiency. Last DEXA on 8/15/2017 showed femur neck T-score of -2.5 and spine -1.6, this was improved from -2.7 and -2.1 respectively.  She has a history of multiple fractures in the past, R humerus shattered with fall in 2007 and L radius in 2014.  Had failed Evista and oral medications, so moved to Prolia 60 mg subcutaneous injection every 6 months.  She has received 5 injections thus far, her last 3/29/18. She is here today for evaluation of her OP and determination if we will continue her prolia.      She is on a daily ca, zinc, mag supplement, and  vitamin D 5000 IU/ 3 X weekly d, her last serum  vitamin D level 37 (3/2018).  She denies any falls or fractures since last visit.  Pain level 0/10    She is followed by Dr Coppola in pain mgmt for lumbar spondylosis and lumbar stenosis. States her low back pain is improved with home exercises.          Review of Systems   Constitutional: Negative.  Negative for activity change, appetite change, chills, fatigue and fever.   HENT: Negative.  Negative for mouth sores and trouble swallowing.         No dry mouth   Eyes: Negative.  Negative for photophobia, pain and redness.        No swollen or red eyes, no dry eye     Respiratory: Negative.  Negative for chest tightness, shortness of breath, wheezing and stridor.    Cardiovascular: Negative.  Negative for chest pain.   Gastrointestinal: Negative.  Negative for abdominal pain, blood in stool, diarrhea, nausea and vomiting.   Genitourinary: Negative.  Negative for dysuria, frequency, hematuria and urgency.   Musculoskeletal: Negative for arthralgias, back pain, gait problem, joint swelling, myalgias, neck pain and neck stiffness.   Skin: Negative.  Negative for color change, pallor and rash.   Neurological: Negative.   "Negative for weakness.   Hematological: Negative for adenopathy.   Psychiatric/Behavioral: Negative for suicidal ideas.         Objective:     /76   Pulse 67   Ht 4' 11" (1.499 m)   Wt 76.4 kg (168 lb 6.9 oz)   BMI 34.02 kg/m²      Physical Exam   Constitutional: She is oriented to person, place, and time and well-developed, well-nourished, and in no distress. No distress.   HENT:   Head: Normocephalic and atraumatic.   Right Ear: External ear normal.   Left Ear: External ear normal.   Mouth/Throat: No oropharyngeal exudate.   Eyes: Conjunctivae and EOM are normal. Pupils are equal, round, and reactive to light. No scleral icterus.   Neck: Normal range of motion. Neck supple. No thyromegaly present.   Cardiovascular: Normal rate, regular rhythm and normal heart sounds.    No murmur heard.  Pulmonary/Chest: Effort normal and breath sounds normal. She exhibits no tenderness.   Abdominal: Soft. Bowel sounds are normal.       Right Side Rheumatological Exam     Examination finds the elbow and wrist normal.    Knee Exam     Range of Motion   The patient has normal right knee ROM.  Patellofemoral Crepitus: positive  Effusion: negative  Warmth: negative    Muscle Strength (0-5 scale):  Biceps: 5/5   Triceps:  5  : 5/5   Iliopsoas: 5  Quadriceps:  5   Distal Lower Extremity: 5    Left Side Rheumatological Exam     Examination finds the elbow and wrist normal.    Knee Exam     Range of Motion   The patient has normal left knee ROM.    Patellofemoral Crepitus: negative  Effusion: negative  Warmth: negative    Muscle Strength (0-5 scale):  Biceps: 5/5   Triceps:  5  :  5/5   Iliopsoas: 5  Quadriceps:  5   Distal Lower Extremity: 5      Lymphadenopathy:     She has no cervical adenopathy.   Neurological: She is alert and oriented to person, place, and time. She displays normal reflexes. No cranial nerve deficit. She exhibits normal muscle tone. Gait normal.   Skin: Skin is warm and dry. No rash noted. "     Musculoskeletal: Normal range of motion. She exhibits no edema, tenderness or deformity.   She has no kyphosis noted in the back.  No tenderness to palpation along the cervical, thoracic, and lumbar spine.                 Recent Results (from the past 168 hour(s))   Comprehensive metabolic panel    Collection Time: 09/28/18 11:50 AM   Result Value Ref Range    Sodium 139 136 - 145 mmol/L    Potassium 4.4 3.5 - 5.1 mmol/L    Chloride 106 95 - 110 mmol/L    CO2 27 23 - 29 mmol/L    Glucose 99 70 - 110 mg/dL    BUN, Bld 10 8 - 23 mg/dL    Creatinine 0.9 0.5 - 1.4 mg/dL    Calcium 9.7 8.7 - 10.5 mg/dL    Total Protein 6.6 6.0 - 8.4 g/dL    Albumin 3.7 3.5 - 5.2 g/dL    Total Bilirubin 0.6 0.1 - 1.0 mg/dL    Alkaline Phosphatase 58 55 - 135 U/L    AST 21 10 - 40 U/L    ALT 20 10 - 44 U/L    Anion Gap 6 (L) 8 - 16 mmol/L    eGFR if African American >60.0 >60 mL/min/1.73 m^2    eGFR if non African American >60.0 >60 mL/min/1.73 m^2           Ref. Range 3/27/2018 09:49   Vit D, 25-Hydroxy Latest Ref Range: 30 - 96 ng/mL 37         DEXA 8/15/17 TF -1.5, FN -2.5, spine -1.6 - impression stable and improved     DEXA 7/27/14 total femur T score -1.6, femur -2.7, spine -2.1 impression osteoporosis     Assessment:       1. Age-related osteoporosis without current pathological fracture    2. Vitamin D deficiency disease    3. Medication monitoring encounter          1.  Osteoporosis by DEXA now on Prolia X 3 years (since 4/2016) after failing oral medicines including Evista and bisphosphonates with a history of prior fractures and falls. Dexa 8/15/17 improved but still osteoporosis- she has every 6 month apt to evaluate if we will continue prolia.    2.  Chronic low vitamin D on 2000 IU daily. Last level drawn 3/2018- 37- no changes on dose    3.  Medication monitoring with no current issues of toxicity    Plan:         Labs reviewed and done within past 14 days  Ca 9.7, Creat 0.9, eGFR >60  No contraindication for Prolia  today, ok for nurse to administer today  Re-evaluate patient again in 6 months to determine if Prolia still medically indicated and appropriate to administer.    KDIGO lab monitoring will be followed according to KDIGO 2017 Clinical Practice Guideline Update for the Diagnosis, Evaluation, Prevention, and Treatment of Chronic Kidney Disease-Mineral and Bone Disorder (CKD-MBD)  Chapter 3.1: Diagnosis of CKD-MBD: biochemical abnormalities      Continue once daily ca, mag and zinc  Continue Vit D 5000 IU 3 X weekly   Dexa stable and improved, continue prolia X 2 year and repeat dexa in 2 years (after 8/15/2019)    We'll see her back in 6 months including CMP, vitamin D a few days ahead  At next visit will evaluate if Prolia will be continued    Please call or send portal message with any questions or concerns

## 2018-10-01 NOTE — PROGRESS NOTES
Administered 1cc Prolia 60mg/cc to right upper quad of abdomen. Pt tolerated well. No acute reaction noted at site. Pt instructed on S/S of reaction to report. Pt verbalized understanding. Patient waited 15 minutes post injection    Lot:[id number]  Exp.11/20  Manu:Allan    Calcium: 9.7  Creatinine: 0.9

## 2018-10-04 ENCOUNTER — HOSPITAL ENCOUNTER (OUTPATIENT)
Facility: HOSPITAL | Age: 68
Discharge: HOME | End: 2018-10-04
Attending: ANESTHESIOLOGY | Admitting: ANESTHESIOLOGY
Payer: MEDICARE

## 2018-10-04 VITALS
DIASTOLIC BLOOD PRESSURE: 78 MMHG | SYSTOLIC BLOOD PRESSURE: 157 MMHG | RESPIRATION RATE: 15 BRPM | OXYGEN SATURATION: 100 % | HEART RATE: 73 BPM

## 2018-10-04 DIAGNOSIS — M53.3 SACROILIAC JOINT PAIN: ICD-10-CM

## 2018-10-04 DIAGNOSIS — M70.62 TROCHANTERIC BURSITIS OF LEFT HIP: ICD-10-CM

## 2018-10-04 DIAGNOSIS — M47.816 LUMBAR SPONDYLOSIS: ICD-10-CM

## 2018-10-04 PROCEDURE — 27096 INJECT SACROILIAC JOINT: CPT | Mod: LT,,, | Performed by: ANESTHESIOLOGY

## 2018-10-04 PROCEDURE — 63600175 PHARM REV CODE 636 W HCPCS

## 2018-10-04 PROCEDURE — 25000003 PHARM REV CODE 250

## 2018-10-04 PROCEDURE — 25000003 PHARM REV CODE 250: Performed by: ANESTHESIOLOGY

## 2018-10-04 PROCEDURE — 63600175 PHARM REV CODE 636 W HCPCS: Performed by: ANESTHESIOLOGY

## 2018-10-04 PROCEDURE — 20610 DRAIN/INJ JOINT/BURSA W/O US: CPT | Mod: 59,LT,, | Performed by: ANESTHESIOLOGY

## 2018-10-04 RX ORDER — LIDOCAINE HYDROCHLORIDE 20 MG/ML
INJECTION, SOLUTION INFILTRATION; PERINEURAL
Status: DISCONTINUED | OUTPATIENT
Start: 2018-10-04 | End: 2018-10-04 | Stop reason: HOSPADM

## 2018-10-04 RX ORDER — METHYLPREDNISOLONE ACETATE 40 MG/ML
INJECTION, SUSPENSION INTRA-ARTICULAR; INTRALESIONAL; INTRAMUSCULAR; SOFT TISSUE
Status: DISCONTINUED | OUTPATIENT
Start: 2018-10-04 | End: 2018-10-04 | Stop reason: HOSPADM

## 2018-10-04 RX ORDER — DIAZEPAM 5 MG/1
5 TABLET ORAL ONCE
Status: COMPLETED | OUTPATIENT
Start: 2018-10-04 | End: 2018-10-04

## 2018-10-04 RX ADMIN — DIAZEPAM 5 MG: 5 TABLET ORAL at 07:10

## 2018-10-04 NOTE — OP NOTE
PROCEDURE: Sacroiliac joint and Greater trochanteric bursa injection under fluoroscopic guidance       SIDE: LEFT Sacroiliac injection and LEFT greater trochanteric bursa injection      PROCEDURE DATE: 10/4/2018    PREOPERATIVE DIAGNOSIS: Sacroiliitis, greater trochanteric bursitis  POSTOPERATIVE DIAGNOSIS: Sacroiliitis, greater trochanteric bursitis    PROVIDER: Yuri Coppola MD  Assistant(s): none    Anesthesia: Local, Diazepam 5 mg po     >> 0 mg of VERSED    >> 0 mcg of FENTANYL     INDICATION: The patient has a history of pain due to sacroiliitis unresponsive to conservative treatments. Fluoroscopy was used to optimize visualization of needle placement and to maximize safety.       PROCEDURE DESCRIPTION: The patient was seen and identified in the preoperative area. Risks, benefits, complications, and alternatives were discussed with the patient. The patient agreed to proceed with the procedure and signed the consent. The site and side of the procedure was identified and marked.     The patient was taken to the procedural suite. The patient was positioned in prone orientation on procedure table. A time out was performed prior to any intervention. The procedure, site, side, and allergies were stated and agreed to by all present. The lumbosacral area extending to the skin overlying the femoral greater trochanter was widely prepped with ChloraPrep. The procedural site was draped in usual sterile fashion. Vital signs were closely monitored throughout this procedure.     The fluoroscopic camera was placed in contralateral oblique view and was adjusted until the anterior and posterior joint margins of the targeted sacroiliac joint aligned in linear array. The lower pole of the joint was identified, marked, and localized with 1% Lidocaine. A 25 gauge 3.5 inch spinal needle was introduced and advanced to the joint under fluoroscopic guidance. The joint space was entered and after negative aspiration, 2 mL of injectate  was posited into the joint space. The needle was then withdrawn outside of the joint space and after negative aspiration 1 mL of solution was injected outside of the joint space. The injectant solution used was comprised of 3 mL of 1% PF Lidocaine and 2 mL of Methylprednisolone (40 mg/mL). This techniques was performed for the above noted joint/s.    Following Sacroiliac Joint injection, the stylet was replaced and the needle was withdrawn intact. The LEFT greater trochanter was then identified with fluoroscopy. The targeted site of entry was localized with 1% PF Lidocaine. The above noted spinal needle was advanced to the lateral border of the greater trochanter until the osseus interface was met.  After negative aspiration, 2 mL of the above noted solution was injected. No pain or paresthesia was noted on injection. Following injection, the stylet was replaced and the needle was withdrawn intact. This technique was used for the above noted greater trochanteric bursa / bursae. The skin was cleaned and bandages were applied.    Description of Findings: Not applicable    Prosthetic devices, grafts, tissues, or devices implanted: None    Specimen Removed: No    Estimated Blood Loss: minimal    COMPLICATIONS: None    DISPOSITION / PLANS: The patient was transferred to the recovery area in a stable condition for observation. The patient was reexamined prior to discharge. There was no evidence of acute neurologic injury following the procedure.  Patient was discharged from the recovery room after meeting discharge criteria. Home discharge instructions were given to the patient by the staff.

## 2018-10-04 NOTE — PLAN OF CARE
Problem: Patient Care Overview  Goal: Plan of Care Review  Outcome: Outcome(s) achieved Date Met: 10/04/18  Patient discharged home in stable condition via wheelchair with ride. Verbalized understanding of discharge instructions. Patient voiced no complaints at this time. Patient stood at side of bed, walked steps with no new motor or sensory deficits. Neurologically intact.

## 2018-10-04 NOTE — DISCHARGE SUMMARY
[facility]  Discharge Note       Description of Procedure: Left Sacroiliac Joint and Greater Trochanteric Bursa Injection under Fluoroscopic Guidance    Procedure Date: 10/4/2018    Admit Date: 10/4/2018  Discharge Date: 10/4/2018     Attending Physician: Abdon Welch   Discharge Provider: Abdon Welch    Preoperative Diagnosis: Sacroiliitis and Greater Trochanteric Bursitis     Postoperative Diagnosis: as above, same as preoperative diagnosis    Discharged Condition: Stable    Hospital Course: Patient was admitted for an outpatient procedure. The procedure was tolerated well with no complications.    Final Diagnoses: Same as principal problem.    Disposition: Home, self-care.    Follow up/Patient Instructions:  Follow-up in clinic in 2-3 weeks.    Medications: No medications were prescribed today. The patient was advised to resume normal medication regimen without change.  Specific information was provided regarding restarting any anticoagulant/s.    Discharge Procedure Orders (must include Diet, Follow-up, Activity):  Light activity for the remainder of the day, resume normal activity tomorrow. Resume normal diet. Follow-up in clinic in 2-3 weeks.

## 2018-10-08 ENCOUNTER — HOSPITAL ENCOUNTER (OUTPATIENT)
Dept: RADIOLOGY | Facility: HOSPITAL | Age: 68
Discharge: HOME | End: 2018-10-08
Attending: FAMILY MEDICINE
Payer: MEDICARE

## 2018-10-08 DIAGNOSIS — Z12.31 ENCOUNTER FOR SCREENING MAMMOGRAM FOR BREAST CANCER: ICD-10-CM

## 2018-10-08 PROCEDURE — 77063 BREAST TOMOSYNTHESIS BI: CPT | Mod: TC

## 2018-10-08 PROCEDURE — 77063 BREAST TOMOSYNTHESIS BI: CPT | Mod: 26,,, | Performed by: RADIOLOGY

## 2018-10-08 PROCEDURE — 77067 SCR MAMMO BI INCL CAD: CPT | Mod: 26,,, | Performed by: RADIOLOGY

## 2018-10-08 PROCEDURE — 77067 SCR MAMMO BI INCL CAD: CPT | Mod: TC

## 2018-11-01 ENCOUNTER — OFFICE VISIT (OUTPATIENT)
Dept: URGENT CARE | Facility: CLINIC | Age: 68
End: 2018-11-01
Payer: MEDICARE

## 2018-11-01 VITALS
DIASTOLIC BLOOD PRESSURE: 70 MMHG | WEIGHT: 165 LBS | BODY MASS INDEX: 32.39 KG/M2 | OXYGEN SATURATION: 98 % | SYSTOLIC BLOOD PRESSURE: 110 MMHG | RESPIRATION RATE: 18 BRPM | HEART RATE: 72 BPM | HEIGHT: 60 IN | TEMPERATURE: 98 F

## 2018-11-01 DIAGNOSIS — J06.9 VIRAL URI: Primary | ICD-10-CM

## 2018-11-01 PROCEDURE — 99213 OFFICE O/P EST LOW 20 MIN: CPT | Mod: PBBFAC,PO | Performed by: PHYSICIAN ASSISTANT

## 2018-11-01 PROCEDURE — 99999 PR PBB SHADOW E&M-EST. PATIENT-LVL III: CPT | Mod: PBBFAC,,, | Performed by: PHYSICIAN ASSISTANT

## 2018-11-01 PROCEDURE — 99213 OFFICE O/P EST LOW 20 MIN: CPT | Mod: S$PBB,,, | Performed by: PHYSICIAN ASSISTANT

## 2018-11-01 RX ORDER — MINERAL OIL
180 ENEMA (ML) RECTAL
COMMUNITY
Start: 2007-12-27 | End: 2023-09-28 | Stop reason: SDUPTHER

## 2018-11-01 NOTE — PROGRESS NOTES
"Subjective:      Patient ID: Rajni Melchor is a 68 y.o. female.    Chief Complaint: Headache (earpain, sore throat on the Left side)    Sore Throat    This is a new problem. The current episode started in the past 7 days. The problem has been gradually worsening. Associated symptoms include congestion, ear pain (L side) and headaches (yesterday, resolved). Pertinent negatives include no abdominal pain, coughing, diarrhea, shortness of breath or vomiting. Treatments tried: Allegra, nasonex.     Review of Systems   Constitutional: Negative for chills, diaphoresis and fever.   HENT: Positive for congestion, ear pain (L side), rhinorrhea, sneezing and sore throat (L side).         Blister on palate earlier in the week, but has since resolved (took Zovirax)   Respiratory: Negative for cough, shortness of breath and wheezing.    Gastrointestinal: Negative for abdominal pain, constipation, diarrhea, nausea and vomiting.   Skin: Negative for rash.   Neurological: Positive for headaches (yesterday, resolved).       Objective:   /70   Pulse 72   Temp 97.7 °F (36.5 °C) (Tympanic)   Resp 18   Ht 4' 11.5" (1.511 m)   Wt 74.8 kg (165 lb)   SpO2 98%   BMI 32.77 kg/m²   Physical Exam   Constitutional: She appears well-developed and well-nourished. She does not appear ill. No distress.   HENT:   Head: Normocephalic and atraumatic.   Right Ear: Tympanic membrane and ear canal normal. Tympanic membrane is not erythematous. No middle ear effusion.   Left Ear: Tympanic membrane and ear canal normal. Tympanic membrane is not erythematous.  No middle ear effusion.   Nose: No mucosal edema or rhinorrhea. Right sinus exhibits no maxillary sinus tenderness and no frontal sinus tenderness. Left sinus exhibits maxillary sinus tenderness (mild). Left sinus exhibits no frontal sinus tenderness.   Mouth/Throat: Uvula is midline and oropharynx is clear and moist. No posterior oropharyngeal erythema.   Cardiovascular: Normal " rate, regular rhythm and normal heart sounds.   No murmur heard.  Pulmonary/Chest: Effort normal and breath sounds normal. No respiratory distress. She has no decreased breath sounds. She has no wheezes. She has no rhonchi. She has no rales.   Skin: Skin is warm and dry. No rash noted. She is not diaphoretic.   Psychiatric: She has a normal mood and affect. Her speech is normal and behavior is normal. Thought content normal.     Assessment:      1. Viral URI       Plan:   Viral URI    Offer magic mouthrinse, patient declines    Advised patient based on time frame and symptomology this is likely a viral upper respiratory infection.  It does not require antibiotics at this time.    Will treat symptoms with OTC meds.  If no improvement, or if condition worsens, follow up with PCP.     Gave handout on viral URI.  Printed AVS and reviewed treatment plan in detail.    Discussed worsening signs/symptoms and when to return to clinic or go to ED.   Patient expresses understanding and agrees with treatment plan.

## 2018-11-01 NOTE — PATIENT INSTRUCTIONS
Viral Upper Respiratory Illness (Adult)  You have a viral upper respiratory illness (URI), which is another term for the common cold. This illness is contagious during the first few days. It is spread through the air by coughing and sneezing. It may also be spread by direct contact (touching the sick person and then touching your own eyes, nose, or mouth). Frequent handwashing will decrease risk of spread. Most viral illnesses go away within 7 to 10 days with rest and simple home remedies. Sometimes the illness may last for several weeks. Antibiotics will not kill a virus, and they are generally not prescribed for this condition.    Home care  · If symptoms are severe, rest at home for the first 2 to 3 days. When you resume activity, don't let yourself get too tired.  · Avoid being exposed to cigarette smoke (yours or others).  · You may use acetaminophen or ibuprofen to control pain and fever, unless another medicine was prescribed. (Note: If you have chronic liver or kidney disease, have ever had a stomach ulcer or gastrointestinal bleeding, or are taking blood-thinning medicines, talk with your healthcare provider before using these medicines.) Aspirin should never be given to anyone under 18 years of age who is ill with a viral infection or fever. It may cause severe liver or brain damage.  · Your appetite may be poor, so a light diet is fine. Avoid dehydration by drinking 6 to 8 glasses of fluids per day (water, soft drinks, juices, tea, or soup). Extra fluids will help loosen secretions in the nose and lungs.  · Over-the-counter cold medicines will not shorten the length of time youre sick, but they may be helpful for the following symptoms: cough, sore throat, and nasal and sinus congestion. (Note: Do not use decongestants if you have high blood pressure.)  Follow-up care  Follow up with your healthcare provider, or as advised.  When to seek medical advice  Call your healthcare provider right away if any  of these occur:  · Cough with lots of colored sputum (mucus)  · Severe headache; face, neck, or ear pain  · Difficulty swallowing due to throat pain  · Fever of 100.4°F (38°C)  Call 911, or get immediate medical care  Call emergency services right away if any of these occur:  · Chest pain, shortness of breath, wheezing, or difficulty breathing  · Coughing up blood  · Inability to swallow due to throat pain  Date Last Reviewed: 9/13/2015  © 9634-7465 [employer]. [address] [place] [id number]. All rights reserved. This information is not intended as a substitute for professional medical care. Always follow your healthcare professional's instructions.

## 2019-01-07 RX ORDER — LEVOTHYROXINE SODIUM 50 UG/1
TABLET ORAL
Qty: 90 TABLET | Refills: 3 | Status: SHIPPED | OUTPATIENT
Start: 2019-01-07 | End: 2020-01-28 | Stop reason: SDUPTHER

## 2019-03-25 ENCOUNTER — OFFICE VISIT (OUTPATIENT)
Dept: URGENT CARE | Facility: CLINIC | Age: 69
End: 2019-03-25
Payer: MEDICARE

## 2019-03-25 ENCOUNTER — HOSPITAL ENCOUNTER (OUTPATIENT)
Dept: RADIOLOGY | Facility: HOSPITAL | Age: 69
Discharge: HOME | End: 2019-03-25
Attending: NURSE PRACTITIONER
Payer: MEDICARE

## 2019-03-25 VITALS
HEART RATE: 68 BPM | WEIGHT: 165.13 LBS | DIASTOLIC BLOOD PRESSURE: 76 MMHG | TEMPERATURE: 96 F | HEIGHT: 60 IN | OXYGEN SATURATION: 97 % | SYSTOLIC BLOOD PRESSURE: 128 MMHG | BODY MASS INDEX: 32.42 KG/M2

## 2019-03-25 DIAGNOSIS — M25.561 ACUTE PAIN OF RIGHT KNEE: Primary | ICD-10-CM

## 2019-03-25 DIAGNOSIS — M25.561 ACUTE PAIN OF RIGHT KNEE: ICD-10-CM

## 2019-03-25 DIAGNOSIS — S76.011A MUSCLE STRAIN OF GLUTEAL REGION, RIGHT, INITIAL ENCOUNTER: ICD-10-CM

## 2019-03-25 PROCEDURE — 73560 X-RAY EXAM OF KNEE 1 OR 2: CPT | Mod: TC,FY,PO,RT

## 2019-03-25 PROCEDURE — 96372 THER/PROPH/DIAG INJ SC/IM: CPT | Mod: PBBFAC,PO

## 2019-03-25 PROCEDURE — 73560 X-RAY EXAM OF KNEE 1 OR 2: CPT | Mod: 26,59,RT, | Performed by: RADIOLOGY

## 2019-03-25 PROCEDURE — 99214 PR OFFICE/OUTPT VISIT, EST, LEVL IV, 30-39 MIN: ICD-10-PCS | Mod: S$PBB,,, | Performed by: NURSE PRACTITIONER

## 2019-03-25 PROCEDURE — 99999 PR PBB SHADOW E&M-EST. PATIENT-LVL V: ICD-10-PCS | Mod: PBBFAC,,, | Performed by: NURSE PRACTITIONER

## 2019-03-25 PROCEDURE — 73562 XR KNEE ORTHO RIGHT: ICD-10-PCS | Mod: 26,RT,, | Performed by: RADIOLOGY

## 2019-03-25 PROCEDURE — 73560 XR KNEE ORTHO RIGHT: ICD-10-PCS | Mod: 26,59,RT, | Performed by: RADIOLOGY

## 2019-03-25 PROCEDURE — 73562 X-RAY EXAM OF KNEE 3: CPT | Mod: 26,RT,, | Performed by: RADIOLOGY

## 2019-03-25 PROCEDURE — 99214 OFFICE O/P EST MOD 30 MIN: CPT | Mod: S$PBB,,, | Performed by: NURSE PRACTITIONER

## 2019-03-25 PROCEDURE — 99999 PR PBB SHADOW E&M-EST. PATIENT-LVL V: CPT | Mod: PBBFAC,,, | Performed by: NURSE PRACTITIONER

## 2019-03-25 PROCEDURE — 99215 OFFICE O/P EST HI 40 MIN: CPT | Mod: PBBFAC,25,PO | Performed by: NURSE PRACTITIONER

## 2019-03-25 PROCEDURE — 73562 X-RAY EXAM OF KNEE 3: CPT | Mod: TC,FY,PO,RT

## 2019-03-25 RX ORDER — KETOROLAC TROMETHAMINE 30 MG/ML
30 INJECTION, SOLUTION INTRAMUSCULAR; INTRAVENOUS ONCE
Status: COMPLETED | OUTPATIENT
Start: 2019-03-25 | End: 2019-03-25

## 2019-03-25 RX ORDER — KETOROLAC TROMETHAMINE 30 MG/ML
30 INJECTION, SOLUTION INTRAMUSCULAR; INTRAVENOUS
Status: DISCONTINUED | OUTPATIENT
Start: 2019-03-25 | End: 2020-01-28

## 2019-03-25 RX ORDER — KETOROLAC TROMETHAMINE 30 MG/ML
30 INJECTION, SOLUTION INTRAMUSCULAR; INTRAVENOUS
Status: CANCELLED | OUTPATIENT
Start: 2019-03-25

## 2019-03-25 RX ADMIN — KETOROLAC TROMETHAMINE 30 MG: 30 INJECTION, SOLUTION INTRAMUSCULAR; INTRAVENOUS at 03:03

## 2019-03-25 NOTE — PATIENT INSTRUCTIONS
Hip Strain    You have a strain of the muscles around the hip joint. A muscle strain is a stretching or tearing of muscle fibers. This causes pain, especially when you move that muscle. There may also be some swelling and bruising.  Home care  · Stay off the injured leg as much as possible until you can walk on it without pain. If you have a lot of pain with walking, crutches or a walker may be prescribed. These can be rented or purchased at many pharmacies and surgical or orthopedic supply stores. Follow your healthcare provider's advice regarding when to begin putting weight on that leg.  · Apply an ice pack over the injured area for 15 to 20 minutes every 3 to 6 hours. You should do this for the first 24 to 48 hours. You can make an ice pack by filling a plastic bag that seals at the top with ice cubes and then wrapping it with a thin towel. Be careful not to injure your skin with the ice treatments. Ice should never be applied directly to skin. Continue the use of ice packs for relief of pain and swelling as needed. After 48 hours, apply heat (warm shower or warm bath) for 15 to 20 minutes several times a day, or alternate ice and heat.  · You may use over-the-counter pain medicine to control pain, unless another pain medicine was prescribed. If you have chronic liver or kidney disease or ever had a stomach ulcer or GI bleeding, talk with your healthcare provider beforeusing these medicines.  · If you play sports, you may resume these activities when you are able to hop and run on the injured leg without pain.  Follow-up care  Follow up with your healthcare provider, or as advised. If your symptoms do not begin to get better after a week, more tests may be needed.  If X-rays were taken, you will be told of any new findings that may affect your care.  When to seek medical advice  Call your healthcare provider right away if any of these occur:  · Increased swelling or bruising  · Increased pain  · Losing the  ability to put weight on the injured side  Date Last Reviewed: 11/19/2015  © 6934-4282 [employer]. [address] [place] [address]. All rights reserved. This information is not intended as a substitute for professional medical care. Always follow your healthcare professional's instructions.        Reducing Knee Pain and Swelling    Many treatments can help reduce pain and swelling in your knee. Your healthcare provider or physical therapist may suggest one or more of the following treatments:  · Icing your knee helps reduce swelling. You may be asked to ice your knee once a day or more. Apply ice for about 15 to 20 minutes at a time, with at least 40 minutes between sessions. Always keep a towel between the ice and your skin.   · Keeping your leg raised above your heart helps excess fluid flow out of your knee joint. This reduces swelling.  · Compression means wrapping an elastic bandage or neoprene sleeve snugly around your knees. This keeps fluid from collecting in your knee joint.  · Electrical stimulation, done by a physical therapist or , can help reduce excess fluid in your knee joint.  · Anti-inflammatory medicines may be prescribed by your healthcare provider. You may take pills or receive injections in your knee.  · Isometric (milo) exercises strengthen the muscles that support your knee joint. They also help reduce excess fluid in your knee.  · Massage helps fluid drain away from your knee.  Date Last Reviewed: 10/13/2015  © 8342-8714 [employer]. [address] [place] [address]. All rights reserved. This information is not intended as a substitute for professional medical care. Always follow your healthcare professional's instructions.        Muscle Strain in the Extremities  A muscle strain is a stretching and tearing of muscle fibers. This causes pain, especially when you move that muscle. There may also be some swelling and  bruising.  Home care  · Keep the hurt area raised to reduce pain and swelling. This is especially important during the first 48 hours.  · Apply an ice pack over the injured area for 15 to 20 minutes every 3 to 6 hours. You should do this for the first 24 to 48 hours. You can make an ice pack by filling a plastic bag that seals at the top with ice cubes and then wrapping it with a thin towel. Be careful not to injure your skin with the ice treatments. Ice should never be applied directly to skin. Continue the use of ice packs for relief of pain and swelling as needed. After 48 hours, apply heat (warm shower or warm bath) for 15 to 20 minutes several times a day, or alternate ice and heat.  · You may use over-the-counter pain medicine to control pain, unless another medicine was prescribed. If you have chronic liver or kidney disease or ever had a stomach ulcer or GI bleeding, talk with your healthcare provider before using these medicines.  · For leg strains: If crutches have been recommended, dont put full weight on the hurt leg until you can do so without pain. You can return to sports when you are able to hop and run on the injured leg without pain.  Follow-up care  Follow up with your healthcare provider, or as advised.  When to seek medical advice  Call your healthcare provider right away if any of these occur:  · The toes of the injured leg become swollen, cold, blue, numb, or tingly  · Pain or swelling increases  Date Last Reviewed: 11/19/2015  © 4787-8511 [employer]. [address] [place] [address], [place] [address]. All rights reserved. This information is not intended as a substitute for professional medical care. Always follow your healthcare professional's instructions.        RICE     Rest an injury, elevate it, and use ice and compression as directed.   RICE stands for rest, ice, compression, and elevation. These can limit pain and swelling after an injury. RICE may be recommended to help treat  fractures, sprains, strains, and bruises or bumps.   Home care  The following explain the details of RICE:  · Rest. Limit the use of the injured body part. This helps prevent further damage to the body part and gives it time to heal. In some cases, you may need a sling, brace, splint, or cast to help keep the body part still until it has healed.  · Ice. Applying ice right after an injury helps relieve pain and swelling. Wrap a bag of ice in a thin towel. Then, place it over the injured area. Do this for 10 to 15 minutes every 3 to 4 hours. Continue for the next 1 to 3 days or until your symptoms improve. Never put ice directly on your skin or ice an area longer than 15 minutes at a time.  · Compression. Putting pressure on an injury helps reduce swelling and provides support. Wrap the injured area firmly with an elastic bandage/wrap. Make sure not to wrap the bandage too tightly or you will cut off blood flow to the injured area. If your bandage loosens, rewrap it.  · Elevation. Keeping an injury raised above the level of your heart reduces swelling, pain, and throbbing. For instance, if you have a broken leg, it may help to rest your leg on several pillows when sitting or lying down. Try to keep the injured area elevated for at least 2 to 3 hours per day.  Follow-up care  Follow up with your healthcare provider, or as advised.  When to seek medical advice  Call your healthcare provider right away if any of these occur:  · Fever of 100.4°F (38°C) or higher, or as directed by your healthcare provider  · Increased pain or swelling in the injured body part  · Injured body part becomes cold, blue, numb, or tingly  · Signs of infection. These include warmth in the skin, redness, drainage, or bad smell coming from the injured body part.  Date Last Reviewed: 1/18/2016 © 2000-2017 [employer]. [address] [place] [address], [place] [address]. All rights reserved. This information is not intended as a substitute for  professional medical care. Always follow your healthcare professional's instructions.

## 2019-03-25 NOTE — PROGRESS NOTES
Subjective:      Patient ID: Rajni Melchor is a 69 y.o. female.    Chief Complaint: Knee Pain and Hip Pain      68 y/o WF presents to the  with reports of right hip pain that radiates to right knee that started on yesterday after dancing, 8/10 aching pain to knee, hurts with movement or twisting. Took Ibuprofen 800mg this morning that helped a little    Review of Systems   Constitutional: Positive for activity change.   Respiratory: Negative for chest tightness, shortness of breath and wheezing.    Cardiovascular: Negative for chest pain and palpitations.   Gastrointestinal: Negative for abdominal pain, diarrhea, nausea and vomiting.   Musculoskeletal: Positive for arthralgias (right medial knee, right hip), gait problem (limp on RLE) and myalgias.        Soreness from posterior right hip to posterior knee   Neurological: Negative for weakness and numbness.        Objective:     Vitals:    03/25/19 1432   BP: 128/76   Pulse: 68   Temp: 96.1 °F (35.6 °C)     Physical Exam   Constitutional: She is oriented to person, place, and time. Vital signs are normal. She appears well-developed and well-nourished. She is cooperative. No distress.   HENT:   Head: Normocephalic.   Right Ear: Hearing and external ear normal.   Left Ear: Hearing and external ear normal.   Eyes: Pupils are equal, round, and reactive to light. Conjunctivae and lids are normal. Right eye exhibits no discharge.   Neck: Normal range of motion and full passive range of motion without pain. Neck supple.   Cardiovascular: Normal rate, regular rhythm and normal heart sounds.   Pulmonary/Chest: Effort normal. No accessory muscle usage. No tachypnea and no bradypnea. No respiratory distress.   Musculoskeletal:        Right hip: She exhibits tenderness (mild). She exhibits normal range of motion, normal strength, no bony tenderness, no swelling, no crepitus, no deformity and no laceration.        Right knee: She exhibits decreased range of motion and  effusion. She exhibits no swelling, no ecchymosis, no deformity, no laceration, no erythema and no bony tenderness. Tenderness found. Medial joint line tenderness noted. No lateral joint line and no patellar tendon tenderness noted.   Neurological: She is alert and oriented to person, place, and time. She has normal strength. Gait (limp on RLE) abnormal.   Skin: She is not diaphoretic.   Nursing note and vitals reviewed.      Assessment:     1. Acute pain of right knee    2. Muscle strain of gluteal region, right, initial encounter        Plan:     Rajni was seen today for knee pain and hip pain.    Diagnoses and all orders for this visit:    Acute pain of right knee  -     Cancel: X-Ray Knee 3 View Right; Future  -     Ambulatory Referral to Orthopedics    Muscle strain of gluteal region, right, initial encounter    Other orders  -     ketorolac injection 30 mg  -     Cancel: ketorolac (TORADOL) 30 mg/mL (1 mL) injection; Inject 1 mL (30 mg total) into the vein.  -     ketorolac injection 30 mg    FINDINGS:  There is mild-to-moderate joint space narrowing and marginal osteophyte formation seen involving the medial compartment of either knee.  Minimal marginal osteophyte formation involving the lateral compartment of either knee with no significant joint space narrowing.  Minimal degenerative involving the right patellofemoral compartment.  No joint effusion.  No acute fracture or dislocation.  PRICE (Protect, Rest, Ice, Compression with knee brace provided in clinic and Elevate) affected area.  Alternate heat and cold packs   Take prescribed medications as directed.  Follow up with Primary Care Provider/Ortho   KARLA Greco, FNP-C

## 2019-03-26 ENCOUNTER — OFFICE VISIT (OUTPATIENT)
Dept: ORTHOPEDICS | Facility: CLINIC | Age: 69
End: 2019-03-26
Payer: MEDICARE

## 2019-03-26 ENCOUNTER — HOSPITAL ENCOUNTER (OUTPATIENT)
Dept: RADIOLOGY | Facility: HOSPITAL | Age: 69
Discharge: HOME | End: 2019-03-26
Attending: ORTHOPAEDIC SURGERY
Payer: MEDICARE

## 2019-03-26 VITALS
WEIGHT: 165 LBS | HEIGHT: 60 IN | SYSTOLIC BLOOD PRESSURE: 144 MMHG | HEART RATE: 55 BPM | DIASTOLIC BLOOD PRESSURE: 81 MMHG | BODY MASS INDEX: 32.39 KG/M2

## 2019-03-26 DIAGNOSIS — M25.561 CHRONIC PAIN OF RIGHT KNEE: ICD-10-CM

## 2019-03-26 DIAGNOSIS — R52 PAIN: ICD-10-CM

## 2019-03-26 DIAGNOSIS — M17.11 PRIMARY OSTEOARTHRITIS OF RIGHT KNEE: Primary | ICD-10-CM

## 2019-03-26 DIAGNOSIS — G89.29 CHRONIC PAIN OF RIGHT KNEE: ICD-10-CM

## 2019-03-26 DIAGNOSIS — M25.561 ACUTE PAIN OF RIGHT KNEE: ICD-10-CM

## 2019-03-26 DIAGNOSIS — M21.161 ACQUIRED VARUS DEFORMITY KNEE, RIGHT: ICD-10-CM

## 2019-03-26 DIAGNOSIS — R52 PAIN: Primary | ICD-10-CM

## 2019-03-26 PROCEDURE — 73562 X-RAY EXAM OF KNEE 3: CPT | Mod: 26,59,RT, | Performed by: RADIOLOGY

## 2019-03-26 PROCEDURE — 99204 PR OFFICE/OUTPT VISIT, NEW, LEVL IV, 45-59 MIN: ICD-10-PCS | Mod: 25,S$PBB,, | Performed by: ORTHOPAEDIC SURGERY

## 2019-03-26 PROCEDURE — 73562 XR KNEE ORTHO RIGHT WITH FLEXION: ICD-10-PCS | Mod: 26,59,RT, | Performed by: RADIOLOGY

## 2019-03-26 PROCEDURE — 73564 X-RAY EXAM KNEE 4 OR MORE: CPT | Mod: 26,RT,, | Performed by: RADIOLOGY

## 2019-03-26 PROCEDURE — 73564 XR KNEE ORTHO RIGHT WITH FLEXION: ICD-10-PCS | Mod: 26,RT,, | Performed by: RADIOLOGY

## 2019-03-26 PROCEDURE — 20610 LARGE JOINT ASPIRATION/INJECTION: R KNEE: ICD-10-PCS | Mod: S$PBB,RT,, | Performed by: ORTHOPAEDIC SURGERY

## 2019-03-26 PROCEDURE — 99204 OFFICE O/P NEW MOD 45 MIN: CPT | Mod: 25,S$PBB,, | Performed by: ORTHOPAEDIC SURGERY

## 2019-03-26 PROCEDURE — 99999 PR PBB SHADOW E&M-EST. PATIENT-LVL III: ICD-10-PCS | Mod: PBBFAC,,, | Performed by: ORTHOPAEDIC SURGERY

## 2019-03-26 PROCEDURE — 73562 X-RAY EXAM OF KNEE 3: CPT | Mod: TC,RT

## 2019-03-26 PROCEDURE — 99999 PR PBB SHADOW E&M-EST. PATIENT-LVL III: CPT | Mod: PBBFAC,,, | Performed by: ORTHOPAEDIC SURGERY

## 2019-03-26 PROCEDURE — 99213 OFFICE O/P EST LOW 20 MIN: CPT | Mod: PBBFAC,25,PN | Performed by: ORTHOPAEDIC SURGERY

## 2019-03-26 PROCEDURE — 20610 DRAIN/INJ JOINT/BURSA W/O US: CPT | Mod: PBBFAC,PN | Performed by: ORTHOPAEDIC SURGERY

## 2019-03-26 RX ORDER — METHYLPREDNISOLONE ACETATE 80 MG/ML
80 INJECTION, SUSPENSION INTRA-ARTICULAR; INTRALESIONAL; INTRAMUSCULAR; SOFT TISSUE
Status: DISCONTINUED | OUTPATIENT
Start: 2019-03-26 | End: 2019-03-26 | Stop reason: HOSPADM

## 2019-03-26 RX ADMIN — METHYLPREDNISOLONE ACETATE 80 MG: 80 INJECTION, SUSPENSION INTRALESIONAL; INTRAMUSCULAR; INTRASYNOVIAL; SOFT TISSUE at 11:03

## 2019-03-26 NOTE — PROGRESS NOTES
Subjective:     Patient ID: Rajni Melchor is a 69 y.o. female.    Chief Complaint: Pain of the Right Knee    Ms. Melchor complains of constant pain to right medial knee that started on Sunday after dancing.  She denies any specific injury to the knee.  Weight bearing makes the pain worse; nothing makes it better.  She received a ketorolac injection yesterday from her PCP and states she is feeling somewhat better.     Knee Injury    The pain is present in the right knee. This is a new problem. The current episode started in the past 7 days (3/24/2019). The injury was the result of a action Location of injury: dance floor. Movement associated with injury: pt states she was dancing and when she walked off the dance floor her knee started hurting.The problem occurs constantly. The problem has been gradually worsening. The quality of the pain is described as aching. The pain is at a severity of 8/10. Pertinent negatives include no fever or itching. The symptoms are aggravated by activity, bending, bearing weight, walking, standing and rotation. She has tried nothing for the symptoms. Physical therapy was not tried.      Past Medical History:   Diagnosis Date    Allergic rhinitis     History of colon polyps 5/5/2016    Hypothyroidism     Osteopenia     S/P nasal septoplasty     Vitamin D deficiency disease      Past Surgical History:   Procedure Laterality Date    APPENDECTOMY      bitubal ligation      COLONOSCOPY N/A 5/5/2016    Performed by [facility]    metal armida in right arm      NASAL SEPTOPLASTY W/ TURBINOPLASTY      SHOULDER SURGERY      steel plate in right shoulder,     WRIST SURGERY       Family History   Problem Relation Age of Onset    Diabetes Sister     Pacemaker/defibrilator Sister     Diabetes Mother     Hypertension Mother     Cancer Father         liver and bladder    Melanoma Maternal Uncle     Colon cancer Maternal Uncle     Breast cancer Cousin      Breast cancer Other     Breast cancer Other     Psoriasis Neg Hx     Lupus Neg Hx     Eczema Neg Hx      Social History     Socioeconomic History    Marital status:      Spouse name: Not on file    Number of children: 3    Years of education: Not on file    Highest education level: Not on file   Occupational History    Occupation: retired   Social Needs    Financial resource strain: Not on file    Food insecurity:     Worry: Not on file     Inability: Not on file    Transportation needs:     Medical: Not on file     Non-medical: Not on file   Tobacco Use    Smoking status: Former Smoker     Packs/day: 2.00     Years: 16.00     Pack years: 32.00     Last attempt to quit: 1988     Years since quittin.9    Smokeless tobacco: Never Used   Substance and Sexual Activity    Alcohol use: Yes     Comment: socially    Drug use: No    Sexual activity: Yes     Partners: Male   Lifestyle    Physical activity:     Days per week: Not on file     Minutes per session: Not on file    Stress: Not on file   Relationships    Social connections:     Talks on phone: Not on file     Gets together: Not on file     Attends [employer] service: Not on file     Active member of club or organization: Not on file     Attends meetings of clubs or organizations: Not on file     Relationship status: Not on file    Intimate partner violence:     Fear of current or ex partner: Not on file     Emotionally abused: Not on file     Physically abused: Not on file     Forced sexual activity: Not on file   Other Topics Concern    Are you pregnant or think you may be? Not Asked    Breast-feeding Not Asked   Social History Narrative    Not on file     Medication List with Changes/Refills   Current Medications    ACYCLOVIR (ZOVIRAX) 400 MG TABLET    Take 1 tablet (400 mg total) by mouth 5 (five) times daily. For 3 days for fever blisters    CALCIUM-MAGNESIUM-ZINC 333-133-5 MG TAB    Take 1,200 mg by mouth.     CHOLECALCIFEROL, VITAMIN D3, (VITAMIN D3) 2,000 UNIT CAP    Take 1 capsule (2,000 Units total) by mouth once daily.    FEXOFENADINE (ALLEGRA) 180 MG TABLET    Take 180 mg by mouth.     FLUTICASONE (FLONASE) 50 MCG/ACTUATION NASAL SPRAY    2 sprays (100 mcg total) by Each Nare route once daily.    IBUPROFEN (ADVIL,MOTRIN) 800 MG TABLET    Take 1 tablet (800 mg total) by mouth 2 (two) times daily as needed.    LEVOXYL 50 MCG TABLET    TAKE 1 TABLET DAILY     Review of patient's allergies indicates:   Allergen Reactions    Augmentin [amoxicillin-pot clavulanate] Nausea And Vomiting    Adhesive Rash     Blastic band aid     Review of Systems   Constitution: Negative for fever.   HENT: Negative for sore throat.    Eyes: Negative for blurred vision.   Cardiovascular: Negative for dyspnea on exertion.   Respiratory: Negative for shortness of breath.    Hematologic/Lymphatic: Does not bruise/bleed easily.   Skin: Negative for itching.   Musculoskeletal: Positive for joint pain.   Gastrointestinal: Negative for vomiting.   Genitourinary: Negative for dysuria.   Neurological: Negative for dizziness.   Psychiatric/Behavioral: The patient does not have insomnia.        Objective:   Body mass index is 32.77 kg/m².  Vitals:    03/26/19 0941   BP: (!) 144/81   Pulse: (!) 55           General    Nursing note and vitals reviewed.  Constitutional: She is oriented to person, place, and time. She appears well-developed. No distress.   HENT:   Head: Normocephalic and atraumatic.   Eyes: EOM are normal.   Cardiovascular: Normal rate.    Pulmonary/Chest: Effort normal. No stridor.   Neurological: She is alert and oriented to person, place, and time.   Psychiatric: She has a normal mood and affect. Her behavior is normal.     General Musculoskeletal Exam   Gait: antalgic       Right Knee Exam     Inspection   Scars: absent  Effusion: present    Tenderness   The patient is tender to palpation of the medial joint line (medial tibia  plateau).    Range of Motion   Extension: 0   Flexion: 110     Tests   Ligament Examination Lachman: normal (-1 to 2mm) PCL-Posterior Drawer: normal (0 to 2mm)     MCL - Valgus: normal (0 to 2mm)  LCL - Varus: normal  Patella   Patellar Tracking: normal  J-Sign: none    Other   Muscle Tightness: hamstring tightness  Sensation: normal    Comments:  WWP foot    Left Knee Exam     Inspection   Scars: absent  Effusion: absent    Range of Motion   Extension: 0   Flexion: 120     Muscle Strength   Right Lower Extremity   Right quadriceps strength: Markedly decreased quad tone.   Left Lower Extremity   Left quadriceps strength: Moderately decreased quad tone.       IMAGING xrays of knees reviewed:  Severe medial compartment arthritis in bilateral knees    Assessment:     Encounter Diagnoses   Name Primary?    Primary osteoarthritis of right knee Yes    Acute pain of right knee     Chronic pain of right knee     Acquired varus deformity knee, right         Plan:       Patient would like to go to physical therapy for 1-2 visits to learn quad strengthening exercises to do at home    Recommend steroid injection to right knee; after explaining the pros and cons, risks and benefits, risk of infection, the patient would like to proceed with corticosteroid injection to the right knee    Prescription for cane to use while knee is painful to prevent limping    Importance of weight loss discussed with the patient    Consider medial load wrist and future if she desires, she would like to hold for now    Will follow-up PRN    Discussed long-term surgical options

## 2019-03-26 NOTE — PROCEDURES
Large Joint Aspiration/Injection: R knee  Date/Time: 3/26/2019 11:05 AM  Performed by: Cj Ingram MD  Authorized by: Cj Ingram MD     Consent Done?:  Yes (Verbal)  Indications:  Pain and diagnostic evaluation  Procedure site marked: Yes    Timeout: Prior to procedure the correct patient, procedure, and site was verified      Location:  Knee  Site:  R knee  Prep: Patient was prepped and draped in usual sterile fashion    Ultrasonic Guidance for needle placement: No  Needle size:  22 G  Approach:  Anterolateral  Medications:  80 mg methylPREDNISolone acetate 80 mg/mL  Patient tolerance:  Patient tolerated the procedure well with no immediate complications    Additional Comments: The patient had no adverse reactions to the medication. The patient was instructed to apply ice to the joint for 30 minutes on and 30 minutes off for the next 48 hours, and avoid strenuous activities for 48 hours following the injection. Patient was warned of possible blood sugar and/or blood pressure changes during that time regarding corticosteroid injections if applicable.  Following that time, patient can resume regular activities. Note that informed consent was performed with the patient before injection discussing risks, benefits, indications and alternatives to injection, risks including but not limited to bleeding and infection.

## 2019-03-26 NOTE — LETTER
March 26, 2019      [place]  [address] [place] [place] [address]           [facility]  [address] [facility] [address]  Phone: [phone]  Fax: [phone]          Patient: Rajni Melchor   MR Number: [id number]   YOB: 1950   Date of Visit: 3/26/2019       Dear Laila Rivas:    Thank you for referring Rajni Melchor to me for evaluation. Attached you will find relevant portions of my assessment and plan of care.    If you have questions, please do not hesitate to call me. I look forward to following Rajni Melchor along with you.    Sincerely,    Cj Ingram MD    Enclosure  CC:  No Recipients    If you would like to receive this communication electronically, please contact [email or web address] or [phone] to request more information on [employer] Link access.    For providers and/or their staff who would like to refer a patient to [facility], please contact us through our one-stop-shop provider referral line, [facility], at [phone].    If you feel you have received this communication in error or would no longer like to receive these types of communications, please e-mail [email or web address]

## 2019-03-27 ENCOUNTER — PATIENT MESSAGE (OUTPATIENT)
Dept: ORTHOPEDICS | Facility: CLINIC | Age: 69
End: 2019-03-27

## 2019-03-27 DIAGNOSIS — J30.89 NON-SEASONAL ALLERGIC RHINITIS DUE TO OTHER ALLERGIC TRIGGER: ICD-10-CM

## 2019-03-27 RX ORDER — FLUTICASONE PROPIONATE 50 MCG
2 SPRAY, SUSPENSION (ML) NASAL DAILY
Qty: 16 G | Refills: 3 | Status: SHIPPED | OUTPATIENT
Start: 2019-03-27 | End: 2019-12-04 | Stop reason: SDUPTHER

## 2019-04-01 ENCOUNTER — TELEPHONE (OUTPATIENT)
Dept: RHEUMATOLOGY | Facility: CLINIC | Age: 69
End: 2019-04-01

## 2019-04-01 NOTE — TELEPHONE ENCOUNTER
Spoke with pt and she states she already spoke with someone and rescheduled her appointment to 4.15.19

## 2019-04-01 NOTE — TELEPHONE ENCOUNTER
----- Message from Jo Mcdermott sent at 4/1/2019 10:04 AM CDT -----  Contact: [phone]  Would like to consult with the nurse, patients would like to reschedule, her appt, please call back at [phone], thanks sj

## 2019-04-15 ENCOUNTER — TELEPHONE (OUTPATIENT)
Dept: RHEUMATOLOGY | Facility: HOSPITAL | Age: 69
End: 2019-04-15

## 2019-04-15 NOTE — TELEPHONE ENCOUNTER
Spoke to pt  She is not ready to reschd her prolia  Anamika and lab  She will call when she is ready

## 2019-05-20 RX ORDER — ACYCLOVIR 400 MG/1
TABLET ORAL
Qty: 60 TABLET | Refills: 3 | Status: SHIPPED | OUTPATIENT
Start: 2019-05-20 | End: 2020-10-02

## 2019-06-08 ENCOUNTER — OFFICE VISIT (OUTPATIENT)
Dept: URGENT CARE | Facility: CLINIC | Age: 69
End: 2019-06-08
Payer: MEDICARE

## 2019-06-08 VITALS
BODY MASS INDEX: 32.51 KG/M2 | HEART RATE: 93 BPM | SYSTOLIC BLOOD PRESSURE: 120 MMHG | WEIGHT: 165.56 LBS | DIASTOLIC BLOOD PRESSURE: 60 MMHG | OXYGEN SATURATION: 95 % | TEMPERATURE: 97 F | HEIGHT: 60 IN

## 2019-06-08 DIAGNOSIS — R05.9 COUGH: ICD-10-CM

## 2019-06-08 DIAGNOSIS — J32.9 SINUSITIS, UNSPECIFIED CHRONICITY, UNSPECIFIED LOCATION: Primary | ICD-10-CM

## 2019-06-08 DIAGNOSIS — R09.82 PND (POST-NASAL DRIP): ICD-10-CM

## 2019-06-08 PROCEDURE — 99214 OFFICE O/P EST MOD 30 MIN: CPT | Mod: S$PBB,,, | Performed by: NURSE PRACTITIONER

## 2019-06-08 PROCEDURE — 99214 PR OFFICE/OUTPT VISIT, EST, LEVL IV, 30-39 MIN: ICD-10-PCS | Mod: S$PBB,,, | Performed by: NURSE PRACTITIONER

## 2019-06-08 PROCEDURE — 99214 OFFICE O/P EST MOD 30 MIN: CPT | Mod: PBBFAC,PO | Performed by: NURSE PRACTITIONER

## 2019-06-08 PROCEDURE — 99999 PR PBB SHADOW E&M-EST. PATIENT-LVL IV: CPT | Mod: PBBFAC,,, | Performed by: NURSE PRACTITIONER

## 2019-06-08 PROCEDURE — 99999 PR PBB SHADOW E&M-EST. PATIENT-LVL IV: ICD-10-PCS | Mod: PBBFAC,,, | Performed by: NURSE PRACTITIONER

## 2019-06-08 RX ORDER — DOXYCYCLINE 100 MG/1
100 CAPSULE ORAL EVERY 12 HOURS
Qty: 14 CAPSULE | Refills: 0 | Status: SHIPPED | OUTPATIENT
Start: 2019-06-08 | End: 2019-06-15

## 2019-06-08 RX ORDER — PROMETHAZINE HYDROCHLORIDE AND DEXTROMETHORPHAN HYDROBROMIDE 6.25; 15 MG/5ML; MG/5ML
5 SYRUP ORAL NIGHTLY PRN
Qty: 118 ML | Refills: 0 | Status: SHIPPED | OUTPATIENT
Start: 2019-06-08 | End: 2019-06-18

## 2019-06-08 NOTE — PROGRESS NOTES
Subjective:       Patient ID: Rajni Melchor is a 69 y.o. female.    Chief Complaint: Cough and Chest Congestion    Pt is a 69 year old female to clinic today with complaints of cough, green mucous, congestion, and myalgia that began 1 week ago. [place] and was given steroid injection and 3 days of prednisone.     Sinus Problem   This is a recurrent problem. The current episode started in the past 7 days. The problem has been gradually worsening since onset. There has been no fever. Her pain is at a severity of 4/10. The pain is mild. Associated symptoms include congestion, coughing, headaches, sinus pressure and a sore throat. Pertinent negatives include no chills, diaphoresis, ear pain, hoarse voice, neck pain, shortness of breath, sneezing or swollen glands. Treatments tried: prednisone, flonase, allegra. The treatment provided no relief.     Review of Systems   Constitutional: Negative for chills, diaphoresis, fatigue and fever.   HENT: Positive for congestion, postnasal drip, rhinorrhea, sinus pressure, sinus pain and sore throat. Negative for ear discharge, ear pain, hoarse voice, sneezing and trouble swallowing.    Respiratory: Positive for cough. Negative for chest tightness, shortness of breath and wheezing.    Cardiovascular: Negative for chest pain and palpitations.   Gastrointestinal: Negative for abdominal pain, diarrhea, nausea and vomiting.   Musculoskeletal: Positive for myalgias. Negative for back pain and neck pain.   Skin: Negative for rash.   Neurological: Positive for headaches. Negative for dizziness and light-headedness.       Objective:      Physical Exam   Constitutional: She is oriented to person, place, and time. She appears well-developed and well-nourished. No distress.   HENT:   Head: Normocephalic.   Right Ear: External ear and ear canal normal. No tenderness. Tympanic membrane is not bulging. A middle ear effusion is present.   Left Ear:  External ear and ear canal normal. No tenderness. Tympanic membrane is not bulging. A middle ear effusion is present.   Nose: Mucosal edema and rhinorrhea present. Right sinus exhibits maxillary sinus tenderness and frontal sinus tenderness. Left sinus exhibits maxillary sinus tenderness and frontal sinus tenderness.   Mouth/Throat: Uvula is midline, oropharynx is clear and moist and mucous membranes are normal. No oropharyngeal exudate, posterior oropharyngeal edema or posterior oropharyngeal erythema.   PND noted   Eyes: Pupils are equal, round, and reactive to light. Conjunctivae and EOM are normal.   Neck: Normal range of motion. Neck supple.   Cardiovascular: Normal rate, regular rhythm and normal heart sounds. Exam reveals no gallop and no friction rub.   No murmur heard.  Pulmonary/Chest: Effort normal and breath sounds normal. No accessory muscle usage or stridor. No apnea, no tachypnea and no bradypnea. No respiratory distress. She has no decreased breath sounds. She has no wheezes. She has no rhonchi. She has no rales.   Lymphadenopathy:        Head (right side): No submental, no submandibular and no tonsillar adenopathy present.        Head (left side): No submental, no submandibular and no tonsillar adenopathy present.     She has no cervical adenopathy.   Neurological: She is alert and oriented to person, place, and time.   Skin: Skin is warm and dry. No rash noted. She is not diaphoretic.   Psychiatric: She has a normal mood and affect. Her speech is normal and behavior is normal. Thought content normal.   Nursing note and vitals reviewed.      Assessment:       1. Sinusitis, unspecified chronicity, unspecified location    2. Cough    3. PND (post-nasal drip)        Plan:   Sinusitis, unspecified chronicity, unspecified location  -     doxycycline (VIBRAMYCIN) 100 MG Cap; Take 1 capsule (100 mg total) by mouth every 12 (twelve) hours. for 7 days  Dispense: 14 capsule; Refill: 0    Cough  -      promethazine-dextromethorphan (PROMETHAZINE-DM) 6.25-15 mg/5 mL Syrp; Take 5 mLs by mouth nightly as needed.  Dispense: 118 mL; Refill: 0    PND (post-nasal drip)      · Rest and increase fluids.   · May apply warm compresses as needed.   · Saline nasal spray or saline irrigation (Neti pot) to loosen nasal congestion.  · Flonase or Nasacort to reduce inflammation in the sinus cavities.  · Take antibiotics exactly as prescribed. Make sure to complete the entire course of antibiotics even if you start feeling better. This will prevent recurrence of your infection and bacterial resistance.   · Do not drive, drink alcohol, or take any other sedating medications or substances while taking cough syrup.   · Follow up with your primary care provider or with ENT if not improved within a few days or sooner for any new or worsening symptoms.   · Go to the ER for any fever that does not improve with Tylenol/Ibuprofen, neck stiffness, rash, severe headache, vision changes, shortness of breath, chest pain, severe facial pain or swelling, or for any other new and concerning symptoms.

## 2019-06-08 NOTE — PATIENT INSTRUCTIONS
Sinusitis (Antibiotic Treatment)    The sinuses are air-filled spaces within the bones of the face. They connect to the inside of the nose. Sinusitis is an inflammation of the tissue lining the sinus cavity. Sinus inflammation can occur during a cold. It can also be due to allergies to pollens and other particles in the air. Sinusitis can cause symptoms of sinus congestion and fullness. A sinus infection causes fever, headache and facial pain. There is often green or yellow drainage from the nose or into the back of the throat (post-nasal drip). You have been given antibiotics to treat this condition.  Home care:  · Take the full course of antibiotics as instructed. Do not stop taking them, even if you feel better.  · Drink plenty of water, hot tea, and other liquids. This may help thin mucus. It also may promote sinus drainage.  · Heat may help soothe painful areas of the face. Use a towel soaked in hot water. Or,  the shower and direct the hot spray onto your face. Using a vaporizer along with a menthol rub at night may also help.   · An expectorant containing guaifenesin may help thin the mucus and promote drainage from the sinuses.  · Over-the-counter decongestants may be used unless a similar medicine was prescribed. Nasal sprays work the fastest. Use one that contains phenylephrine or oxymetazoline. First blow the nose gently. Then use the spray. Do not use these medicines more often than directed on the label or symptoms may get worse. You may also use tablets containing pseudoephedrine. Avoid products that combine ingredients, because side effects may be increased. Read labels. You can also ask the pharmacist for help. (NOTE: Persons with high blood pressure should not use decongestants. They can raise blood pressure.)  · Over-the-counter antihistamines may help if allergies contributed to your sinusitis.    · Do not use nasal rinses or irrigation during an acute sinus infection, unless told to by  your health care provider. Rinsing may spread the infection to other sinuses.  · Use acetaminophen or ibuprofen to control pain, unless another pain medicine was prescribed. (If you have chronic liver or kidney disease or ever had a stomach ulcer, talk with your doctor before using these medicines. Aspirin should never be used in anyone under 18 years of age who is ill with a fever. It may cause severe liver damage.)  · Don't smoke. This can worsen symptoms.  Follow-up care  Follow up with your healthcare provider or our staff if you are not improving within the next week.  When to seek medical advice  Call your healthcare provider if any of these occur:  · Facial pain or headache becoming more severe  · Stiff neck  · Unusual drowsiness or confusion  · Swelling of the forehead or eyelids  · Vision problems, including blurred or double vision  · Fever of 100.4ºF (38ºC) or higher, or as directed by your healthcare provider  · Seizure  · Breathing problems  · Symptoms not resolving within 10 days  Date Last Reviewed: 4/13/2015  © 2579-8264 [employer]. [address] [place] [address], [place] [address]. All rights reserved. This information is not intended as a substitute for professional medical care. Always follow your healthcare professional's instructions.

## 2019-09-10 ENCOUNTER — PES CALL (OUTPATIENT)
Dept: ADMINISTRATIVE | Facility: CLINIC | Age: 69
End: 2019-09-10

## 2019-09-11 ENCOUNTER — PATIENT OUTREACH (OUTPATIENT)
Dept: ADMINISTRATIVE | Facility: HOSPITAL | Age: 69
End: 2019-09-11

## 2019-10-14 ENCOUNTER — OFFICE VISIT (OUTPATIENT)
Dept: INTERNAL MEDICINE | Facility: CLINIC | Age: 69
End: 2019-10-14
Payer: MEDICARE

## 2019-10-14 VITALS
DIASTOLIC BLOOD PRESSURE: 78 MMHG | WEIGHT: 162.69 LBS | OXYGEN SATURATION: 98 % | BODY MASS INDEX: 31.94 KG/M2 | TEMPERATURE: 98 F | HEART RATE: 66 BPM | HEIGHT: 60 IN | SYSTOLIC BLOOD PRESSURE: 118 MMHG

## 2019-10-14 DIAGNOSIS — Z12.39 SCREENING FOR BREAST CANCER: ICD-10-CM

## 2019-10-14 DIAGNOSIS — E03.9 HYPOTHYROIDISM, UNSPECIFIED TYPE: ICD-10-CM

## 2019-10-14 DIAGNOSIS — E78.5 HYPERLIPIDEMIA, UNSPECIFIED HYPERLIPIDEMIA TYPE: ICD-10-CM

## 2019-10-14 DIAGNOSIS — M46.1 SACROILIITIS: ICD-10-CM

## 2019-10-14 DIAGNOSIS — J30.89 NON-SEASONAL ALLERGIC RHINITIS DUE TO OTHER ALLERGIC TRIGGER: ICD-10-CM

## 2019-10-14 DIAGNOSIS — M47.816 LUMBAR SPONDYLOSIS: ICD-10-CM

## 2019-10-14 DIAGNOSIS — E55.9 VITAMIN D DEFICIENCY DISEASE: ICD-10-CM

## 2019-10-14 DIAGNOSIS — Z86.010 HISTORY OF COLON POLYPS: ICD-10-CM

## 2019-10-14 DIAGNOSIS — Z00.00 ENCOUNTER FOR PREVENTIVE HEALTH EXAMINATION: Primary | ICD-10-CM

## 2019-10-14 DIAGNOSIS — M81.0 AGE-RELATED OSTEOPOROSIS WITHOUT CURRENT PATHOLOGICAL FRACTURE: ICD-10-CM

## 2019-10-14 DIAGNOSIS — Z12.31 ENCOUNTER FOR SCREENING MAMMOGRAM FOR MALIGNANT NEOPLASM OF BREAST: ICD-10-CM

## 2019-10-14 PROCEDURE — G0439 PR MEDICARE ANNUAL WELLNESS SUBSEQUENT VISIT: ICD-10-PCS | Mod: S$GLB,,, | Performed by: NURSE PRACTITIONER

## 2019-10-14 PROCEDURE — G0439 PPPS, SUBSEQ VISIT: HCPCS | Mod: S$GLB,,, | Performed by: NURSE PRACTITIONER

## 2019-10-14 PROCEDURE — 99999 PR PBB SHADOW E&M-EST. PATIENT-LVL V: ICD-10-PCS | Mod: PBBFAC,,, | Performed by: NURSE PRACTITIONER

## 2019-10-14 PROCEDURE — 99999 PR PBB SHADOW E&M-EST. PATIENT-LVL V: CPT | Mod: PBBFAC,,, | Performed by: NURSE PRACTITIONER

## 2019-10-14 PROCEDURE — 99215 OFFICE O/P EST HI 40 MIN: CPT | Mod: PBBFAC | Performed by: NURSE PRACTITIONER

## 2019-10-14 RX ORDER — LEVOTHYROXINE SODIUM 50 UG/1
TABLET ORAL
COMMUNITY
Start: 2014-09-14 | End: 2020-01-28

## 2019-10-14 NOTE — PROGRESS NOTES
"Rajni Melchor presented for a  Medicare AWV and comprehensive Health Risk Assessment today. The following components were reviewed and updated:    · Medical history  · Family History  · Social history  · Allergies and Current Medications  · Health Risk Assessment  · Health Maintenance  · Care Team     ** See Completed Assessments for Annual Wellness Visit within the encounter summary.**       The following assessments were completed:  · Living Situation  · CAGE  · Depression Screening  · Timed Get Up and Go  · Whisper Test  · Cognitive Function Screening  · Nutrition Screening  · ADL Screening  · PAQ Screening    Vitals:    10/14/19 0821   BP: 118/78   BP Location: Left arm   Patient Position: Sitting   BP Method: Large (Manual)   Pulse: 66   Temp: 97.9 °F (36.6 °C)   TempSrc: Oral   SpO2: 98%   Weight: 73.8 kg (162 lb 11.2 oz)   Height: 4' 11.5" (1.511 m)     Body mass index is 32.31 kg/m².  Physical Exam   Constitutional: She is oriented to person, place, and time. Vital signs are normal. She appears well-developed and well-nourished.   HENT:   Head: Normocephalic and atraumatic.   Neck: Normal range of motion.   Cardiovascular: Normal rate and regular rhythm.   Pulses:       Radial pulses are 2+ on the right side, and 2+ on the left side.   Pulmonary/Chest: Effort normal and breath sounds normal.   Neurological: She is alert and oriented to person, place, and time.   Skin: Skin is warm.   Psychiatric: She has a normal mood and affect. Her behavior is normal.             Diagnoses and health risks identified today and associated recommendations/orders:    1. Encounter for preventive health examination  Due for annual with PCP and screening to update health maintenance.  Will schedule.    2. Lumbar spondylosis  Stable with intermittent symptoms.  Will continue current treatment plan.     3. Hyperlipidemia, unspecified hyperlipidemia type  Stable.  Will continue current treatment plan.     Component      Latest " Ref Rng & Units 3/27/2018 2/20/2017   Cholesterol      120 - 199 mg/dL 186 199   Triglycerides      30 - 150 mg/dL 48 52   HDL      40 - 75 mg/dL 88 (H) 83 (H)   LDL Cholesterol External      63.0 - 159.0 mg/dL 88.4 105.6   Hdl/Cholesterol Ratio      20.0 - 50.0 % 47.3 41.7   Total Cholesterol/HDL Ratio      2.0 - 5.0 2.1 2.4   Non-HDL Cholesterol      mg/dL 98 116     4. Hypothyroidism, unspecified type  Stable.  Will continue current treatment plan.     Component      Latest Ref Rng & Units 3/27/2018 11/7/2017   TSH      0.400 - 4.000 uIU/mL 2.692 1.878     5. Vitamin D deficiency disease  Stable.  Will continue current treatment plan.     Component      Latest Ref Rng & Units 3/27/2018 8/15/2017   Vit D, 25-Hydroxy      30 - 96 ng/mL 37 24 (L)     6. History of colon polyps  Due for screening.   - Case request GI: COLONOSCOPY    7. Age-related osteoporosis without current pathological fracture  Stable.  Holding off on Prolia for now.  Wanting to do another DEXA before another dose.    - DXA Bone Density Spine And Hip; Future    8. Sacroiliitis  Stable.  Will continue current treatment plan.     9. Non-seasonal allergic rhinitis due to other allergic trigger  Stable.  Will continue current treatment plan.     10. Screening for breast cancer  Due for screening.  - Mammo Digital Screening Bilat without CA; Future    11. Encounter for screening mammogram for malignant neoplasm of breast     - Mammo Digital Screening Bilat without CA; Future      Provided Rajni with a 5-10 year written screening schedule and personal prevention plan. Recommendations were developed using the USPSTF age appropriate recommendations. Education, counseling, and referrals were provided as needed. After Visit Summary printed and given to patient which includes a list of additional screenings\tests needed.    No follow-ups on file.    Nya Cantu NP

## 2019-10-14 NOTE — PATIENT INSTRUCTIONS
Counseling and Referral of Other Preventative  (Italic type indicates deductible and co-insurance are waived)    Patient Name: Rajni Melchor  Today's Date: 10/14/2019    Health Maintenance       Date Due Completion Date    Shingles Vaccine (2 of 3) 08/13/2012 6/18/2012    Colonoscopy 05/05/2019 5/5/2016    Override on 3/21/2006: Done    DEXA SCAN 08/15/2019 8/15/2017    Override on 8/15/2011: Done (REPEAT 2 YRS)    Influenza Vaccine (1) 09/01/2019 10/1/2018    Mammogram 10/08/2019 10/8/2018    Override on 8/15/2011: Done    TETANUS VACCINE 06/18/2022 6/18/2012    Lipid Panel 03/27/2023 3/27/2018        No orders of the defined types were placed in this encounter.    The following information is provided to all patients.  This information is to help you find resources for any of the problems found today that may be affecting your health:                Living healthy guide: www.[facility].[place].[facility]      Understanding Diabetes: www.diabetes.org      Eating healthy: www.cdc.gov/healthyweight      [facility] home safety checklist: www.cdc.gov/steadi/patient.html      Agency on Aging: www.goea.[place].gov      Alcoholics anonymous (AA): www.aa.org      Physical Activity: www.[place].nih.gov/hc7jwld      Tobacco use: www.quitwithusla.org

## 2019-10-15 ENCOUNTER — TELEPHONE (OUTPATIENT)
Dept: ENDOSCOPY | Facility: HOSPITAL | Age: 69
End: 2019-10-15

## 2019-10-15 NOTE — TELEPHONE ENCOUNTER
Endoscopy Scheduling Questionnaire:    1. Have you been admitted overnight to the hospital in the past 3 months? no  2. Have you had a cardiac stent placed in the past 12 months? no  3. Have you had a stroke or heart attack in the past 6 months? no  4. Have you had any chest pain in the past 3 months? no      If so, have you been evaluated by your PCP or Cardiologist? no  5. Do you take prescription weight loss medication? no  6. Have you been diagnosed with Diverticulitis within the past 3 months? no  7. Are you on dialysis? no  8. Are you diabetic? no Do you have an insulin pump? no  9. Do you have any other health issues that you feel might limit your ability to safely have the procedure and/or sedation? no  10. Is the patient over 79 yo? no        If so, has the patient been seen by their PCP or GI in the last 6 months? N/A       -I have reviewed the last colonoscopy for recommendations regarding surveillance and bowel prep  Yes  -I have reviewed the patient's medications and allergies. She is not on high risk medication, A clearance request NA  -I have verified the pharmacy information. The prep being used is Miralax. The patient's prep instructions were sent via [facility] patient portal..    Date Endoscopy Scheduled: Date: [place]

## 2019-10-28 NOTE — PRE-PROCEDURE INSTRUCTIONS
Spoke with patient regarding bowel prep and instructions. Patient says that she has rec'd her instruction and states understanding of procedure. Miralax prep reviewed with patient. Callback number provided. 2nd prep time given. Someone from the [facility] will call 1 day prior to procedure with final arrival time.

## 2019-11-07 ENCOUNTER — HOSPITAL ENCOUNTER (OUTPATIENT)
Dept: RADIOLOGY | Facility: HOSPITAL | Age: 69
Discharge: HOME | End: 2019-11-07
Attending: NURSE PRACTITIONER
Payer: MEDICARE

## 2019-11-07 VITALS — HEIGHT: 59 IN | BODY MASS INDEX: 32.85 KG/M2 | WEIGHT: 162.94 LBS

## 2019-11-07 DIAGNOSIS — Z12.31 ENCOUNTER FOR SCREENING MAMMOGRAM FOR MALIGNANT NEOPLASM OF BREAST: ICD-10-CM

## 2019-11-07 DIAGNOSIS — Z12.39 SCREENING FOR BREAST CANCER: ICD-10-CM

## 2019-11-07 PROCEDURE — 77067 SCR MAMMO BI INCL CAD: CPT | Mod: 26,,, | Performed by: RADIOLOGY

## 2019-11-07 PROCEDURE — 77067 MAMMO DIGITAL SCREENING BILAT WITH TOMOSYNTHESIS_CAD: ICD-10-PCS | Mod: 26,,, | Performed by: RADIOLOGY

## 2019-11-07 PROCEDURE — 77063 BREAST TOMOSYNTHESIS BI: CPT | Mod: 26,,, | Performed by: RADIOLOGY

## 2019-11-07 PROCEDURE — 77067 SCR MAMMO BI INCL CAD: CPT | Mod: TC

## 2019-11-07 PROCEDURE — 77063 MAMMO DIGITAL SCREENING BILAT WITH TOMOSYNTHESIS_CAD: ICD-10-PCS | Mod: 26,,, | Performed by: RADIOLOGY

## 2019-11-07 NOTE — PRE-PROCEDURE INSTRUCTIONS
72 hr follow-call completed with patient. Pt denies any concerns or questions and pt is prepared to go forth with procedure. OTC medications have not yet been purchased but pt understands bowel prep instructions. Tentative arrival set for 1200. Will call 1 day prior with final arrival time.

## 2019-11-11 ENCOUNTER — ANESTHESIA EVENT (OUTPATIENT)
Dept: ENDOSCOPY | Facility: HOSPITAL | Age: 69
End: 2019-11-11
Payer: MEDICARE

## 2019-11-11 NOTE — PRE-PROCEDURE INSTRUCTIONS
Pt called and offered sooner appointment time, pt accepts. New arrival time for 1030, reviewed with patient bowel prep instructions also.

## 2019-11-11 NOTE — ANESTHESIA PREPROCEDURE EVALUATION
11/11/2019  Rajni Melchor is a 69 y.o., female.    Anesthesia Evaluation    I have reviewed the Patient Summary Reports.    I have reviewed the Nursing Notes.   I have reviewed the Medications.     Review of Systems  Anesthesia Hx:  No problems with previous Anesthesia  Denies Family Hx of Anesthesia complications.   Denies Personal Hx of Anesthesia complications.   Social:  Former Smoker, Social Alcohol Use    Hematology/Oncology:  Hematology Normal        Cardiovascular:   hyperlipidemia ECG has been reviewed.    Pulmonary:  Pulmonary Normal    Renal/:  Renal/ Normal     Hepatic/GI:  Hepatic/GI Normal    Musculoskeletal:   Arthritis     Neurological:  Neurology Normal    Endocrine:   Hypothyroidism    Psych:  Psychiatric Normal           Physical Exam  General:  Obesity    Airway/Jaw/Neck:  Airway Findings: Mouth Opening: Small, but > 3cm Tongue: Normal  General Airway Assessment: Adult  Mallampati: II  TM Distance: Normal, at least 6 cm  Jaw/Neck Findings:  Neck ROM: Normal ROM  Neck Findings:     Eyes/Ears/Nose:  Eyes/Ears/Nose Findings:    Dental:  Dental Findings: In tact, Upper Dentures, Lower partial dentures   Chest/Lungs:  Chest/Lungs Findings: Clear to auscultation, Normal Respiratory Rate     Heart/Vascular:  Heart Findings: Rate: Normal  Rhythm: Regular Rhythm  Sounds: Normal  Heart murmur: negative Vascular Findings: Normal    Abdomen:  Abdomen Findings: Normal    Musculoskeletal:  Musculoskeletal Findings: Normal   Skin:  Skin Findings: Normal    Mental Status:  Mental Status Findings:  Alert and Oriented         Anesthesia Plan  Type of Anesthesia, risks & benefits discussed:  Anesthesia Type:  general  Patient's Preference:   Intra-op Monitoring Plan: standard ASA monitors  Intra-op Monitoring Plan Comments:   Post Op Pain Control Plan: per primary service following discharge from  PACU  Post Op Pain Control Plan Comments:   Induction:   IV  Beta Blocker:  Patient is not currently on a Beta-Blocker (No further documentation required).       Informed Consent: Patient understands risks and agrees with Anesthesia plan.  Questions answered. Anesthesia consent signed with patient.  ASA Score: 2     Day of Surgery Review of History & Physical:    H&P update referred to the surgeon.         Ready For Surgery From Anesthesia Perspective.

## 2019-11-12 ENCOUNTER — HOSPITAL ENCOUNTER (OUTPATIENT)
Facility: HOSPITAL | Age: 69
Discharge: HOME | End: 2019-11-12
Attending: INTERNAL MEDICINE | Admitting: INTERNAL MEDICINE
Payer: MEDICARE

## 2019-11-12 ENCOUNTER — ANESTHESIA (OUTPATIENT)
Dept: ENDOSCOPY | Facility: HOSPITAL | Age: 69
End: 2019-11-12
Payer: MEDICARE

## 2019-11-12 VITALS
BODY MASS INDEX: 32.58 KG/M2 | OXYGEN SATURATION: 97 % | RESPIRATION RATE: 15 BRPM | TEMPERATURE: 98 F | HEIGHT: 59 IN | DIASTOLIC BLOOD PRESSURE: 63 MMHG | SYSTOLIC BLOOD PRESSURE: 114 MMHG | HEART RATE: 66 BPM | WEIGHT: 161.63 LBS

## 2019-11-12 DIAGNOSIS — Z12.11 COLON CANCER SCREENING: Primary | ICD-10-CM

## 2019-11-12 PROCEDURE — 88305 TISSUE EXAM BY PATHOLOGIST: CPT | Mod: 26,,, | Performed by: PATHOLOGY

## 2019-11-12 PROCEDURE — D9220A PRA ANESTHESIA: Mod: PT,ANES,, | Performed by: ANESTHESIOLOGY

## 2019-11-12 PROCEDURE — 45385 COLONOSCOPY W/LESION REMOVAL: CPT | Mod: PT,,, | Performed by: INTERNAL MEDICINE

## 2019-11-12 PROCEDURE — 27201089 HC SNARE, DISP (ANY): Performed by: INTERNAL MEDICINE

## 2019-11-12 PROCEDURE — D9220A PRA ANESTHESIA: Mod: PT,CRNA,, | Performed by: NURSE ANESTHETIST, CERTIFIED REGISTERED

## 2019-11-12 PROCEDURE — 63600175 PHARM REV CODE 636 W HCPCS: Performed by: INTERNAL MEDICINE

## 2019-11-12 PROCEDURE — D9220A PRA ANESTHESIA: ICD-10-PCS | Mod: PT,CRNA,, | Performed by: NURSE ANESTHETIST, CERTIFIED REGISTERED

## 2019-11-12 PROCEDURE — 88305 TISSUE EXAM BY PATHOLOGIST: ICD-10-PCS | Mod: 26,,, | Performed by: PATHOLOGY

## 2019-11-12 PROCEDURE — 88305 TISSUE EXAM BY PATHOLOGIST: CPT | Performed by: PATHOLOGY

## 2019-11-12 PROCEDURE — D9220A PRA ANESTHESIA: ICD-10-PCS | Mod: PT,ANES,, | Performed by: ANESTHESIOLOGY

## 2019-11-12 PROCEDURE — 45385 COLONOSCOPY W/LESION REMOVAL: CPT | Performed by: INTERNAL MEDICINE

## 2019-11-12 PROCEDURE — 63600175 PHARM REV CODE 636 W HCPCS: Performed by: NURSE ANESTHETIST, CERTIFIED REGISTERED

## 2019-11-12 PROCEDURE — 37000008 HC ANESTHESIA 1ST 15 MINUTES: Performed by: INTERNAL MEDICINE

## 2019-11-12 PROCEDURE — 45385 PR COLONOSCOPY,REMV LESN,SNARE: ICD-10-PCS | Mod: PT,,, | Performed by: INTERNAL MEDICINE

## 2019-11-12 PROCEDURE — 37000009 HC ANESTHESIA EA ADD 15 MINS: Performed by: INTERNAL MEDICINE

## 2019-11-12 RX ORDER — SODIUM CHLORIDE, SODIUM LACTATE, POTASSIUM CHLORIDE, CALCIUM CHLORIDE 600; 310; 30; 20 MG/100ML; MG/100ML; MG/100ML; MG/100ML
INJECTION, SOLUTION INTRAVENOUS CONTINUOUS
Status: DISCONTINUED | OUTPATIENT
Start: 2019-11-12 | End: 2019-11-12 | Stop reason: HOSPADM

## 2019-11-12 RX ORDER — LIDOCAINE HCL/PF 100 MG/5ML
SYRINGE (ML) INTRAVENOUS
Status: DISCONTINUED | OUTPATIENT
Start: 2019-11-12 | End: 2019-11-12

## 2019-11-12 RX ORDER — LIDOCAINE HYDROCHLORIDE 10 MG/ML
1 INJECTION, SOLUTION EPIDURAL; INFILTRATION; INTRACAUDAL; PERINEURAL ONCE
Status: DISCONTINUED | OUTPATIENT
Start: 2019-11-12 | End: 2019-11-12 | Stop reason: HOSPADM

## 2019-11-12 RX ORDER — PROPOFOL 10 MG/ML
VIAL (ML) INTRAVENOUS
Status: DISCONTINUED | OUTPATIENT
Start: 2019-11-12 | End: 2019-11-12

## 2019-11-12 RX ADMIN — PROPOFOL 50 MG: 10 INJECTION, EMULSION INTRAVENOUS at 11:11

## 2019-11-12 RX ADMIN — PROPOFOL 50 MG: 10 INJECTION, EMULSION INTRAVENOUS at 10:11

## 2019-11-12 RX ADMIN — SODIUM CHLORIDE, SODIUM LACTATE, POTASSIUM CHLORIDE, AND CALCIUM CHLORIDE: 600; 310; 30; 20 INJECTION, SOLUTION INTRAVENOUS at 10:11

## 2019-11-12 RX ADMIN — PROPOFOL 100 MG: 10 INJECTION, EMULSION INTRAVENOUS at 10:11

## 2019-11-12 RX ADMIN — LIDOCAINE HYDROCHLORIDE 50 MG: 20 INJECTION, SOLUTION INTRAVENOUS at 10:11

## 2019-11-12 NOTE — ANESTHESIA POSTPROCEDURE EVALUATION
Anesthesia Post Evaluation    Patient: Rajni Melchor    Procedure(s) Performed: Procedure(s) (LRB):  COLONOSCOPY (N/A)    Final Anesthesia Type: general  Patient location during evaluation: PACU  Patient participation: Yes- Able to Participate  Level of consciousness: awake and alert and oriented  Post-procedure vital signs: reviewed and stable  Pain management: adequate  Airway patency: patent  PONV status at discharge: No PONV  Anesthetic complications: no      Cardiovascular status: blood pressure returned to baseline, stable and hemodynamically stable  Respiratory status: unassisted  Hydration status: euvolemic  Follow-up not needed.          Vitals Value Taken Time   /63 11/12/2019 11:39 AM   Temp 36.6 °C (97.8 °F) 11/12/2019 11:14 AM   Pulse 66 11/12/2019 11:39 AM   Resp 15 11/12/2019 11:39 AM   SpO2 97 % 11/12/2019 11:39 AM         Event Time     Out of Recovery 11:39:00          Pain/Ross Score: Ross Score: 10 (11/12/2019 11:39 AM)

## 2019-11-12 NOTE — TRANSFER OF CARE
"Anesthesia Transfer of Care Note    Patient: Rajni Melchor    Procedure(s) Performed: Procedure(s) (LRB):  COLONOSCOPY (N/A)    Patient location: PACU    Anesthesia Type: general    Transport from OR: Transported from OR on room air with adequate spontaneous ventilation    Post pain: adequate analgesia    Post assessment: no apparent anesthetic complications    Post vital signs: stable    Level of consciousness: awake, alert and oriented    Nausea/Vomiting: no nausea/vomiting    Complications: none    Transfer of care protocol was followed      Last vitals:   Visit Vitals  BP (!) 142/73   Pulse 75   Temp 36.6 °C (97.8 °F)   Resp 14   Ht 4' 11" (1.499 m)   Wt 73.3 kg (161 lb 9.6 oz)   SpO2 100%   Breastfeeding? No   BMI 32.64 kg/m²     "

## 2019-11-12 NOTE — [employer] PATIENT INSTRUCTIONS
Discharge Summary/Instructions after an Endoscopic Procedure  Patient Name: Rajni Melchor  Patient MRN: [id number]  Patient YOB: 1950  Tuesday, November 12, 2019  Latonya Adamson MD  RESTRICTIONS:  During your procedure today, you received medications for sedation.  These   medications may affect your judgment, balance and coordination.  Therefore,   for 24 hours, you have the following restrictions:   - DO NOT drive a car, operate machinery, make legal/financial decisions,   sign important papers or drink alcohol.    ACTIVITY:  Today: no heavy lifting, straining or running due to procedural   sedation/anesthesia.  The following day: return to full activity including work.  DIET:  Eat and drink normally unless instructed otherwise.     TREATMENT FOR COMMON SIDE EFFECTS:  - Mild abdominal pain, nausea, belching, bloating or excessive gas:  rest,   eat lightly and use a heating pad.  - Sore Throat: treat with throat lozenges and/or gargle with warm salt   water.  - Because air was used during the procedure, expelling large amounts of air   from your rectum or belching is normal.  - If a bowel prep was taken, you may not have a bowel movement for 1-3 days.    This is normal.  SYMPTOMS TO WATCH FOR AND REPORT TO YOUR PHYSICIAN:  1. Abdominal pain or bloating, other than gas cramps.  2. Chest pain.  3. Back pain.  4. Signs of infection such as: chills or fever occurring within 24 hours   after the procedure.  5. Rectal bleeding, which would show as bright red, maroon, or black stools.   (A tablespoon of blood from the rectum is not serious, especially if   hemorrhoids are present.)  6. Vomiting.  7. Weakness or dizziness.  GO DIRECTLY TO THE NEAREST EMERGENCY ROOM IF YOU HAVE ANY OF THE FOLLOWING:      Difficulty breathing              Chills and/or fever over 101 F   Persistent vomiting and/or vomiting blood   Severe abdominal pain   Severe chest pain   Black, tarry stools   Bleeding- more than one  tablespoon   Any other symptom or condition that you feel may need urgent attention  Your doctor recommends these additional instructions:  If any biopsies were taken, your doctors clinic will contact you in 1 to 2   weeks with any results.  - Patient has a contact number available for emergencies.  The signs and   symptoms of potential delayed complications were discussed with the   patient.  Return to normal activities tomorrow.  Written discharge   instructions were provided to the patient.   - Discharge patient to home (via wheelchair).   - Resume previous diet today.   - Continue present medications.   - Await pathology results.   - No aspirin, ibuprofen, naproxen, or other non-steroidal anti-inflammatory   drugs for 7 days after polyp removal.   - Repeat colonoscopy in 5 years for surveillance.  For questions, problems or results please call your physician Latonya Adamson MD at Work:  [phone]  If you have any questions about the above instructions, call the GI   department at [phone] or call the endoscopy unit at [phone]   from 7am until 3 pm.  [facility], EMERGENCY ROOM PHONE NUMBER:   [phone]  IF A COMPLICATION OR EMERGENCY SITUATION ARISES AND YOU ARE UNABLE TO REACH   YOUR PHYSICIAN - GO DIRECTLY TO THE EMERGENCY ROOM.  I have read or have had read to me these discharge instructions for my   procedure and have received a written copy.  I understand these   instructions and will follow-up with my physician if I have any questions.     __________________________________       _____________________________________  Nurse Signature                                          Patient/Designated   Responsible Party Signature  MD Latonya Gambino MD  11/12/2019 11:14:02 AM  This report has been verified and signed electronically.  [employer]

## 2019-11-12 NOTE — DISCHARGE SUMMARY
Endoscopy Discharge Summary      Admit Date: 11/12/2019    Discharge Date and Time:  11/12/2019 11:10 AM    Attending Physician: Latonya Adamson MD     Discharge Physician: Latonya Adamson MD     Principal Admitting Diagnoses: Colon cancer screening         Discharge Diagnosis: The encounter diagnosis was Colon cancer screening.     Discharged Condition: Good    Indication for Admission: Colon cancer screening     Hospital Course: Patient was admitted for an inpatient procedure and tolerated the procedure well with no complications.    Significant Diagnostic Studies: Colonoscopy with polypectomy    Pathology (if any):  Specimen (12h ago, onward)    None          Estimated Blood Loss: 1 ml.    Discussed with: patient.    Disposition: Home.    Follow Up/Patient Instructions:   Current Discharge Medication List      CONTINUE these medications which have NOT CHANGED    Details   acyclovir (ZOVIRAX) 400 MG tablet TAKE ONE TABLET BY MOUTH 5 TIMES A DAY FOR 3 DAYS FOR FEVER BLISTERS.  Qty: 60 tablet, Refills: 3      calcium-magnesium-zinc 333-133-5 mg Tab Take 1,200 mg by mouth.      cholecalciferol, vitamin D3, (VITAMIN D3) 2,000 unit Cap Take 1 capsule (2,000 Units total) by mouth once daily.  Qty: 100 capsule, Refills: 3      fexofenadine (ALLEGRA) 180 MG tablet Take 180 mg by mouth.       fluticasone (FLONASE) 50 mcg/actuation nasal spray 2 sprays (100 mcg total) by Each Nare route once daily.  Qty: 16 g, Refills: 3    Associated Diagnoses: Non-seasonal allergic rhinitis due to other allergic trigger      ibuprofen (ADVIL,MOTRIN) 800 MG tablet Take 1 tablet (800 mg total) by mouth 2 (two) times daily as needed.  Qty: 60 tablet, Refills: 1    Associated Diagnoses: Sacroiliitis; DDD (degenerative disc disease), lumbar; Spondylosis of lumbar region without myelopathy or radiculopathy      !! levothyroxine (SYNTHROID) 50 MCG tablet Take by mouth.      !! LEVOXYL 50 mcg tablet TAKE 1 TABLET DAILY  Qty: 90 tablet,  Refills: 3       !! - Potential duplicate medications found. Please discuss with provider.          Discharge Procedure Orders   Diet general     Call MD for:  temperature >100.4     Call MD for:  persistent nausea and vomiting     Call MD for:  severe uncontrolled pain     Call MD for:  difficulty breathing, headache or visual disturbances     Activity as tolerated       Follow-up Information     Latonya Adamson MD. Call in 1 week.    Specialty:  Gastroenterology  Why:  For pathology results  Contact information:  [address]  [place] [address] [phone]

## 2019-11-12 NOTE — H&P
PRE PROCEDURE H&P    Patient Name: Rajni Melchor  MRN: [id number]  : 1950  Date of Procedure:  2019  Referring Physician: Nya Cantu NP  Primary Physician: Kylah Benedict MD  Procedure Physician: Latonya Adamson MD       Planned Procedure: Colonoscopy  Diagnosis: previous adenomatous polyp  Chief Complaint: Same as above    HPI: Patient is an 69 y.o. female is here for the above.     Last colonoscopy: 3 years ago   Family history: uncle   Anticoagulation: none     Past Medical History:   Past Medical History:   Diagnosis Date    Allergic rhinitis     Arthritis     Cancer     Skin Cancer-forehead     History of colon polyps 2016    Hyperlipidemia     Hypothyroidism     Osteopenia     Osteoporosis     S/P nasal septoplasty     Vitamin D deficiency disease         Past Surgical History:  Past Surgical History:   Procedure Laterality Date    APPENDECTOMY      bitubal ligation      COLONOSCOPY N/A 2016    Procedure: COLONOSCOPY;  Surgeon: Jorge Hampton MD;  Location: [facility];  Service: Endoscopy;  Laterality: N/A;    metal armida in right arm      NASAL SEPTOPLASTY W/ TURBINOPLASTY      SHOULDER SURGERY      steel plate in right shoulder,     WRIST SURGERY          Home Medications:  Prior to Admission medications    Medication Sig Start Date End Date Taking? Authorizing Provider   acyclovir (ZOVIRAX) 400 MG tablet TAKE ONE TABLET BY MOUTH 5 TIMES A DAY FOR 3 DAYS FOR FEVER BLISTERS. 19   Kylah Benedict MD   calcium-magnesium-zinc 333-133-5 mg Tab Take 1,200 mg by mouth.    Historical Provider, MD   cholecalciferol, vitamin D3, (VITAMIN D3) 2,000 unit Cap Take 1 capsule (2,000 Units total) by mouth once daily. 3/27/17   Kylah Benedict MD   fexofenadine (ALLEGRA) 180 MG tablet Take 180 mg by mouth.  07   Historical Provider, MD   fluticasone (FLONASE) 50 mcg/actuation nasal spray 2 sprays (100 mcg total) by Each Nare route once daily. 3/27/19   Kylah QUINN  MD Marichuy   ibuprofen (ADVIL,MOTRIN) 800 MG tablet Take 1 tablet (800 mg total) by mouth 2 (two) times daily as needed. 17   Chana Godinez MD   levothyroxine (SYNTHROID) 50 MCG tablet Take by mouth. 14   Historical Provider, MD   LEVOXYL 50 mcg tablet TAKE 1 TABLET DAILY 19   Kylah Benedict MD        Allergies:  Review of patient's allergies indicates:   Allergen Reactions    Augmentin [amoxicillin-pot clavulanate] Nausea And Vomiting    Adhesive Rash     Blastic band aid        Social History:   Social History     Socioeconomic History    Marital status:      Spouse name: Not on file    Number of children: 3    Years of education: Not on file    Highest education level: Not on file   Occupational History    Occupation: retired   Social Needs    Financial resource strain: Not on file    Food insecurity:     Worry: Not on file     Inability: Not on file    Transportation needs:     Medical: Not on file     Non-medical: Not on file   Tobacco Use    Smoking status: Former Smoker     Packs/day: 2.00     Years: 16.00     Pack years: 32.00     Last attempt to quit: 1988     Years since quittin.5    Smokeless tobacco: Never Used   Substance and Sexual Activity    Alcohol use: Yes     Comment: socially    Drug use: No    Sexual activity: Yes     Partners: Male   Lifestyle    Physical activity:     Days per week: Not on file     Minutes per session: Not on file    Stress: Not on file   Relationships    Social connections:     Talks on phone: Not on file     Gets together: Not on file     Attends Worship service: Not on file     Active member of club or organization: Not on file     Attends meetings of clubs or organizations: Not on file     Relationship status: Not on file   Other Topics Concern    Are you pregnant or think you may be? Not Asked    Breast-feeding Not Asked   Social History Narrative    Not on file       Family History:  Family History   Problem  "Relation Age of Onset    Diabetes Sister     Pacemaker/defibrilator Sister     Diabetes Mother     Hypertension Mother     Cancer Father         liver and bladder    Melanoma Maternal Uncle     Colon cancer Maternal Uncle     Breast cancer Cousin     Breast cancer Other     Breast cancer Other     Psoriasis Neg Hx     Lupus Neg Hx     Eczema Neg Hx        ROS: No acute cardiac events, no acute respiratory complaints.     Physical Exam (all patients):    BP (!) 142/73   Pulse 75   Temp 97.8 °F (36.6 °C)   Resp 14   Ht 4' 11" (1.499 m)   Wt 73.3 kg (161 lb 9.6 oz)   SpO2 100%   Breastfeeding? No   BMI 32.64 kg/m²   Lungs: Clear to auscultation bilaterally, respirations unlabored  Heart: Regular rate and rhythm, S1 and S2 normal, no obvious murmurs  Abdomen:         Soft, non-tender, bowel sounds normal, no masses, no organomegaly    Lab Results   Component Value Date    WBC 5.34 03/27/2018    MCV 97 03/27/2018    RDW 12.6 03/27/2018     03/27/2018    GLU 99 09/28/2018    HGBA1C 5.5 01/23/2013    BUN 10 09/28/2018     09/28/2018    K 4.4 09/28/2018     09/28/2018        SEDATION PLAN: per anesthesia      History reviewed, vital signs satisfactory, cardiopulmonary status satisfactory, sedation options, risks and plans have been discussed with the patient  All their questions were answered and the patient agrees to the sedation procedures as planned and the patient is deemed an appropriate candidate for the sedation as planned.    Procedure explained to patient, informed consent obtained and placed in chart.    Latonya Adamson  11/12/2019  10:49 AM   "

## 2019-11-12 NOTE — DISCHARGE INSTRUCTIONS

## 2019-12-02 ENCOUNTER — TELEPHONE (OUTPATIENT)
Dept: INTERNAL MEDICINE | Facility: CLINIC | Age: 69
End: 2019-12-02

## 2019-12-02 NOTE — TELEPHONE ENCOUNTER
Called and spoke with pt, she believes she may have shingles. Explained that she would need to be evaluated. Offered appt for tomorrow at 3:00 with Elizabeth. She declined stating she has something else going on at that time. Offered her UC today or tomorrow. She declined. Stated she will have to think about things.

## 2019-12-02 NOTE — TELEPHONE ENCOUNTER
----- Message from Hermila Cano sent at 12/2/2019 12:56 PM CST -----  Contact: Patient   Type:  Needs Medical Advice    Who Called: Patient   Symptoms (please be specific): Possible Shingles  How long has patient had these symptoms: About a Week  Pharmacy name and phone #:    [facility] - [place], [place] - [address] [address]  [address] [address]  [facility] [address]  Phone: [phone] Fax: [phone]  Would the patient rather a call back or a response via [facility]? Call back   Best Call Back Number: Please call her at [phone]  Additional Information: n/a

## 2019-12-04 DIAGNOSIS — J30.89 NON-SEASONAL ALLERGIC RHINITIS DUE TO OTHER ALLERGIC TRIGGER: ICD-10-CM

## 2019-12-04 RX ORDER — FLUTICASONE PROPIONATE 50 MCG
SPRAY, SUSPENSION (ML) NASAL
Qty: 16 G | Refills: 12 | Status: SHIPPED | OUTPATIENT
Start: 2019-12-04 | End: 2021-05-31

## 2019-12-12 LAB
FINAL PATHOLOGIC DIAGNOSIS: NORMAL
GROSS: NORMAL

## 2020-01-03 ENCOUNTER — PATIENT OUTREACH (OUTPATIENT)
Dept: ADMINISTRATIVE | Facility: HOSPITAL | Age: 70
End: 2020-01-03

## 2020-01-03 DIAGNOSIS — E03.9 HYPOTHYROIDISM, UNSPECIFIED TYPE: Primary | ICD-10-CM

## 2020-01-03 DIAGNOSIS — Z00.00 BLOOD TESTS FOR ROUTINE GENERAL PHYSICAL EXAMINATION: ICD-10-CM

## 2020-01-03 DIAGNOSIS — E55.9 VITAMIN D DEFICIENCY DISEASE: ICD-10-CM

## 2020-01-03 DIAGNOSIS — E78.5 HYPERLIPIDEMIA, UNSPECIFIED HYPERLIPIDEMIA TYPE: ICD-10-CM

## 2020-01-03 NOTE — PROGRESS NOTES
HM reviewed.   Immunizations abstracted.  Care Everywhere abstracted. New results found.  Attempted to call pt to schedule ordered labs. No answer. Left VM and sent portal message.  There are no preventive care reminders to display for this patient.  Previsit chart audit completed.  *KDL*

## 2020-01-03 NOTE — PROGRESS NOTES
Pt returned phone call, agreed to come in fasting 1/6/2020 for lab work. Scheduled and linked labs accordingly.

## 2020-01-06 ENCOUNTER — LAB VISIT (OUTPATIENT)
Dept: LAB | Facility: HOSPITAL | Age: 70
End: 2020-01-06
Attending: FAMILY MEDICINE
Payer: MEDICARE

## 2020-01-06 DIAGNOSIS — E55.9 VITAMIN D DEFICIENCY DISEASE: ICD-10-CM

## 2020-01-06 DIAGNOSIS — Z00.00 BLOOD TESTS FOR ROUTINE GENERAL PHYSICAL EXAMINATION: ICD-10-CM

## 2020-01-06 DIAGNOSIS — E78.5 HYPERLIPIDEMIA, UNSPECIFIED HYPERLIPIDEMIA TYPE: ICD-10-CM

## 2020-01-06 DIAGNOSIS — E03.9 HYPOTHYROIDISM, UNSPECIFIED TYPE: ICD-10-CM

## 2020-01-06 LAB
BASOPHILS # BLD AUTO: 0.04 K/UL (ref 0–0.2)
BASOPHILS NFR BLD: 0.9 % (ref 0–1.9)
DIFFERENTIAL METHOD: ABNORMAL
EOSINOPHIL # BLD AUTO: 0.2 K/UL (ref 0–0.5)
EOSINOPHIL NFR BLD: 4.4 % (ref 0–8)
ERYTHROCYTE [DISTWIDTH] IN BLOOD BY AUTOMATED COUNT: 12.5 % (ref 11.5–14.5)
HCT VFR BLD AUTO: 41.3 % (ref 37–48.5)
HGB BLD-MCNC: 13.4 G/DL (ref 12–16)
IMM GRANULOCYTES # BLD AUTO: 0.01 K/UL (ref 0–0.04)
IMM GRANULOCYTES NFR BLD AUTO: 0.2 % (ref 0–0.5)
LYMPHOCYTES # BLD AUTO: 1.3 K/UL (ref 1–4.8)
LYMPHOCYTES NFR BLD: 27.9 % (ref 18–48)
MCH RBC QN AUTO: 32.1 PG (ref 27–31)
MCHC RBC AUTO-ENTMCNC: 32.4 G/DL (ref 32–36)
MCV RBC AUTO: 99 FL (ref 82–98)
MONOCYTES # BLD AUTO: 0.3 K/UL (ref 0.3–1)
MONOCYTES NFR BLD: 6.8 % (ref 4–15)
NEUTROPHILS # BLD AUTO: 2.7 K/UL (ref 1.8–7.7)
NEUTROPHILS NFR BLD: 59.8 % (ref 38–73)
NRBC BLD-RTO: 0 /100 WBC
PLATELET # BLD AUTO: 204 K/UL (ref 150–350)
PMV BLD AUTO: 11 FL (ref 9.2–12.9)
RBC # BLD AUTO: 4.17 M/UL (ref 4–5.4)
WBC # BLD AUTO: 4.56 K/UL (ref 3.9–12.7)

## 2020-01-06 PROCEDURE — 85025 COMPLETE CBC W/AUTO DIFF WBC: CPT

## 2020-01-06 PROCEDURE — 80053 COMPREHEN METABOLIC PANEL: CPT

## 2020-01-06 PROCEDURE — 84439 ASSAY OF FREE THYROXINE: CPT

## 2020-01-06 PROCEDURE — 84443 ASSAY THYROID STIM HORMONE: CPT

## 2020-01-06 PROCEDURE — 80061 LIPID PANEL: CPT

## 2020-01-06 PROCEDURE — 36415 COLL VENOUS BLD VENIPUNCTURE: CPT | Mod: PO

## 2020-01-06 PROCEDURE — 82306 VITAMIN D 25 HYDROXY: CPT

## 2020-01-07 LAB
25(OH)D3+25(OH)D2 SERPL-MCNC: 40 NG/ML (ref 30–96)
ALBUMIN SERPL BCP-MCNC: 3.8 G/DL (ref 3.5–5.2)
ALP SERPL-CCNC: 71 U/L (ref 55–135)
ALT SERPL W/O P-5'-P-CCNC: 16 U/L (ref 10–44)
ANION GAP SERPL CALC-SCNC: 8 MMOL/L (ref 8–16)
AST SERPL-CCNC: 18 U/L (ref 10–40)
BILIRUB SERPL-MCNC: 0.7 MG/DL (ref 0.1–1)
BUN SERPL-MCNC: 9 MG/DL (ref 8–23)
CALCIUM SERPL-MCNC: 9.6 MG/DL (ref 8.7–10.5)
CHLORIDE SERPL-SCNC: 105 MMOL/L (ref 95–110)
CHOLEST SERPL-MCNC: 195 MG/DL (ref 120–199)
CHOLEST/HDLC SERPL: 2.2 {RATIO} (ref 2–5)
CO2 SERPL-SCNC: 27 MMOL/L (ref 23–29)
CREAT SERPL-MCNC: 0.8 MG/DL (ref 0.5–1.4)
EST. GFR  (AFRICAN AMERICAN): >60 ML/MIN/1.73 M^2
EST. GFR  (NON AFRICAN AMERICAN): >60 ML/MIN/1.73 M^2
GLUCOSE SERPL-MCNC: 87 MG/DL (ref 70–110)
HDLC SERPL-MCNC: 89 MG/DL (ref 40–75)
HDLC SERPL: 45.6 % (ref 20–50)
LDLC SERPL CALC-MCNC: 97 MG/DL (ref 63–159)
NONHDLC SERPL-MCNC: 106 MG/DL
POTASSIUM SERPL-SCNC: 4.2 MMOL/L (ref 3.5–5.1)
PROT SERPL-MCNC: 6.8 G/DL (ref 6–8.4)
SODIUM SERPL-SCNC: 140 MMOL/L (ref 136–145)
T4 FREE SERPL-MCNC: 1 NG/DL (ref 0.71–1.51)
TRIGL SERPL-MCNC: 45 MG/DL (ref 30–150)
TSH SERPL DL<=0.005 MIU/L-ACNC: 3.25 UIU/ML (ref 0.4–4)

## 2020-01-28 ENCOUNTER — OFFICE VISIT (OUTPATIENT)
Dept: INTERNAL MEDICINE | Facility: CLINIC | Age: 70
End: 2020-01-28
Payer: MEDICARE

## 2020-01-28 VITALS
BODY MASS INDEX: 33.24 KG/M2 | WEIGHT: 164.88 LBS | HEART RATE: 80 BPM | DIASTOLIC BLOOD PRESSURE: 78 MMHG | TEMPERATURE: 98 F | SYSTOLIC BLOOD PRESSURE: 118 MMHG | HEIGHT: 59 IN

## 2020-01-28 DIAGNOSIS — E03.9 HYPOTHYROIDISM, UNSPECIFIED TYPE: Primary | ICD-10-CM

## 2020-01-28 DIAGNOSIS — E55.9 VITAMIN D DEFICIENCY DISEASE: ICD-10-CM

## 2020-01-28 DIAGNOSIS — M81.0 AGE-RELATED OSTEOPOROSIS WITHOUT CURRENT PATHOLOGICAL FRACTURE: ICD-10-CM

## 2020-01-28 DIAGNOSIS — M46.1 SACROILIITIS: ICD-10-CM

## 2020-01-28 DIAGNOSIS — L29.9 EAR ITCHING: ICD-10-CM

## 2020-01-28 PROCEDURE — 1159F MED LIST DOCD IN RCRD: CPT | Mod: ,,, | Performed by: FAMILY MEDICINE

## 2020-01-28 PROCEDURE — 99214 OFFICE O/P EST MOD 30 MIN: CPT | Mod: S$PBB,,, | Performed by: FAMILY MEDICINE

## 2020-01-28 PROCEDURE — 99214 PR OFFICE/OUTPT VISIT, EST, LEVL IV, 30-39 MIN: ICD-10-PCS | Mod: S$PBB,,, | Performed by: FAMILY MEDICINE

## 2020-01-28 PROCEDURE — 1159F PR MEDICATION LIST DOCUMENTED IN MEDICAL RECORD: ICD-10-PCS | Mod: ,,, | Performed by: FAMILY MEDICINE

## 2020-01-28 PROCEDURE — 99999 PR PBB SHADOW E&M-EST. PATIENT-LVL III: ICD-10-PCS | Mod: PBBFAC,,, | Performed by: FAMILY MEDICINE

## 2020-01-28 PROCEDURE — 99213 OFFICE O/P EST LOW 20 MIN: CPT | Mod: PBBFAC,PO | Performed by: FAMILY MEDICINE

## 2020-01-28 PROCEDURE — 1126F PR PAIN SEVERITY QUANTIFIED, NO PAIN PRESENT: ICD-10-PCS | Mod: ,,, | Performed by: FAMILY MEDICINE

## 2020-01-28 PROCEDURE — 1126F AMNT PAIN NOTED NONE PRSNT: CPT | Mod: ,,, | Performed by: FAMILY MEDICINE

## 2020-01-28 PROCEDURE — 99999 PR PBB SHADOW E&M-EST. PATIENT-LVL III: CPT | Mod: PBBFAC,,, | Performed by: FAMILY MEDICINE

## 2020-01-28 RX ORDER — LEVOTHYROXINE SODIUM 50 UG/1
50 TABLET ORAL DAILY
Qty: 90 TABLET | Refills: 3 | Status: SHIPPED | OUTPATIENT
Start: 2020-01-28 | End: 2021-01-04 | Stop reason: SDUPTHER

## 2020-01-28 RX ORDER — LEVOTHYROXINE SODIUM 50 UG/1
50 TABLET ORAL DAILY
Qty: 90 TABLET | Refills: 3 | Status: SHIPPED | OUTPATIENT
Start: 2020-01-28 | End: 2020-01-28 | Stop reason: SDUPTHER

## 2020-01-28 NOTE — PROGRESS NOTES
Subjective:      Patient ID: Rajni Melchor is a 70 y.o. female.    Chief Complaint: Annual Exam    Disclaimer:  This note is prepared using voice recognition software and as such is likely to have errors and has not been proof read. Please contact me for questions.     Rajni Melchor is a 70 y.o. female who presents today  For prevention exam. At night only the left buttock and leg hurt and radiates down. Takes advil and help. Only to knee. Did morgan's before dr wetzel. May benefit from 3rd shot.  Last x-ray is as noted below.  TECHNIQUE:  AP lateral and spot views of the lumbar spine    COMPARISON:  MRI from 01/05/2018 and the radiographs from 07/26/2017    FINDINGS:  Multilevel discogenic degenerative changes lumbar spine are again seen.  Findings appear similar to slightly progressed since 07/26/2017.  There is marked intervertebral disc space narrowing at L5-S1.  Multilevel spurring of the vertebral body endplates and facet arthropathy is seen.  There is possible minimal increase in depression of the superior endplate of the T12 vertebral body since the comparison exam.  Please correlate with point tenderness to determine acuity.  Dedicated thoracic spine radiographs can be obtained versus correlation with cross-sectional imaging as indicated.  Sacroiliac joints are intact    Impression      As above      Electronically signed by: Andres Munson MD  Date: 09/10/2018  Time: 13:18    With sleeping is really the only time she has the discomfort.  Did stop doing Prolia injections back in 2018.  Last bone density showed no significant change but still with osteoporosis.  Does not want to do additional medicines at this time.    Wt Readings from Last 10 Encounters:  01/28/20 : 74.8 kg (164 lb 14.5 oz)  11/12/19 : 73.3 kg (161 lb 9.6 oz)  11/07/19 : 73.9 kg (162 lb 14.7 oz)  10/14/19 : 73.8 kg (162 lb 11.2 oz)  06/08/19 : 75.1 kg (165 lb 9.1 oz)  03/26/19 : 74.8 kg (165 lb)  03/25/19 : 74.9 kg (165 lb 2  oz)  11/01/18 : 74.8 kg (165 lb)  10/01/18 : 75.8 kg (167 lb 1.7 oz)  10/01/18 : 76.4 kg (168 lb 6.9 oz)    Lab Results       Component                Value               Date                       HGBA1C                   5.5                 01/23/2013             Lab Results       Component                Value               Date                       CHOL                     195                 01/06/2020                 CHOL                     186                 03/27/2018                 CHOL                     199                 02/20/2017            Lab Results       Component                Value               Date                       LDLCALC                  97.0                01/06/2020                 LDLCALC                  88.4                03/27/2018                 LDLCALC                  105.6               02/20/2017              The 10-year ASCVD risk score (Presley MONAE Jr., et al., 2013) is: 6.5%    Values used to calculate the score:      Age: 70 years      Sex: Female      Is Non- : No      Diabetic: No      Tobacco smoker: No      Systolic Blood Pressure: 118 mmHg      Is BP treated: No      HDL Cholesterol: 89 mg/dL      Total Cholesterol: 195 mg/dL    Currently is on Levoxyl 50 mcg.  Doing well labs reviewed.    Is on vitamin-D supplementation at 2000 labs are within goal ranges.    Cholesterol levels are very good with high amounts of HDL.    Reports with Dr. hooker her gynecologist she ended up having had a endometrial biopsy which was extremely painful.  They were not able to get enough cells so she ultimately ended up having a follow-up ultrasound at 6 weeks.  It had gotten smaller so they recommended no further issues and no further biopsies.  She reports she will do another endometrial biopsy again because it was so painful.          Lab Results   Component Value Date    WBC 4.56 01/06/2020    HGB 13.4 01/06/2020    HCT 41.3 01/06/2020      01/06/2020    CHOL 195 01/06/2020    TRIG 45 01/06/2020    HDL 89 (H) 01/06/2020    ALT 16 01/06/2020    AST 18 01/06/2020     01/06/2020    K 4.2 01/06/2020     01/06/2020    CREATININE 0.8 01/06/2020    BUN 9 01/06/2020    CO2 27 01/06/2020    TSH 3.248 01/06/2020    HGBA1C 5.5 01/23/2013       Mammo Digital Screening Bilat w/ Edilberto  Narrative: Result:  Mammo Digital Screening Bilat w/ Edilberto    History:  Patient is 69 y.o. and is seen for a screening mammogram.    Films Compared:  Compared to: 10/08/2018 Mammo Digital Screening Bilat with   Tomosynthesis_CAD and 08/15/2017 Mammo Digital Screening Bilat with CAD    Findings:  Computer-aided detection was utilized in the interpretation of this   examination. This procedure was performed using tomosynthesis.       The breasts have scattered areas of fibroglandular density. There is no   evidence of suspicious masses, microcalcifications or architectural   distortion.  Impression: No mammographic evidence of malignancy.    BI-RADS Category 1: Negative    Recommendation:  Routine screening mammogram in 1 year is recommended.     Your estimated lifetime risk of breast cancer (to age 85) based on   Tyrer-Cuzick risk assessment model is Tyrer-Cuzick: 3.83 %. According to   the American Cancer Society, patients with a lifetime breast cancer risk   of 20% or higher might benefit from supplemental screening tests. ??         Review of Systems   Constitutional: Negative for activity change, chills, fatigue, fever and unexpected weight change.   HENT: Negative for ear pain, hearing loss, rhinorrhea and trouble swallowing.    Eyes: Negative for pain, discharge and visual disturbance.   Respiratory: Negative for cough, chest tightness, shortness of breath and wheezing.    Cardiovascular: Negative for chest pain, palpitations and leg swelling.   Gastrointestinal: Negative for abdominal pain, blood in stool, constipation, diarrhea, nausea and vomiting.   Endocrine:  "Negative for cold intolerance, heat intolerance, polydipsia and polyuria.   Genitourinary: Negative for difficulty urinating, dysuria, frequency, hematuria and menstrual problem.   Musculoskeletal: Positive for arthralgias. Negative for joint swelling, myalgias and neck pain.   Skin: Negative for color change and rash.   Neurological: Negative for dizziness, weakness and headaches.   Psychiatric/Behavioral: Negative for behavioral problems, confusion, dysphoric mood and sleep disturbance.     Objective:     Vitals:    01/28/20 0941   BP: 118/78   Pulse: 80   Temp: 98 °F (36.7 °C)   Weight: 74.8 kg (164 lb 14.5 oz)   Height: 4' 11" (1.499 m)     Physical Exam  Assessment:     1. Hypothyroidism, unspecified type    2. Vitamin D deficiency disease    3. Age-related osteoporosis without current pathological fracture    4. Sacroiliitis    5. Ear itching      Plan:   Rajni was seen today for annual exam.    Diagnoses and all orders for this visit:    Hypothyroidism, unspecified type-continue current medications refill today labs reviewed.    Vitamin D deficiency disease-continue current medications refill today labs reviewed.    Age-related osteoporosis without current pathological fracture declines prolia. Reviewed prior dexa. No sign change.  Continue with weight-bearing exercises    Sacroiliitis-advised patient to follow up with Orthopedics to consider 3rd additional JESUSITA    Itchy ears-can apply steroid cream topically or even mineral oil.  Follow up if not improving.    Other orders  -     Discontinue: levothyroxine (LEVOXYL) 50 MCG tablet; Take 1 tablet (50 mcg total) by mouth once daily.  -     levothyroxine (LEVOXYL) 50 MCG tablet; Take 1 tablet (50 mcg total) by mouth once daily.            Follow up in about 1 year (around 1/28/2021) for physical with Dr HARDING.    Patient Instructions   Shingrix vaccine is the new shingles vaccine. Ask at pharmacy.  2 shots, not live, covered by insurance. 90 % effective against " Shingles. Can start it now at age 50. Not doing the old Zostavax anymore. Even if you got zostavax, it is recommended to get the new shingles vaccine.

## 2020-08-12 ENCOUNTER — OFFICE VISIT (OUTPATIENT)
Dept: INTERNAL MEDICINE | Facility: CLINIC | Age: 70
End: 2020-08-12
Payer: MEDICARE

## 2020-08-12 ENCOUNTER — CLINICAL SUPPORT (OUTPATIENT)
Dept: URGENT CARE | Facility: CLINIC | Age: 70
End: 2020-08-12
Payer: MEDICARE

## 2020-08-12 VITALS — HEART RATE: 74 BPM | TEMPERATURE: 97 F | OXYGEN SATURATION: 96 %

## 2020-08-12 DIAGNOSIS — J06.9 VIRAL URI WITH COUGH: ICD-10-CM

## 2020-08-12 DIAGNOSIS — R05.9 COUGH: ICD-10-CM

## 2020-08-12 DIAGNOSIS — J06.9 VIRAL URI WITH COUGH: Primary | ICD-10-CM

## 2020-08-12 PROCEDURE — U0003 INFECTIOUS AGENT DETECTION BY NUCLEIC ACID (DNA OR RNA); SEVERE ACUTE RESPIRATORY SYNDROME CORONAVIRUS 2 (SARS-COV-2) (CORONAVIRUS DISEASE [COVID-19]), AMPLIFIED PROBE TECHNIQUE, MAKING USE OF HIGH THROUGHPUT TECHNOLOGIES AS DESCRIBED BY CMS-2020-01-R: HCPCS

## 2020-08-12 PROCEDURE — 99213 OFFICE O/P EST LOW 20 MIN: CPT | Mod: 95,,, | Performed by: NURSE PRACTITIONER

## 2020-08-12 PROCEDURE — 99213 PR OFFICE/OUTPT VISIT, EST, LEVL III, 20-29 MIN: ICD-10-PCS | Mod: 95,,, | Performed by: NURSE PRACTITIONER

## 2020-08-12 NOTE — PROGRESS NOTES
Subjective:       Patient ID: Rajni Melchor is a 70 y.o. female.    Chief Complaint: COVID-19 Concerns    The patient location is: home  The chief complaint leading to consultation is: request covid test, congestion    Visit type: audiovisual    Face to Face time with patient: 15 minutes  20 minutes of total time spent on the encounter, which includes face to face time and non-face to face time preparing to see the patient (eg, review of tests), Obtaining and/or reviewing separately obtained history, Documenting clinical information in the electronic or other health record, Independently interpreting results (not separately reported) and communicating results to the patient/family/caregiver, or Care coordination (not separately reported).         Each patient to whom he or she provides medical services by telemedicine is:  (1) informed of the relationship between the physician and patient and the respective role of any other health care provider with respect to management of the patient; and (2) notified that he or she may decline to receive medical services by telemedicine and may withdraw from such care at any time.    Patient trimmed bushes 2 days ago  Started with itchy throat and gargled which improved  Came back yesterday with progression of congestion to the right side  Feels post nasal drip   No fever  Has progressing fatigue since yesterday  No loss of taste or smell  She does have a cough and headache  No shortness of breath   No n, v, d  She has been taking tylenol, alahist  Patient wants a covid test      There were no vitals taken for this visit.    Review of Systems   Constitutional: Positive for activity change and fatigue. Negative for appetite change, chills, diaphoresis, fever and unexpected weight change.   HENT: Positive for congestion, postnasal drip, rhinorrhea and sore throat. Negative for ear pain, facial swelling, sinus pressure, sinus pain and sneezing.    Respiratory: Positive for  cough. Negative for apnea, choking, chest tightness and shortness of breath.    Cardiovascular: Negative for chest pain, palpitations and leg swelling.   Gastrointestinal: Negative.    Genitourinary: Negative.    Musculoskeletal: Negative.    Skin: Negative for color change, pallor, rash and wound.   Allergic/Immunologic: Negative for immunocompromised state.   Neurological: Positive for headaches. Negative for dizziness and facial asymmetry.   Hematological: Negative for adenopathy. Does not bruise/bleed easily.   Psychiatric/Behavioral: Negative for agitation, behavioral problems and confusion.       Objective:      Physical Exam  Constitutional:       General: She is not in acute distress.     Appearance: She is well-developed. She is not diaphoretic.   HENT:      Head: Normocephalic and atraumatic.      Right Ear: External ear normal.      Left Ear: External ear normal.   Eyes:      General: No scleral icterus.        Right eye: No discharge.         Left eye: No discharge.      Conjunctiva/sclera: Conjunctivae normal.   Neck:      Musculoskeletal: Normal range of motion and neck supple.   Pulmonary:      Effort: Pulmonary effort is normal. No tachypnea, accessory muscle usage or respiratory distress.      Breath sounds: No stridor.   Skin:     Findings: No rash.   Neurological:      Mental Status: She is alert. She is not disoriented.   Psychiatric:         Attention and Perception: She is attentive.         Mood and Affect: Mood is not anxious or depressed. Affect is not labile, blunt, angry or inappropriate.         Speech: Speech normal.         Behavior: Behavior normal.         Thought Content: Thought content normal.         Judgment: Judgment normal.         Assessment:       1. Viral URI with cough    2. Cough        Plan:       Rajni was seen today for covid-19 concerns.    Diagnoses and all orders for this visit:    Viral URI with cough  -     COVID-19 Routine Screening; Future    Cough  -      COVID-19 Routine Screening; Future    r/o covid  Patient declined rx singulair  Continue flonase, allegra  Supportive care  quarantine until test comes back  Emergency Room if increased work of breathing

## 2020-08-13 LAB — SARS-COV-2 RNA RESP QL NAA+PROBE: NOT DETECTED

## 2020-08-14 ENCOUNTER — TELEPHONE (OUTPATIENT)
Dept: INTERNAL MEDICINE | Facility: CLINIC | Age: 70
End: 2020-08-14

## 2020-08-14 NOTE — TELEPHONE ENCOUNTER
----- Message from Gris Chung sent at 8/14/2020  9:25 AM CDT -----  Type:  Needs Medical Advice    Who Called: Patient  Symptoms (please be specific): coughing,stuffy,chest congestion   How long has patient had these symptoms: 8/12  Pharmacy name and phone #:  [facility]  Would the patient rather a call back or a response via [facility]? Call back  Best Call Back Number: [phone]  Additional Information: pt thinks she need an antibiotic

## 2020-08-14 NOTE — TELEPHONE ENCOUNTER
Patient's symptoms were consistent with a respiratory virus. Unfortunately viruses do not kill bacteria. If she is not improving in 7-10 days of being home, treating her symptoms, she can see Primary Care Physician  For re evaluation

## 2020-08-14 NOTE — TELEPHONE ENCOUNTER
Spoke with pt. Informed of Krissy's reply. Educated on tx for viral respiratory sickness. Offered to schedule a televisit or in person with PCP. Pt hung up before responding.

## 2020-10-07 ENCOUNTER — PES CALL (OUTPATIENT)
Dept: ADMINISTRATIVE | Facility: CLINIC | Age: 70
End: 2020-10-07

## 2020-11-09 ENCOUNTER — OFFICE VISIT (OUTPATIENT)
Dept: INTERNAL MEDICINE | Facility: CLINIC | Age: 70
End: 2020-11-09
Payer: MEDICARE

## 2020-11-09 VITALS
HEIGHT: 59 IN | HEART RATE: 70 BPM | WEIGHT: 163.81 LBS | RESPIRATION RATE: 18 BRPM | TEMPERATURE: 98 F | BODY MASS INDEX: 33.02 KG/M2 | DIASTOLIC BLOOD PRESSURE: 68 MMHG | SYSTOLIC BLOOD PRESSURE: 120 MMHG

## 2020-11-09 DIAGNOSIS — M47.816 LUMBAR SPONDYLOSIS: ICD-10-CM

## 2020-11-09 DIAGNOSIS — M46.1 SACROILIITIS: ICD-10-CM

## 2020-11-09 DIAGNOSIS — Z86.010 HISTORY OF COLON POLYPS: ICD-10-CM

## 2020-11-09 DIAGNOSIS — M81.0 AGE-RELATED OSTEOPOROSIS WITHOUT CURRENT PATHOLOGICAL FRACTURE: ICD-10-CM

## 2020-11-09 DIAGNOSIS — J30.89 NON-SEASONAL ALLERGIC RHINITIS DUE TO OTHER ALLERGIC TRIGGER: ICD-10-CM

## 2020-11-09 DIAGNOSIS — Z12.31 ENCOUNTER FOR SCREENING MAMMOGRAM FOR BREAST CANCER: ICD-10-CM

## 2020-11-09 DIAGNOSIS — R21 RASH: ICD-10-CM

## 2020-11-09 DIAGNOSIS — Z00.00 ENCOUNTER FOR PREVENTIVE HEALTH EXAMINATION: Primary | ICD-10-CM

## 2020-11-09 DIAGNOSIS — E55.9 VITAMIN D DEFICIENCY DISEASE: ICD-10-CM

## 2020-11-09 DIAGNOSIS — E78.5 HYPERLIPIDEMIA, UNSPECIFIED HYPERLIPIDEMIA TYPE: ICD-10-CM

## 2020-11-09 DIAGNOSIS — E03.9 HYPOTHYROIDISM, UNSPECIFIED TYPE: ICD-10-CM

## 2020-11-09 PROCEDURE — 90694 VACC AIIV4 NO PRSRV 0.5ML IM: CPT | Mod: PBBFAC,PO

## 2020-11-09 PROCEDURE — 99215 OFFICE O/P EST HI 40 MIN: CPT | Mod: PBBFAC,PO,25 | Performed by: NURSE PRACTITIONER

## 2020-11-09 PROCEDURE — G0439 PPPS, SUBSEQ VISIT: HCPCS | Mod: S$GLB,,, | Performed by: NURSE PRACTITIONER

## 2020-11-09 PROCEDURE — 99999 PR PBB SHADOW E&M-EST. PATIENT-LVL V: CPT | Mod: PBBFAC,,, | Performed by: NURSE PRACTITIONER

## 2020-11-09 PROCEDURE — G0008 ADMIN INFLUENZA VIRUS VAC: HCPCS | Mod: PBBFAC,PO

## 2020-11-09 PROCEDURE — G0439 PR MEDICARE ANNUAL WELLNESS SUBSEQUENT VISIT: ICD-10-PCS | Mod: S$GLB,,, | Performed by: NURSE PRACTITIONER

## 2020-11-09 PROCEDURE — 99999 PR PBB SHADOW E&M-EST. PATIENT-LVL V: ICD-10-PCS | Mod: PBBFAC,,, | Performed by: NURSE PRACTITIONER

## 2020-11-09 NOTE — PROGRESS NOTES
"  Rajni Melchor presented for a  Medicare AWV and comprehensive Health Risk Assessment today. The following components were reviewed and updated:    · Medical history  · Family History  · Social history  · Allergies and Current Medications  · Health Risk Assessment  · Health Maintenance  · Care Team     Patient Care Team:  Kylah Benedict MD as PCP - General (Family Medicine)  Yuri Coppola MD as Consulting Physician (Pain Medicine)  Aanmika Lorenzo PA-C as Physician Assistant (Rheumatology)     ** See Completed Assessments for Annual Wellness Visit within the encounter summary.**         The following assessments were completed:  · Living Situation  · CAGE  · Depression Screening  · Timed Get Up and Go  · Whisper Test  · Cognitive Function Screening  · Nutrition Screening  · ADL Screening  · PAQ Screening        Vitals:    11/09/20 1303   BP: 120/68   Pulse: 70   Resp: 18   Temp: 98.1 °F (36.7 °C)   TempSrc: Tympanic   Weight: 74.3 kg (163 lb 12.8 oz)   Height: 4' 11" (1.499 m)     Body mass index is 33.08 kg/m².  Physical Exam  Vitals signs and nursing note reviewed.   Constitutional:       General: She is not in acute distress.     Appearance: She is well-developed. She is not diaphoretic.   HENT:      Head: Normocephalic and atraumatic.   Eyes:      General:         Right eye: No discharge.         Left eye: No discharge.      Conjunctiva/sclera: Conjunctivae normal.   Cardiovascular:      Rate and Rhythm: Normal rate and regular rhythm.      Heart sounds: Normal heart sounds. No murmur.   Pulmonary:      Effort: Pulmonary effort is normal. No respiratory distress.      Breath sounds: Normal breath sounds. No wheezing or rales.   Chest:      Chest wall: No tenderness.   Abdominal:      General: There is no distension.      Palpations: Abdomen is soft.   Musculoskeletal: Normal range of motion.   Skin:     General: Skin is warm and dry.      Findings: No rash.      Comments: Erythematous papular rash under " left breast   Neurological:      Mental Status: She is alert and oriented to person, place, and time.   Psychiatric:         Behavior: Behavior normal. Behavior is cooperative.         Thought Content: Thought content normal.         Judgment: Judgment normal.                   Diagnoses and health risks identified today and associated recommendations/orders:    1. Encounter for preventive health examination  Flu shot today  Schedule mammogram  Due to see Dr. Benedict 1/2021    2. Lumbar spondylosis  Stable with intermittent symptoms.  Will continue current treatment plan. Xray 9/10/18. Multilevel discogenic degenerative changes lumbar spine are again seen.  Findings appear similar to slightly progressed since 07/26/2017.  There is marked intervertebral disc space narrowing at L5-S1.  Multilevel spurring of the vertebral body endplates and facet arthropathy is seen.  There is possible minimal increase in depression of the superior endplate of the T12 vertebral body since the comparison exam.  Please correlate with point tenderness to determine acuity.  Dedicated thoracic spine radiographs can be obtained versus correlation with cross-sectional imaging as indicated.  Sacroiliac joints are intact    3. Hyperlipidemia, unspecified hyperlipidemia type  Stable. On statin. Continue treatment plan as prescribed by your Primary Care Physician and follow up with Primary Care Physician for further management.  Lab Results   Component Value Date    CHOL 195 01/06/2020    CHOL 186 03/27/2018    CHOL 199 02/20/2017     Lab Results   Component Value Date    HDL 89 (H) 01/06/2020    HDL 88 (H) 03/27/2018    HDL 83 (H) 02/20/2017     Lab Results   Component Value Date    LDLCALC 97.0 01/06/2020    LDLCALC 88.4 03/27/2018    LDLCALC 105.6 02/20/2017     Lab Results   Component Value Date    TRIG 45 01/06/2020    TRIG 48 03/27/2018    TRIG 52 02/20/2017     Lab Results   Component Value Date    CHOLHDL 45.6 01/06/2020    CHOLHDL 47.3  03/27/2018    CHOLHDL 41.7 02/20/2017     4. Hypothyroidism, unspecified type  Stable. On levothyroxine. Continue treatment plan as prescribed by your Primary Care Physician and follow up with Primary Care Physician for further management.  Lab Results   Component Value Date    TSH 3.248 01/06/2020       5. Vitamin D deficiency disease  Stable. On supplementation. Continue treatment plan as prescribed by your Primary Care Physician and follow up with Primary Care Physician for further management.    6. History of colon polyps  Stable. c scope 11/12/19. Continue treatment plan as prescribed by your Primary Care Physician and follow up with Primary Care Physician for further management.    7. Age-related osteoporosis without current pathological fracture  Last dexa 11/7/19 with osteoporosis. Was on prolia, not currently. Needs to follow up with Anamika Lorenzo, imani    8. Sacroiliitis  Stable. Continue treatment plan as prescribed by your Primary Care Physician and follow up with Primary Care Physician for further management.    9. Non-seasonal allergic rhinitis due to other allergic trigger  Stable. Uses allegra and flonase prn. Continue treatment plan as prescribed by your Primary Care Physician and follow up with Primary Care Physician for further management.    10. Encounter for screening mammogram for breast cancer  - Mammo Digital Screening Bilat w/ Edilberto; Future    11. Rash  - Ambulatory referral/consult to Dermatology; Future      Provided Rajni with a 5-10 year written screening schedule and personal prevention plan. Recommendations were developed using the USPSTF age appropriate recommendations. Education, counseling, and referrals were provided as needed. After Visit Summary printed and given to patient which includes a list of additional screenings\tests needed.    Follow up in about 1 year (around 11/9/2021) for hra and 1/2021 with Dr. Benedict for wellness.    MITZI Lo  I offered to discuss end  of life issues, including information on how to make advance directives that the patient could use to name someone who would make medical decisions on their behalf if they became too ill to make themselves.    ___Patient declined  _X_Patient is interested, I provided paper work and offered to discuss.

## 2020-11-09 NOTE — PATIENT INSTRUCTIONS
Clotrimazole cream under breast     You are a candidate to receive the new shingles vaccination.  It is called Shingrix. Due to medicare rules we can not give it to you in the clinic.  You will need to get it at the pharmacy.  You can check with your local pharmacy to arrange to get the vaccination.  It is a 2 shot series.  You will get the first shot and then a second shot between 2 and 6 months later.    Counseling and Referral of Other Preventative  (Italic type indicates deductible and co-insurance are waived)    Patient Name: Rajni Melchor  Today's Date: 11/9/2020    Health Maintenance       Date Due Completion Date    Shingles Vaccine (2 of 3) 08/13/2012 6/18/2012    Influenza Vaccine (1) 08/01/2020 11/2/2019    Mammogram 11/07/2020 11/7/2019    Override on 8/15/2011: Done    DEXA SCAN 11/07/2021 11/7/2019    TETANUS VACCINE 06/18/2022 6/18/2012    Colorectal Cancer Screening 11/12/2024 11/12/2019    Lipid Panel 01/06/2025 1/6/2020        No orders of the defined types were placed in this encounter.    The following information is provided to all patients.  This information is to help you find resources for any of the problems found today that may be affecting your health:                Living healthy guide: www.[facility].gov      Understanding Diabetes: www.diabetes.org      Eating healthy: www.cdc.gov/healthyweight      CDC home safety checklist: www.cdc.gov/steadi/patient.html      Agency on Aging: www.goea.[place].gov      Alcoholics anonymous (AA): www.aa.org      Physical Activity: www.joaquín.nih.gov/cz0qifj      Tobacco use: www.quitwithusla.org

## 2020-11-11 ENCOUNTER — OFFICE VISIT (OUTPATIENT)
Dept: DERMATOLOGY | Facility: CLINIC | Age: 70
End: 2020-11-11
Payer: MEDICARE

## 2020-11-11 DIAGNOSIS — L29.9 SCALP PRURITUS: ICD-10-CM

## 2020-11-11 DIAGNOSIS — L30.9 DERMATITIS: Primary | ICD-10-CM

## 2020-11-11 DIAGNOSIS — R21 RASH: ICD-10-CM

## 2020-11-11 PROCEDURE — 99999 PR PBB SHADOW E&M-EST. PATIENT-LVL III: CPT | Mod: PBBFAC,,, | Performed by: PHYSICIAN ASSISTANT

## 2020-11-11 PROCEDURE — 99214 PR OFFICE/OUTPT VISIT, EST, LEVL IV, 30-39 MIN: ICD-10-PCS | Mod: S$PBB,ICN,, | Performed by: PHYSICIAN ASSISTANT

## 2020-11-11 PROCEDURE — 99999 PR PBB SHADOW E&M-EST. PATIENT-LVL III: ICD-10-PCS | Mod: PBBFAC,,, | Performed by: PHYSICIAN ASSISTANT

## 2020-11-11 PROCEDURE — 99214 OFFICE O/P EST MOD 30 MIN: CPT | Mod: S$PBB,ICN,, | Performed by: PHYSICIAN ASSISTANT

## 2020-11-11 PROCEDURE — 99213 OFFICE O/P EST LOW 20 MIN: CPT | Mod: PBBFAC,PO | Performed by: PHYSICIAN ASSISTANT

## 2020-11-11 RX ORDER — IBUPROFEN 100 MG/5ML
1000 SUSPENSION, ORAL (FINAL DOSE FORM) ORAL DAILY
COMMUNITY

## 2020-11-11 RX ORDER — FLUOCINOLONE ACETONIDE 0.1 MG/ML
SOLUTION TOPICAL
Qty: 60 ML | Refills: 1 | Status: SHIPPED | OUTPATIENT
Start: 2020-11-11 | End: 2021-05-19 | Stop reason: SDUPTHER

## 2020-11-11 RX ORDER — TRIAMCINOLONE ACETONIDE 1 MG/G
CREAM TOPICAL 2 TIMES DAILY
Qty: 80 G | Refills: 0 | Status: SHIPPED | OUTPATIENT
Start: 2020-11-11 | End: 2021-01-06 | Stop reason: SDUPTHER

## 2020-11-11 NOTE — LETTER
November 16, 2020      Krissy Carter, TERRIE-C  [address] [place] [address]           [place] [facility]  [address] [place] [address]  Phone: [phone]  Fax: [phone]          Patient: Rajni Melchor   MR Number: [id number]   YOB: 1950   Date of Visit: 11/11/2020       Dear Krissy Carter:    Thank you for referring Rajni Melchor to me for evaluation. Attached you will find relevant portions of my assessment and plan of care.    If you have questions, please do not hesitate to call me. I look forward to following Rajni Melchor along with you.    Sincerely,    Anjana De León PA-C    Enclosure  CC:  No Recipients    If you would like to receive this communication electronically, please contact [email or web address] or [phone] to request more information on [facility] Link access.    For providers and/or their staff who would like to refer a patient to [facility], please contact us through our one-stop-shop provider referral line, [facility], at [phone].    If you feel you have received this communication in error or would no longer like to receive these types of communications, please e-mail [email or web address]

## 2020-11-11 NOTE — PROGRESS NOTES
Subjective:       Patient ID:  Rajni Melchor is a 70 y.o. female who presents for   Chief Complaint   Patient presents with    Itching     scalp     Rash     under left breast      History of Present Illness: The patient presents with chief complaint of itching. Seen by Dr. Benedict 1/28/20 for c/o ear itching and recommended to use otc steroid cream or mineral oil prn. Still with intermittent flares and now also w/associated scalp itching (occipital).  Denies dandruff or redness. Denies known psoriais or FHX of psoriasis. Denies new soaps or detergents or meds.   Location: scalp and posterior ears  Duration: 3 weeks  Signs/Symptoms: sores     Prior treatments: vinegar and otc cream    History of Present Illness: The patient presents with chief complaint of rash  Location: under left breast  Duration: 2 weeks  Signs/Symptoms: itching and red; worse w/heat or sweating.     Prior treatments: otc cream (anti-fungal); zovirax (for cold sore of mouth, but helped w/itching); benadryl (helped w/itching)        History of Present Illness: The patient presents with chief complaint of itching  Location: scalp and posterior ears  Duration: 3 weeks  Signs/Symptoms: sores     Prior treatments: vinegar and otc cream    History of Present Illness: The patient presents with chief complaint of rash  Location: under left breast  Duration: 2 weeks  Signs/Symptoms: itching and red    Prior treatments: otc cream      Review of Systems     Objective:    Physical Exam   Constitutional: She appears well-developed and well-nourished. No distress.   Neurological: She is alert and oriented to person, place, and time. She is not disoriented.   Psychiatric: She has a normal mood and affect.   Skin:   Areas Examined (abnormalities noted in diagram):   Scalp / Hair Palpated and Inspected  Head / Face Inspection Performed  Neck Inspection Performed  Chest / Axilla Inspection Performed  Abdomen Inspection Performed  Back Inspection  Performed  RUE Inspected  LUE Inspection Performed  RLE Inspected  LLE Inspection Performed                   Diagram Legend     Erythematous scaling macule/papule c/w actinic keratosis       Vascular papule c/w angioma      Pigmented verrucoid papule/plaque c/w seborrheic keratosis      Yellow umbilicated papule c/w sebaceous hyperplasia      Irregularly shaped tan macule c/w lentigo     1-2 mm smooth white papules consistent with Milia      Movable subcutaneous cyst with punctum c/w epidermal inclusion cyst      Subcutaneous movable cyst c/w pilar cyst      Firm pink to brown papule c/w dermatofibroma      Pedunculated fleshy papule(s) c/w skin tag(s)      Evenly pigmented macule c/w junctional nevus     Mildly variegated pigmented, slightly irregular-bordered macule c/w mildly atypical nevus      Flesh colored to evenly pigmented papule c/w intradermal nevus       Pink pearly papule/plaque c/w basal cell carcinoma      Erythematous hyperkeratotic cursted plaque c/w SCC      Surgical scar with no sign of skin cancer recurrence      Open and closed comedones      Inflammatory papules and pustules      Verrucoid papule consistent consistent with wart     Erythematous eczematous patches and plaques     Dystrophic onycholytic nail with subungual debris c/w onychomycosis     Umbilicated papule    Erythematous-base heme-crusted tan verrucoid plaque consistent with inflamed seborrheic keratosis     Erythematous Silvery Scaling Plaque c/w Psoriasis     See annotation      Assessment / Plan:        Dermatitis  -     triamcinolone acetonide 0.1% (KENALOG) 0.1 % cream; Apply topically 2 (two) times daily. PRN rash of abdomen.  Dispense: 80 g; Refill: 0  Rash  -     Ambulatory referral/consult to Dermatology  Ddx: ACD vs. Other eczematous   Trial of sensitive skin regimen and TAC 0.1% cream BID. If not improved with above, would reconsider biopsy in future. She declines rx for hydroxyzine for bedtime. Cool compresses prn.      Scalp pruritus  -     fluocinolone (SYNALAR) 0.01 % external solution; AAA of scalp qd to bid prn scalp itching.  Dispense: 60 mL; Refill: 1  No rash or erythema. Trial of Fluocinolone solution bid.          Follow up in about 4 weeks (around 12/9/2020) for eczema.

## 2020-11-11 NOTE — PATIENT INSTRUCTIONS
New Skin Care Regimen  Soap: Dove sensitive skin bar   Moisturizer: ceraVe or cetaphil cream  Detergent: Tide Free, All Free, or Cheer Free   Fabric softener: do not use  Colognes/Perfumes/Fragrances: do not use  Bathing: shower or bathe with lukewarm water < 10 minutes    *Vanicream shampoo and conditioner are Free and Clear

## 2020-12-16 ENCOUNTER — OFFICE VISIT (OUTPATIENT)
Dept: DERMATOLOGY | Facility: CLINIC | Age: 70
End: 2020-12-16
Payer: MEDICARE

## 2020-12-16 DIAGNOSIS — L30.9 DERMATITIS: Primary | ICD-10-CM

## 2020-12-16 DIAGNOSIS — L29.9 SCALP PRURITUS: ICD-10-CM

## 2020-12-16 PROCEDURE — 99999 PR PBB SHADOW E&M-EST. PATIENT-LVL III: ICD-10-PCS | Mod: PBBFAC,,, | Performed by: PHYSICIAN ASSISTANT

## 2020-12-16 PROCEDURE — 99213 OFFICE O/P EST LOW 20 MIN: CPT | Mod: S$PBB,,, | Performed by: PHYSICIAN ASSISTANT

## 2020-12-16 PROCEDURE — 99213 PR OFFICE/OUTPT VISIT, EST, LEVL III, 20-29 MIN: ICD-10-PCS | Mod: S$PBB,,, | Performed by: PHYSICIAN ASSISTANT

## 2020-12-16 PROCEDURE — 99999 PR PBB SHADOW E&M-EST. PATIENT-LVL III: CPT | Mod: PBBFAC,,, | Performed by: PHYSICIAN ASSISTANT

## 2020-12-16 PROCEDURE — 99213 OFFICE O/P EST LOW 20 MIN: CPT | Mod: PBBFAC,PO | Performed by: PHYSICIAN ASSISTANT

## 2020-12-16 NOTE — PROGRESS NOTES
hx  Subjective:       Patient ID:  Rajni Melchor is a 70 y.o. female who presents for   Chief Complaint   Patient presents with    Rash     under breast, not improving     Itching     scalp, improved     Hx of pruritus of scalp, ears and left trunk (itching of trunk associated with rash), last seen 11/11/20. Started fluocinolone solution bid for scalp for presumed seb derm component.  For trunk, started on TAC 0.1% cream bid prn (? ACD vs. Other) and recommended sensitive skin care regimen.  Currently, using head and shoulders of scalp and a hair mousse. Using Dial soap and baby lotion.  Reports intermittent flares of itching and rash.           Review of Systems   Constitutional: Negative for fever and chills.   Gastrointestinal: Negative for nausea and vomiting.   Skin: Positive for itching, rash and activity-related sunscreen use. Negative for dry skin, sun sensitivity, daily sunscreen use, recent sunburn and dry lips.   Hematologic/Lymphatic: Does not bruise/bleed easily.        Objective:    Physical Exam   Constitutional: She appears well-developed and well-nourished. No distress.   Neurological: She is alert and oriented to person, place, and time. She is not disoriented.   Psychiatric: She has a normal mood and affect.   Skin:   Areas Examined (abnormalities noted in diagram):   Scalp / Hair Palpated and Inspected  Head / Face Inspection Performed  Neck Inspection Performed  Chest / Axilla Inspection Performed  Abdomen Inspection Performed  Back Inspection Performed  RUE Inspected  LUE Inspection Performed  RLE Inspected  LLE Inspection Performed                   Diagram Legend     Erythematous scaling macule/papule c/w actinic keratosis       Vascular papule c/w angioma      Pigmented verrucoid papule/plaque c/w seborrheic keratosis      Yellow umbilicated papule c/w sebaceous hyperplasia      Irregularly shaped tan macule c/w lentigo     1-2 mm smooth white papules consistent with Milia       Movable subcutaneous cyst with punctum c/w epidermal inclusion cyst      Subcutaneous movable cyst c/w pilar cyst      Firm pink to brown papule c/w dermatofibroma      Pedunculated fleshy papule(s) c/w skin tag(s)      Evenly pigmented macule c/w junctional nevus     Mildly variegated pigmented, slightly irregular-bordered macule c/w mildly atypical nevus      Flesh colored to evenly pigmented papule c/w intradermal nevus       Pink pearly papule/plaque c/w basal cell carcinoma      Erythematous hyperkeratotic cursted plaque c/w SCC      Surgical scar with no sign of skin cancer recurrence      Open and closed comedones      Inflammatory papules and pustules      Verrucoid papule consistent consistent with wart     Erythematous eczematous patches and plaques     Dystrophic onycholytic nail with subungual debris c/w onychomycosis     Umbilicated papule    Erythematous-base heme-crusted tan verrucoid plaque consistent with inflamed seborrheic keratosis     Erythematous Silvery Scaling Plaque c/w Psoriasis     See annotation      Assessment / Plan:        Dermatitis  Ddx: eczematous vs. other  Intermittent flares, improves with TAC 0.1%. Agree to continue TAC bid prn flares. Encouraged strict sensitive skin regimen. Discussed biopsy, but pt declines in favor of conservative management. Discussed journaling as potential ACD considered. Reconsider patch testing in future.    Scalp pruritus  Improving. Continue fluocinolone solution bid prn.            Follow up in about 3 months (around 3/16/2021) for dermatitis.

## 2021-01-04 ENCOUNTER — OFFICE VISIT (OUTPATIENT)
Dept: PRIMARY CARE CLINIC | Facility: CLINIC | Age: 71
End: 2021-01-04
Payer: MEDICARE

## 2021-01-04 VITALS
SYSTOLIC BLOOD PRESSURE: 120 MMHG | OXYGEN SATURATION: 98 % | DIASTOLIC BLOOD PRESSURE: 80 MMHG | TEMPERATURE: 98 F | HEART RATE: 69 BPM | WEIGHT: 169.19 LBS | BODY MASS INDEX: 34.17 KG/M2

## 2021-01-04 DIAGNOSIS — M81.0 AGE-RELATED OSTEOPOROSIS WITHOUT CURRENT PATHOLOGICAL FRACTURE: ICD-10-CM

## 2021-01-04 DIAGNOSIS — E03.9 HYPOTHYROIDISM, UNSPECIFIED TYPE: ICD-10-CM

## 2021-01-04 DIAGNOSIS — R73.09 ABNORMAL GLUCOSE: ICD-10-CM

## 2021-01-04 DIAGNOSIS — E55.9 VITAMIN D DEFICIENCY DISEASE: ICD-10-CM

## 2021-01-04 DIAGNOSIS — M46.1 SACROILIITIS: ICD-10-CM

## 2021-01-04 DIAGNOSIS — L29.9 SEVERE ITCHING: Primary | ICD-10-CM

## 2021-01-04 DIAGNOSIS — E78.5 HYPERLIPIDEMIA, UNSPECIFIED HYPERLIPIDEMIA TYPE: ICD-10-CM

## 2021-01-04 DIAGNOSIS — Z00.00 ROUTINE GENERAL MEDICAL EXAMINATION AT A HEALTH CARE FACILITY: ICD-10-CM

## 2021-01-04 DIAGNOSIS — Z12.31 ENCOUNTER FOR SCREENING MAMMOGRAM FOR BREAST CANCER: ICD-10-CM

## 2021-01-04 PROCEDURE — 99214 OFFICE O/P EST MOD 30 MIN: CPT | Mod: S$PBB,,, | Performed by: FAMILY MEDICINE

## 2021-01-04 PROCEDURE — 99999 PR PBB SHADOW E&M-EST. PATIENT-LVL IV: ICD-10-PCS | Mod: PBBFAC,,, | Performed by: FAMILY MEDICINE

## 2021-01-04 PROCEDURE — 99214 PR OFFICE/OUTPT VISIT, EST, LEVL IV, 30-39 MIN: ICD-10-PCS | Mod: S$PBB,,, | Performed by: FAMILY MEDICINE

## 2021-01-04 PROCEDURE — 99999 PR PBB SHADOW E&M-EST. PATIENT-LVL IV: CPT | Mod: PBBFAC,,, | Performed by: FAMILY MEDICINE

## 2021-01-04 PROCEDURE — 99214 OFFICE O/P EST MOD 30 MIN: CPT | Mod: PBBFAC,PN | Performed by: FAMILY MEDICINE

## 2021-01-04 RX ORDER — LEVOTHYROXINE SODIUM 50 UG/1
TABLET ORAL
COMMUNITY
Start: 2020-01-28 | End: 2021-04-30 | Stop reason: SDUPTHER

## 2021-01-04 RX ORDER — LEVOTHYROXINE SODIUM 50 UG/1
50 TABLET ORAL DAILY
Qty: 90 TABLET | Refills: 3 | Status: SHIPPED | OUTPATIENT
Start: 2021-01-04 | End: 2021-04-30 | Stop reason: SDUPTHER

## 2021-01-04 RX ORDER — TRIAMCINOLONE ACETONIDE 1 MG/G
CREAM TOPICAL
COMMUNITY
Start: 2020-11-11 | End: 2021-01-06 | Stop reason: SDUPTHER

## 2021-01-05 ENCOUNTER — LAB VISIT (OUTPATIENT)
Dept: LAB | Facility: HOSPITAL | Age: 71
End: 2021-01-05
Attending: FAMILY MEDICINE
Payer: MEDICARE

## 2021-01-05 DIAGNOSIS — R73.09 ABNORMAL GLUCOSE: ICD-10-CM

## 2021-01-05 DIAGNOSIS — E03.9 HYPOTHYROIDISM, UNSPECIFIED TYPE: ICD-10-CM

## 2021-01-05 DIAGNOSIS — E78.5 HYPERLIPIDEMIA, UNSPECIFIED HYPERLIPIDEMIA TYPE: ICD-10-CM

## 2021-01-05 DIAGNOSIS — E55.9 VITAMIN D DEFICIENCY DISEASE: ICD-10-CM

## 2021-01-05 DIAGNOSIS — Z00.00 ROUTINE GENERAL MEDICAL EXAMINATION AT A HEALTH CARE FACILITY: ICD-10-CM

## 2021-01-05 DIAGNOSIS — M81.0 AGE-RELATED OSTEOPOROSIS WITHOUT CURRENT PATHOLOGICAL FRACTURE: ICD-10-CM

## 2021-01-05 LAB
ALBUMIN SERPL BCP-MCNC: 3.8 G/DL (ref 3.5–5.2)
ALP SERPL-CCNC: 70 U/L (ref 55–135)
ALT SERPL W/O P-5'-P-CCNC: 19 U/L (ref 10–44)
ANION GAP SERPL CALC-SCNC: 9 MMOL/L (ref 8–16)
AST SERPL-CCNC: 24 U/L (ref 10–40)
BASOPHILS # BLD AUTO: 0.04 K/UL (ref 0–0.2)
BASOPHILS NFR BLD: 0.8 % (ref 0–1.9)
BILIRUB SERPL-MCNC: 0.8 MG/DL (ref 0.1–1)
BUN SERPL-MCNC: 19 MG/DL (ref 8–23)
CALCIUM SERPL-MCNC: 9.1 MG/DL (ref 8.7–10.5)
CHLORIDE SERPL-SCNC: 104 MMOL/L (ref 95–110)
CHOLEST SERPL-MCNC: 191 MG/DL (ref 120–199)
CHOLEST/HDLC SERPL: 2.2 {RATIO} (ref 2–5)
CO2 SERPL-SCNC: 27 MMOL/L (ref 23–29)
CREAT SERPL-MCNC: 1 MG/DL (ref 0.5–1.4)
DIFFERENTIAL METHOD: ABNORMAL
EOSINOPHIL # BLD AUTO: 0.3 K/UL (ref 0–0.5)
EOSINOPHIL NFR BLD: 6.1 % (ref 0–8)
ERYTHROCYTE [DISTWIDTH] IN BLOOD BY AUTOMATED COUNT: 12.4 % (ref 11.5–14.5)
EST. GFR  (AFRICAN AMERICAN): >60 ML/MIN/1.73 M^2
EST. GFR  (NON AFRICAN AMERICAN): 57.6 ML/MIN/1.73 M^2
GLUCOSE SERPL-MCNC: 89 MG/DL (ref 70–110)
HCT VFR BLD AUTO: 41.5 % (ref 37–48.5)
HDLC SERPL-MCNC: 86 MG/DL (ref 40–75)
HDLC SERPL: 45 % (ref 20–50)
HGB BLD-MCNC: 13.6 G/DL (ref 12–16)
IMM GRANULOCYTES # BLD AUTO: 0.01 K/UL (ref 0–0.04)
IMM GRANULOCYTES NFR BLD AUTO: 0.2 % (ref 0–0.5)
LDLC SERPL CALC-MCNC: 94.8 MG/DL (ref 63–159)
LYMPHOCYTES # BLD AUTO: 1.3 K/UL (ref 1–4.8)
LYMPHOCYTES NFR BLD: 26.9 % (ref 18–48)
MCH RBC QN AUTO: 31.3 PG (ref 27–31)
MCHC RBC AUTO-ENTMCNC: 32.8 G/DL (ref 32–36)
MCV RBC AUTO: 95 FL (ref 82–98)
MONOCYTES # BLD AUTO: 0.3 K/UL (ref 0.3–1)
MONOCYTES NFR BLD: 7 % (ref 4–15)
NEUTROPHILS # BLD AUTO: 2.8 K/UL (ref 1.8–7.7)
NEUTROPHILS NFR BLD: 59 % (ref 38–73)
NONHDLC SERPL-MCNC: 105 MG/DL
NRBC BLD-RTO: 0 /100 WBC
PLATELET # BLD AUTO: 185 K/UL (ref 150–350)
PMV BLD AUTO: 10.7 FL (ref 9.2–12.9)
POTASSIUM SERPL-SCNC: 4.6 MMOL/L (ref 3.5–5.1)
PROT SERPL-MCNC: 6.9 G/DL (ref 6–8.4)
RBC # BLD AUTO: 4.35 M/UL (ref 4–5.4)
SODIUM SERPL-SCNC: 140 MMOL/L (ref 136–145)
T4 FREE SERPL-MCNC: 1.12 NG/DL (ref 0.71–1.51)
TRIGL SERPL-MCNC: 51 MG/DL (ref 30–150)
TSH SERPL DL<=0.005 MIU/L-ACNC: 2.67 UIU/ML (ref 0.4–4)
WBC # BLD AUTO: 4.72 K/UL (ref 3.9–12.7)

## 2021-01-05 PROCEDURE — 84439 ASSAY OF FREE THYROXINE: CPT

## 2021-01-05 PROCEDURE — 83036 HEMOGLOBIN GLYCOSYLATED A1C: CPT

## 2021-01-05 PROCEDURE — 84443 ASSAY THYROID STIM HORMONE: CPT

## 2021-01-05 PROCEDURE — 85025 COMPLETE CBC W/AUTO DIFF WBC: CPT

## 2021-01-05 PROCEDURE — 36415 COLL VENOUS BLD VENIPUNCTURE: CPT | Mod: PO

## 2021-01-05 PROCEDURE — 80053 COMPREHEN METABOLIC PANEL: CPT

## 2021-01-05 PROCEDURE — 80061 LIPID PANEL: CPT

## 2021-01-06 ENCOUNTER — OFFICE VISIT (OUTPATIENT)
Dept: DERMATOLOGY | Facility: CLINIC | Age: 71
End: 2021-01-06
Payer: MEDICARE

## 2021-01-06 DIAGNOSIS — L30.9 DERMATITIS: ICD-10-CM

## 2021-01-06 LAB
ESTIMATED AVG GLUCOSE: 103 MG/DL (ref 68–131)
HBA1C MFR BLD HPLC: 5.2 % (ref 4–5.6)

## 2021-01-06 PROCEDURE — 99213 OFFICE O/P EST LOW 20 MIN: CPT | Mod: S$PBB,,, | Performed by: PHYSICIAN ASSISTANT

## 2021-01-06 PROCEDURE — 99213 PR OFFICE/OUTPT VISIT, EST, LEVL III, 20-29 MIN: ICD-10-PCS | Mod: S$PBB,,, | Performed by: PHYSICIAN ASSISTANT

## 2021-01-06 PROCEDURE — 99999 PR PBB SHADOW E&M-EST. PATIENT-LVL III: ICD-10-PCS | Mod: PBBFAC,,, | Performed by: PHYSICIAN ASSISTANT

## 2021-01-06 PROCEDURE — 99999 PR PBB SHADOW E&M-EST. PATIENT-LVL III: CPT | Mod: PBBFAC,,, | Performed by: PHYSICIAN ASSISTANT

## 2021-01-06 PROCEDURE — 99213 OFFICE O/P EST LOW 20 MIN: CPT | Mod: PBBFAC,PO | Performed by: PHYSICIAN ASSISTANT

## 2021-01-06 RX ORDER — TRIAMCINOLONE ACETONIDE 1 MG/G
CREAM TOPICAL 2 TIMES DAILY
Qty: 80 G | Refills: 0 | Status: SHIPPED | OUTPATIENT
Start: 2021-01-06 | End: 2022-02-08 | Stop reason: SDUPTHER

## 2021-01-06 RX ORDER — TRIAMCINOLONE ACETONIDE 1 MG/G
CREAM TOPICAL 2 TIMES DAILY
Qty: 80 G | Refills: 1 | Status: SHIPPED | OUTPATIENT
Start: 2021-01-06 | End: 2023-08-17 | Stop reason: SDUPTHER

## 2021-01-12 ENCOUNTER — HOSPITAL ENCOUNTER (OUTPATIENT)
Dept: RADIOLOGY | Facility: HOSPITAL | Age: 71
Discharge: HOME | End: 2021-01-12
Attending: NURSE PRACTITIONER
Payer: MEDICARE

## 2021-01-12 VITALS — HEIGHT: 59 IN | WEIGHT: 169.31 LBS | BODY MASS INDEX: 34.13 KG/M2

## 2021-01-12 DIAGNOSIS — Z12.31 ENCOUNTER FOR SCREENING MAMMOGRAM FOR BREAST CANCER: ICD-10-CM

## 2021-01-12 PROCEDURE — 77067 SCR MAMMO BI INCL CAD: CPT | Mod: 26,,, | Performed by: RADIOLOGY

## 2021-01-12 PROCEDURE — 77067 MAMMO DIGITAL SCREENING BILAT WITH TOMO: ICD-10-PCS | Mod: 26,,, | Performed by: RADIOLOGY

## 2021-01-12 PROCEDURE — 77067 SCR MAMMO BI INCL CAD: CPT | Mod: TC

## 2021-01-12 PROCEDURE — 77063 BREAST TOMOSYNTHESIS BI: CPT | Mod: 26,,, | Performed by: RADIOLOGY

## 2021-01-12 PROCEDURE — 77063 MAMMO DIGITAL SCREENING BILAT WITH TOMO: ICD-10-PCS | Mod: 26,,, | Performed by: RADIOLOGY

## 2021-01-19 ENCOUNTER — PATIENT MESSAGE (OUTPATIENT)
Dept: PRIMARY CARE CLINIC | Facility: CLINIC | Age: 71
End: 2021-01-19

## 2021-01-21 ENCOUNTER — OFFICE VISIT (OUTPATIENT)
Dept: PRIMARY CARE CLINIC | Facility: CLINIC | Age: 71
End: 2021-01-21
Payer: MEDICARE

## 2021-01-21 DIAGNOSIS — R21 RASH: Primary | ICD-10-CM

## 2021-01-21 PROCEDURE — 99214 PR OFFICE/OUTPT VISIT, EST, LEVL IV, 30-39 MIN: ICD-10-PCS | Mod: 95,,, | Performed by: PHYSICIAN ASSISTANT

## 2021-01-21 PROCEDURE — 99214 OFFICE O/P EST MOD 30 MIN: CPT | Mod: 95,,, | Performed by: PHYSICIAN ASSISTANT

## 2021-01-21 RX ORDER — DOXYCYCLINE 100 MG/1
100 CAPSULE ORAL EVERY 12 HOURS
Qty: 20 CAPSULE | Refills: 0 | Status: SHIPPED | OUTPATIENT
Start: 2021-01-21 | End: 2021-01-31

## 2021-03-10 ENCOUNTER — PATIENT MESSAGE (OUTPATIENT)
Dept: PRIMARY CARE CLINIC | Facility: CLINIC | Age: 71
End: 2021-03-10

## 2021-03-13 ENCOUNTER — IMMUNIZATION (OUTPATIENT)
Dept: INTERNAL MEDICINE | Facility: CLINIC | Age: 71
End: 2021-03-13
Payer: MEDICARE

## 2021-03-13 DIAGNOSIS — Z23 NEED FOR VACCINATION: Primary | ICD-10-CM

## 2021-03-13 PROCEDURE — 91300 COVID-19, MRNA, LNP-S, PF, 30 MCG/0.3 ML DOSE VACCINE: CPT | Mod: PBBFAC | Performed by: FAMILY MEDICINE

## 2021-04-03 ENCOUNTER — IMMUNIZATION (OUTPATIENT)
Dept: INTERNAL MEDICINE | Facility: CLINIC | Age: 71
End: 2021-04-03
Payer: MEDICARE

## 2021-04-03 DIAGNOSIS — Z23 NEED FOR VACCINATION: Primary | ICD-10-CM

## 2021-04-03 PROCEDURE — 0002A COVID-19, MRNA, LNP-S, PF, 30 MCG/0.3 ML DOSE VACCINE: CPT | Mod: PBBFAC | Performed by: FAMILY MEDICINE

## 2021-04-03 PROCEDURE — 91300 COVID-19, MRNA, LNP-S, PF, 30 MCG/0.3 ML DOSE VACCINE: CPT | Mod: PBBFAC | Performed by: FAMILY MEDICINE

## 2021-04-30 ENCOUNTER — PATIENT MESSAGE (OUTPATIENT)
Dept: PRIMARY CARE CLINIC | Facility: CLINIC | Age: 71
End: 2021-04-30

## 2021-04-30 RX ORDER — LEVOTHYROXINE SODIUM 50 UG/1
50 TABLET ORAL DAILY
Qty: 90 TABLET | Refills: 3 | Status: SHIPPED | OUTPATIENT
Start: 2021-04-30 | End: 2022-02-08 | Stop reason: SDUPTHER

## 2021-04-30 RX ORDER — LEVOTHYROXINE SODIUM 50 UG/1
50 TABLET ORAL
Qty: 30 TABLET | Refills: 11 | Status: SHIPPED | OUTPATIENT
Start: 2021-04-30 | End: 2021-08-06 | Stop reason: SDUPTHER

## 2021-06-01 ENCOUNTER — PATIENT MESSAGE (OUTPATIENT)
Dept: PRIMARY CARE CLINIC | Facility: CLINIC | Age: 71
End: 2021-06-01

## 2021-06-14 ENCOUNTER — PATIENT OUTREACH (OUTPATIENT)
Dept: ADMINISTRATIVE | Facility: HOSPITAL | Age: 71
End: 2021-06-14

## 2021-07-14 ENCOUNTER — PATIENT OUTREACH (OUTPATIENT)
Dept: ADMINISTRATIVE | Facility: HOSPITAL | Age: 71
End: 2021-07-14

## 2021-08-04 ENCOUNTER — TELEPHONE (OUTPATIENT)
Dept: PRIMARY CARE CLINIC | Facility: CLINIC | Age: 71
End: 2021-08-04

## 2021-08-06 RX ORDER — LEVOTHYROXINE SODIUM 50 UG/1
50 TABLET ORAL
Qty: 30 TABLET | Refills: 11 | Status: SHIPPED | OUTPATIENT
Start: 2021-08-06 | End: 2022-02-08 | Stop reason: SDUPTHER

## 2021-09-05 ENCOUNTER — PATIENT MESSAGE (OUTPATIENT)
Dept: PRIMARY CARE CLINIC | Facility: CLINIC | Age: 71
End: 2021-09-05

## 2021-09-10 ENCOUNTER — PATIENT OUTREACH (OUTPATIENT)
Dept: ADMINISTRATIVE | Facility: HOSPITAL | Age: 71
End: 2021-09-10

## 2021-10-08 ENCOUNTER — IMMUNIZATION (OUTPATIENT)
Dept: PRIMARY CARE CLINIC | Facility: CLINIC | Age: 71
End: 2021-10-08
Payer: MEDICARE

## 2021-10-08 DIAGNOSIS — Z23 NEED FOR VACCINATION: Primary | ICD-10-CM

## 2021-10-08 PROCEDURE — 91300 COVID-19, MRNA, LNP-S, PF, 30 MCG/0.3 ML DOSE VACCINE: CPT | Mod: PBBFAC | Performed by: FAMILY MEDICINE

## 2021-10-08 PROCEDURE — 0003A COVID-19, MRNA, LNP-S, PF, 30 MCG/0.3 ML DOSE VACCINE: CPT | Mod: CV19,PBBFAC | Performed by: FAMILY MEDICINE

## 2021-11-16 ENCOUNTER — PES CALL (OUTPATIENT)
Dept: ADMINISTRATIVE | Facility: CLINIC | Age: 71
End: 2021-11-16
Payer: MEDICARE

## 2021-11-22 ENCOUNTER — PATIENT MESSAGE (OUTPATIENT)
Dept: PRIMARY CARE CLINIC | Facility: CLINIC | Age: 71
End: 2021-11-22
Payer: MEDICARE

## 2021-11-23 RX ORDER — SCOLOPAMINE TRANSDERMAL SYSTEM 1 MG/1
1 PATCH, EXTENDED RELEASE TRANSDERMAL
Qty: 6 PATCH | Refills: 0 | Status: SHIPPED | OUTPATIENT
Start: 2021-11-23 | End: 2022-02-08 | Stop reason: ALTCHOICE

## 2021-12-02 ENCOUNTER — CLINICAL SUPPORT (OUTPATIENT)
Dept: URGENT CARE | Facility: CLINIC | Age: 71
End: 2021-12-02
Payer: MEDICARE

## 2021-12-02 DIAGNOSIS — Z11.52 ENCOUNTER FOR SCREENING FOR COVID-19: Primary | ICD-10-CM

## 2021-12-02 LAB
CTP QC/QA: YES
SARS-COV-2 RDRP RESP QL NAA+PROBE: NEGATIVE

## 2021-12-02 PROCEDURE — U0002: ICD-10-PCS | Mod: QW,CR,S$GLB, | Performed by: NURSE PRACTITIONER

## 2021-12-02 PROCEDURE — 99211 OFF/OP EST MAY X REQ PHY/QHP: CPT | Mod: S$GLB,,, | Performed by: NURSE PRACTITIONER

## 2021-12-02 PROCEDURE — U0002 COVID-19 LAB TEST NON-CDC: HCPCS | Mod: QW,CR,S$GLB, | Performed by: NURSE PRACTITIONER

## 2021-12-02 PROCEDURE — 99211 PR OFFICE/OUTPT VISIT, EST, LEVL I: ICD-10-PCS | Mod: S$GLB,,, | Performed by: NURSE PRACTITIONER

## 2021-12-03 ENCOUNTER — NURSE TRIAGE (OUTPATIENT)
Dept: ADMINISTRATIVE | Facility: CLINIC | Age: 71
End: 2021-12-03
Payer: MEDICARE

## 2021-12-03 ENCOUNTER — PATIENT MESSAGE (OUTPATIENT)
Dept: PRIMARY CARE CLINIC | Facility: CLINIC | Age: 71
End: 2021-12-03
Payer: MEDICARE

## 2021-12-17 ENCOUNTER — OFFICE VISIT (OUTPATIENT)
Dept: INTERNAL MEDICINE | Facility: CLINIC | Age: 71
End: 2021-12-17
Payer: MEDICARE

## 2021-12-17 VITALS
DIASTOLIC BLOOD PRESSURE: 80 MMHG | BODY MASS INDEX: 33.73 KG/M2 | HEART RATE: 72 BPM | TEMPERATURE: 97 F | HEIGHT: 59 IN | SYSTOLIC BLOOD PRESSURE: 128 MMHG | WEIGHT: 167.31 LBS | RESPIRATION RATE: 18 BRPM

## 2021-12-17 DIAGNOSIS — Z12.31 SCREENING MAMMOGRAM, ENCOUNTER FOR: ICD-10-CM

## 2021-12-17 DIAGNOSIS — M46.1 SACROILIITIS: ICD-10-CM

## 2021-12-17 DIAGNOSIS — E03.9 HYPOTHYROIDISM, UNSPECIFIED TYPE: ICD-10-CM

## 2021-12-17 DIAGNOSIS — E78.5 HYPERLIPIDEMIA, UNSPECIFIED HYPERLIPIDEMIA TYPE: ICD-10-CM

## 2021-12-17 DIAGNOSIS — E55.9 VITAMIN D DEFICIENCY DISEASE: ICD-10-CM

## 2021-12-17 DIAGNOSIS — M81.0 OSTEOPOROSIS, UNSPECIFIED OSTEOPOROSIS TYPE, UNSPECIFIED PATHOLOGICAL FRACTURE PRESENCE: ICD-10-CM

## 2021-12-17 DIAGNOSIS — Z86.010 HISTORY OF COLON POLYPS: ICD-10-CM

## 2021-12-17 DIAGNOSIS — J30.89 NON-SEASONAL ALLERGIC RHINITIS DUE TO OTHER ALLERGIC TRIGGER: ICD-10-CM

## 2021-12-17 DIAGNOSIS — Z00.00 ENCOUNTER FOR PREVENTIVE HEALTH EXAMINATION: Primary | ICD-10-CM

## 2021-12-17 PROBLEM — Z51.81 MEDICATION MONITORING ENCOUNTER: Status: RESOLVED | Noted: 2017-02-09 | Resolved: 2021-12-17

## 2021-12-17 PROCEDURE — 99999 PR PBB SHADOW E&M-EST. PATIENT-LVL IV: CPT | Mod: PBBFAC,,, | Performed by: NURSE PRACTITIONER

## 2021-12-17 PROCEDURE — G0439 PR MEDICARE ANNUAL WELLNESS SUBSEQUENT VISIT: ICD-10-PCS | Mod: ,,, | Performed by: NURSE PRACTITIONER

## 2021-12-17 PROCEDURE — 99999 PR PBB SHADOW E&M-EST. PATIENT-LVL IV: ICD-10-PCS | Mod: PBBFAC,,, | Performed by: NURSE PRACTITIONER

## 2021-12-17 PROCEDURE — G0439 PPPS, SUBSEQ VISIT: HCPCS | Mod: ,,, | Performed by: NURSE PRACTITIONER

## 2021-12-17 PROCEDURE — 99214 OFFICE O/P EST MOD 30 MIN: CPT | Mod: PBBFAC,PO | Performed by: NURSE PRACTITIONER

## 2021-12-18 ENCOUNTER — PATIENT MESSAGE (OUTPATIENT)
Dept: PRIMARY CARE CLINIC | Facility: CLINIC | Age: 71
End: 2021-12-18
Payer: MEDICARE

## 2021-12-18 DIAGNOSIS — E78.5 HYPERLIPIDEMIA, UNSPECIFIED HYPERLIPIDEMIA TYPE: ICD-10-CM

## 2021-12-18 DIAGNOSIS — E03.9 HYPOTHYROIDISM, UNSPECIFIED TYPE: Primary | ICD-10-CM

## 2021-12-18 DIAGNOSIS — E55.9 VITAMIN D DEFICIENCY DISEASE: ICD-10-CM

## 2022-02-01 ENCOUNTER — LAB VISIT (OUTPATIENT)
Dept: LAB | Facility: HOSPITAL | Age: 72
End: 2022-02-01
Attending: FAMILY MEDICINE
Payer: MEDICARE

## 2022-02-01 ENCOUNTER — PATIENT MESSAGE (OUTPATIENT)
Dept: PRIMARY CARE CLINIC | Facility: CLINIC | Age: 72
End: 2022-02-01
Payer: MEDICARE

## 2022-02-01 DIAGNOSIS — E55.9 VITAMIN D DEFICIENCY DISEASE: ICD-10-CM

## 2022-02-01 DIAGNOSIS — E03.9 HYPOTHYROIDISM, UNSPECIFIED TYPE: ICD-10-CM

## 2022-02-01 DIAGNOSIS — E78.5 HYPERLIPIDEMIA, UNSPECIFIED HYPERLIPIDEMIA TYPE: ICD-10-CM

## 2022-02-01 LAB
ALBUMIN SERPL BCP-MCNC: 3.6 G/DL (ref 3.5–5.2)
ALP SERPL-CCNC: 63 U/L (ref 55–135)
ALT SERPL W/O P-5'-P-CCNC: 26 U/L (ref 10–44)
ANION GAP SERPL CALC-SCNC: 11 MMOL/L (ref 8–16)
AST SERPL-CCNC: 26 U/L (ref 10–40)
BASOPHILS # BLD AUTO: 0.04 K/UL (ref 0–0.2)
BASOPHILS NFR BLD: 1.2 % (ref 0–1.9)
BILIRUB SERPL-MCNC: 0.7 MG/DL (ref 0.1–1)
BUN SERPL-MCNC: 8 MG/DL (ref 8–23)
CALCIUM SERPL-MCNC: 9.4 MG/DL (ref 8.7–10.5)
CHLORIDE SERPL-SCNC: 104 MMOL/L (ref 95–110)
CHOLEST SERPL-MCNC: 173 MG/DL (ref 120–199)
CHOLEST/HDLC SERPL: 2.2 {RATIO} (ref 2–5)
CO2 SERPL-SCNC: 24 MMOL/L (ref 23–29)
CREAT SERPL-MCNC: 0.8 MG/DL (ref 0.5–1.4)
DIFFERENTIAL METHOD: ABNORMAL
EOSINOPHIL # BLD AUTO: 0.1 K/UL (ref 0–0.5)
EOSINOPHIL NFR BLD: 3 % (ref 0–8)
ERYTHROCYTE [DISTWIDTH] IN BLOOD BY AUTOMATED COUNT: 12.1 % (ref 11.5–14.5)
EST. GFR  (AFRICAN AMERICAN): >60 ML/MIN/1.73 M^2
EST. GFR  (NON AFRICAN AMERICAN): >60 ML/MIN/1.73 M^2
GLUCOSE SERPL-MCNC: 104 MG/DL (ref 70–110)
HCT VFR BLD AUTO: 40.7 % (ref 37–48.5)
HDLC SERPL-MCNC: 80 MG/DL (ref 40–75)
HDLC SERPL: 46.2 % (ref 20–50)
HGB BLD-MCNC: 13.4 G/DL (ref 12–16)
IMM GRANULOCYTES # BLD AUTO: 0.01 K/UL (ref 0–0.04)
IMM GRANULOCYTES NFR BLD AUTO: 0.3 % (ref 0–0.5)
LDLC SERPL CALC-MCNC: 82.8 MG/DL (ref 63–159)
LYMPHOCYTES # BLD AUTO: 0.9 K/UL (ref 1–4.8)
LYMPHOCYTES NFR BLD: 27.5 % (ref 18–48)
MCH RBC QN AUTO: 31.3 PG (ref 27–31)
MCHC RBC AUTO-ENTMCNC: 32.9 G/DL (ref 32–36)
MCV RBC AUTO: 95 FL (ref 82–98)
MONOCYTES # BLD AUTO: 0.3 K/UL (ref 0.3–1)
MONOCYTES NFR BLD: 7.5 % (ref 4–15)
NEUTROPHILS # BLD AUTO: 2 K/UL (ref 1.8–7.7)
NEUTROPHILS NFR BLD: 60.5 % (ref 38–73)
NONHDLC SERPL-MCNC: 93 MG/DL
NRBC BLD-RTO: 0 /100 WBC
PLATELET # BLD AUTO: 185 K/UL (ref 150–450)
PMV BLD AUTO: 10.3 FL (ref 9.2–12.9)
POTASSIUM SERPL-SCNC: 4.1 MMOL/L (ref 3.5–5.1)
PROT SERPL-MCNC: 6.6 G/DL (ref 6–8.4)
RBC # BLD AUTO: 4.28 M/UL (ref 4–5.4)
SODIUM SERPL-SCNC: 139 MMOL/L (ref 136–145)
T4 FREE SERPL-MCNC: 1.15 NG/DL (ref 0.71–1.51)
TRIGL SERPL-MCNC: 51 MG/DL (ref 30–150)
TSH SERPL DL<=0.005 MIU/L-ACNC: 3.3 UIU/ML (ref 0.4–4)
WBC # BLD AUTO: 3.35 K/UL (ref 3.9–12.7)

## 2022-02-01 PROCEDURE — 80053 COMPREHEN METABOLIC PANEL: CPT | Performed by: PHYSICIAN ASSISTANT

## 2022-02-01 PROCEDURE — 84443 ASSAY THYROID STIM HORMONE: CPT | Performed by: PHYSICIAN ASSISTANT

## 2022-02-01 PROCEDURE — 36415 COLL VENOUS BLD VENIPUNCTURE: CPT | Mod: PO | Performed by: PHYSICIAN ASSISTANT

## 2022-02-01 PROCEDURE — 85025 COMPLETE CBC W/AUTO DIFF WBC: CPT | Performed by: PHYSICIAN ASSISTANT

## 2022-02-01 PROCEDURE — 80061 LIPID PANEL: CPT | Performed by: PHYSICIAN ASSISTANT

## 2022-02-01 PROCEDURE — 84439 ASSAY OF FREE THYROXINE: CPT | Performed by: PHYSICIAN ASSISTANT

## 2022-02-07 ENCOUNTER — HOSPITAL ENCOUNTER (OUTPATIENT)
Dept: RADIOLOGY | Facility: HOSPITAL | Age: 72
Discharge: HOME | End: 2022-02-07
Attending: NURSE PRACTITIONER
Payer: MEDICARE

## 2022-02-07 DIAGNOSIS — Z12.31 SCREENING MAMMOGRAM, ENCOUNTER FOR: ICD-10-CM

## 2022-02-07 PROCEDURE — 77063 MAMMO DIGITAL SCREENING BILAT WITH TOMO: ICD-10-PCS | Mod: 26,,, | Performed by: RADIOLOGY

## 2022-02-07 PROCEDURE — 77063 BREAST TOMOSYNTHESIS BI: CPT | Mod: TC

## 2022-02-07 PROCEDURE — 77067 MAMMO DIGITAL SCREENING BILAT WITH TOMO: ICD-10-PCS | Mod: 26,,, | Performed by: RADIOLOGY

## 2022-02-07 PROCEDURE — 77067 SCR MAMMO BI INCL CAD: CPT | Mod: 26,,, | Performed by: RADIOLOGY

## 2022-02-07 PROCEDURE — 77063 BREAST TOMOSYNTHESIS BI: CPT | Mod: 26,,, | Performed by: RADIOLOGY

## 2022-02-08 ENCOUNTER — TELEPHONE (OUTPATIENT)
Dept: ORTHOPEDICS | Facility: CLINIC | Age: 72
End: 2022-02-08
Payer: MEDICARE

## 2022-02-08 ENCOUNTER — OFFICE VISIT (OUTPATIENT)
Dept: PRIMARY CARE CLINIC | Facility: CLINIC | Age: 72
End: 2022-02-08
Payer: MEDICARE

## 2022-02-08 VITALS
SYSTOLIC BLOOD PRESSURE: 126 MMHG | OXYGEN SATURATION: 96 % | HEART RATE: 71 BPM | DIASTOLIC BLOOD PRESSURE: 78 MMHG | BODY MASS INDEX: 33.86 KG/M2 | TEMPERATURE: 97 F | WEIGHT: 167.69 LBS

## 2022-02-08 DIAGNOSIS — G47.9 SLEEP DISORDER: ICD-10-CM

## 2022-02-08 DIAGNOSIS — S62.102D CLOSED FRACTURE OF LEFT WRIST WITH ROUTINE HEALING, SUBSEQUENT ENCOUNTER: ICD-10-CM

## 2022-02-08 DIAGNOSIS — B00.1 FEVER BLISTER: ICD-10-CM

## 2022-02-08 DIAGNOSIS — E55.9 VITAMIN D DEFICIENCY DISEASE: ICD-10-CM

## 2022-02-08 DIAGNOSIS — M81.0 AGE-RELATED OSTEOPOROSIS WITHOUT CURRENT PATHOLOGICAL FRACTURE: ICD-10-CM

## 2022-02-08 DIAGNOSIS — M46.1 SACROILIITIS: ICD-10-CM

## 2022-02-08 DIAGNOSIS — E78.5 HYPERLIPIDEMIA, UNSPECIFIED HYPERLIPIDEMIA TYPE: Primary | ICD-10-CM

## 2022-02-08 DIAGNOSIS — E03.9 HYPOTHYROIDISM, UNSPECIFIED TYPE: ICD-10-CM

## 2022-02-08 PROBLEM — S52.90XA FRACTURE OF RADIUS: Status: ACTIVE | Noted: 2018-09-07

## 2022-02-08 PROBLEM — J30.9 ALLERGIC RHINITIS: Status: ACTIVE | Noted: 2018-09-07

## 2022-02-08 PROCEDURE — 99999 PR PBB SHADOW E&M-EST. PATIENT-LVL IV: CPT | Mod: PBBFAC,,, | Performed by: FAMILY MEDICINE

## 2022-02-08 PROCEDURE — 99214 OFFICE O/P EST MOD 30 MIN: CPT | Mod: PBBFAC,PN | Performed by: FAMILY MEDICINE

## 2022-02-08 PROCEDURE — 99214 OFFICE O/P EST MOD 30 MIN: CPT | Mod: S$PBB,,, | Performed by: FAMILY MEDICINE

## 2022-02-08 PROCEDURE — 99999 PR PBB SHADOW E&M-EST. PATIENT-LVL IV: ICD-10-PCS | Mod: PBBFAC,,, | Performed by: FAMILY MEDICINE

## 2022-02-08 PROCEDURE — 99214 PR OFFICE/OUTPT VISIT, EST, LEVL IV, 30-39 MIN: ICD-10-PCS | Mod: S$PBB,,, | Performed by: FAMILY MEDICINE

## 2022-02-08 RX ORDER — CLOBETASOL PROPIONATE 0.46 MG/ML
SOLUTION TOPICAL
COMMUNITY
Start: 2021-08-24 | End: 2023-09-28 | Stop reason: SDUPTHER

## 2022-02-08 RX ORDER — MUPIROCIN 20 MG/G
OINTMENT TOPICAL
COMMUNITY
Start: 2021-08-24 | End: 2022-02-08 | Stop reason: SDUPTHER

## 2022-02-08 RX ORDER — LEVOTHYROXINE SODIUM 50 UG/1
50 TABLET ORAL
Qty: 90 TABLET | Refills: 3 | Status: SHIPPED | OUTPATIENT
Start: 2022-02-08 | End: 2023-03-14

## 2022-02-08 RX ORDER — CLOBETASOL PROPIONATE 0.46 MG/ML
SOLUTION TOPICAL
COMMUNITY
Start: 2021-08-24

## 2022-02-08 RX ORDER — ACYCLOVIR 400 MG/1
TABLET ORAL
Qty: 180 TABLET | Refills: 3 | Status: SHIPPED | OUTPATIENT
Start: 2022-02-08 | End: 2023-08-17 | Stop reason: SDUPTHER

## 2022-02-08 NOTE — PATIENT INSTRUCTIONS
REMFresh- lite -1mg/ 0.5mg melatonin  2mg and cut in half - [facility]3], [facility], [employer],

## 2022-02-08 NOTE — PROGRESS NOTES
Subjective:      Patient ID: Rajni Melchor is a 72 y.o. female.    Chief Complaint: Annual Exam, Insomnia, and Hand Pain    Disclaimer:  This note is prepared using voice recognition software and as such is likely to have errors and has not been proof read. Please contact me for questions.     Rajni Melchor is a 71 y.o. female who presents today  For prevention exam .   Doing well but recently tried to pull up her pants and feels like she may have sprained her injured her left wrist which previously had a fracture a few years ago and had to have a plate placed for radial fracture.  She states it happened 2 days ago.  She has not done anything thus far to help that out.  Willing to try some Tylenol or ibuprofen.    Prior.  [facility]    Did recently a mammogram which was normal also recently did a bone density study which still shows osteoporosis but no significant change from prior years.  Previously she had done per week but really did not see any improvement in her bone density scores after 5-6 injections so she stopped.    She is still doing quite well with her levothyroxine at 50 micrograms.  Sometimes struggles to sleep has tried a mill loop sleep a bit has to take a quarter of a tablet and still feels a little bit groggy.  We did discuss low-dose melatonin as another option for her.      Lab Results       Component                Value               Date                       HGBA1C                   5.2                 01/05/2021                 HGBA1C                   5.5                 01/23/2013             Lab Results       Component                Value               Date                       CHOL                     173                 02/01/2022                 CHOL                     191                 01/05/2021                 CHOL                     195                 01/06/2020            Lab Results       Component                Value                Date                       LDLCALC                  82.8                02/01/2022                 LDLCALC                  94.8                01/05/2021                 LDLCALC                  97.0                01/06/2020              Wt Readings from Last 10 Encounters:  02/08/22 : 76.1 kg (167 lb 10.6 oz)  12/17/21 : 75.9 kg (167 lb 5.3 oz)  01/12/21 : 76.8 kg (169 lb 5 oz)  01/04/21 : 76.8 kg (169 lb 3.3 oz)  11/09/20 : 74.3 kg (163 lb 12.8 oz)  01/28/20 : 74.8 kg (164 lb 14.5 oz)  11/12/19 : 73.3 kg (161 lb 9.6 oz)  11/07/19 : 73.9 kg (162 lb 14.7 oz)  10/14/19 : 73.8 kg (162 lb 11.2 oz)  06/08/19 : 75.1 kg (165 lb 9.1 oz)      The 10-year ASCVD risk score (Presley MONAE Jr., et al., 2013) is: 9.3%    Values used to calculate the score:      Age: 72 years      Sex: Female      Is Non- : No      Diabetic: No      Tobacco smoker: No      Systolic Blood Pressure: 126 mmHg      Is BP treated: No      HDL Cholesterol: 80 mg/dL      Total Cholesterol: 173 mg/dL            Lab Results   Component Value Date    WBC 3.35 (L) 02/01/2022    HGB 13.4 02/01/2022    HCT 40.7 02/01/2022     02/01/2022    CHOL 173 02/01/2022    TRIG 51 02/01/2022    HDL 80 (H) 02/01/2022    ALT 26 02/01/2022    AST 26 02/01/2022     02/01/2022    K 4.1 02/01/2022     02/01/2022    CREATININE 0.8 02/01/2022    BUN 8 02/01/2022    CO2 24 02/01/2022    TSH 3.302 02/01/2022    HGBA1C 5.2 01/05/2021       DXA Bone Density Spine And Hip  Narrative: EXAMINATION:  DEXA BONE DENSITY SPINE HIP    CLINICAL HISTORY:  Age-related osteoporosis without current pathological fracture    TECHNIQUE:  DXA scanning was performed over the left hip and lumbar spine.  Review of the images confirms satisfactory positioning and technique.    COMPARISON:  November 7, 2019    FINDINGS:  The L1 to L4 vertebral bone mineral density is equal to 0.949 g/cm squared with a T score of -2.0.  There has been no significant change  relative to the prior study.    The left femoral neck bone mineral density is equal to 0.624 g/cm squared with a T score of -3.0.  There has been  no significant change relative to the prior study.    The total hip bone mineral density is equal to 0.768 g/cm squared with a T score of -1.9.  Impression: Osteoporosis    Consider FDA approved medical therapies in postmenopausal women and men aged 50 years and older, based on the following:    *A hip or vertebral (clinical or morphometric) fracture  *T score less than or equal to -2.5 at the femoral neck or spine after appropriate evaluation to exclude secondary causes.  *Low bone mass -- also known as osteopenia (T score between -1.0 and -2.5 at the femoral neck or spine) and a 10 year probability of hip fracture greater than or equal to 3% or a 10 year probability of major osteoporosis-related fracture greater than or equal to 20% based on the [place]-adapted WHO algorithm.  *Clinicians judgment and/or patient preference may indicate treatment for people with 10 year fracture probabilities is above or below these levels.    Electronically signed by: Brad Herrera  Date:    02/07/2022  Time:    13:51  Mammo Digital Screening Bilat w/ Edilberto  Narrative: Result:  Mammo Digital Screening Bilat w/ Edilberto    History:  Patient is 72 y.o. and is seen for a screening mammogram.    Films Compared:  Prior images (if available) were compared.     Findings:   This procedure was performed using tomosynthesis.   Computer-aided detection was utilized in the interpretation of this   examination.    The breasts are heterogeneously dense, which may obscure small masses.   There is no evidence of suspicious masses, microcalcifications or   architectural distortion.  Impression:    No mammographic evidence of malignancy.    BI-RADS Category 1: Negative    Recommendation:  Routine screening mammogram in 1 year is recommended.    Your estimated lifetime risk of breast cancer (to age 85) based on    Tyrer-Cuzick risk assessment model is 3.78 %.  According to the American   Cancer Society, patients with a lifetime breast cancer risk of 20% or   higher might benefit from supplemental screening tests. ??         Review of Systems   Constitutional: Negative for chills, fatigue and fever.   HENT: Negative for ear pain and trouble swallowing.    Eyes: Negative for pain and visual disturbance.   Respiratory: Negative for cough and shortness of breath.    Cardiovascular: Negative for chest pain and leg swelling.   Gastrointestinal: Negative for abdominal pain, blood in stool, nausea and vomiting.   Endocrine: Negative for cold intolerance and heat intolerance.   Genitourinary: Negative for dysuria and frequency.   Musculoskeletal: Positive for arthralgias. Negative for joint swelling, myalgias and neck pain.   Skin: Negative for color change and rash.   Neurological: Negative for dizziness and headaches.   Psychiatric/Behavioral: Negative for behavioral problems and sleep disturbance.     Objective:     Vitals:    02/08/22 0920   BP: 126/78   Pulse: 71   Temp: 97.3 °F (36.3 °C)   SpO2: 96%   Weight: 76.1 kg (167 lb 10.6 oz)     Physical Exam  Vitals reviewed.   Constitutional:       Appearance: Normal appearance. She is well-developed. She is obese.   HENT:      Head: Normocephalic and atraumatic.      Right Ear: Tympanic membrane and external ear normal.      Left Ear: Tympanic membrane and external ear normal.      Nose: Nose normal.      Mouth/Throat:      Mouth: Mucous membranes are moist.      Pharynx: Oropharynx is clear.   Eyes:      Conjunctiva/sclera: Conjunctivae normal.      Pupils: Pupils are equal, round, and reactive to light.   Neck:      Thyroid: No thyromegaly.   Cardiovascular:      Rate and Rhythm: Normal rate and regular rhythm.      Heart sounds: No murmur heard.  No friction rub. No gallop.    Pulmonary:      Effort: Pulmonary effort is normal. No respiratory distress.      Breath sounds: No  wheezing or rales.   Abdominal:      General: Bowel sounds are normal. There is no distension.      Palpations: Abdomen is soft.      Tenderness: There is no abdominal tenderness. There is no rebound.   Musculoskeletal:         General: Normal range of motion.      Cervical back: Normal range of motion and neck supple.   Lymphadenopathy:      Cervical: No cervical adenopathy.   Skin:     General: Skin is warm and dry.      Findings: No rash.   Neurological:      Mental Status: She is alert and oriented to person, place, and time.   Psychiatric:         Attention and Perception: Attention and perception normal.         Mood and Affect: Mood and affect normal.         Speech: Speech normal.         Behavior: Behavior normal.         Thought Content: Thought content normal.         Cognition and Memory: Cognition and memory normal.         Judgment: Judgment normal.       Assessment:     1. Hyperlipidemia, unspecified hyperlipidemia type    2. Sleep disorder    3. Hypothyroidism, unspecified type    4. Vitamin D deficiency disease    5. Age-related osteoporosis without current pathological fracture    6. Closed fracture of left wrist with routine healing, subsequent encounter    7. Sacroiliitis    8. Fever blister      Plan:   Rajni was seen today for annual exam, insomnia and hand pain.    Diagnoses and all orders for this visit:    Hyperlipidemia, unspecified hyperlipidemia type-stable this time declines medications continue with diet exercise    Sleep disorder-trial of low-dose melatonin such as 1 or 2 milligrams at night.  Discussed type she can give him over-the-counter    Hypothyroidism, unspecified type - stable, Continue with current medications and interventions. Labs reviewed.       Vitamin D deficiency disease - stable, Continue with current medications and interventions. Labs reviewed.       Age-related osteoporosis without current pathological fracture - stable, Continue with current medications and  interventions. Labs reviewed.   Declines wanting to do prolia again, not affective in past.     Closed fracture of left wrist with routine healing, subsequent encounter- advised patient to try ice rest and compression possibly even use of Tylenol and ibuprofen for the rest of this week if not improving then can see Orthopedics for additional workup since she has hardware in place will possibly need x-rays prior place referral for her.  -     Ambulatory referral/consult to Orthopedics; Future    Sacroiliitis-stable at this time    History of fever blisters-worse around the holidays recommend possibly using for preventative in the month of December 1 pill twice daily and can increase up to 5 times daily if has any acute outbreak sent and more for the patient    Other orders  -     acyclovir (ZOVIRAX) 400 MG tablet; TAKE ONE TABLET BY MOUTH BID to prevent fever blister, but can increase to 5 TIMES A DAY FOR 3-5 DAYS FOR FLARE UP FEVER BLISTERS.  -     levothyroxine (SYNTHROID) 50 MCG tablet; Take 1 tablet (50 mcg total) by mouth before breakfast.            Follow up in about 1 year (around 2/8/2023) for physical with Dr HARDING.    Patient Instructions   REMFresh- lite -1mg/ 0.5mg melatonin  2mg and [facility] in [facility] [employer],

## 2022-02-14 DIAGNOSIS — M25.532 LEFT WRIST PAIN: Primary | ICD-10-CM

## 2022-04-13 ENCOUNTER — PATIENT MESSAGE (OUTPATIENT)
Dept: PRIMARY CARE CLINIC | Facility: CLINIC | Age: 72
End: 2022-04-13
Payer: MEDICARE

## 2022-06-02 ENCOUNTER — PATIENT MESSAGE (OUTPATIENT)
Dept: PRIMARY CARE CLINIC | Facility: CLINIC | Age: 72
End: 2022-06-02
Payer: MEDICARE

## 2022-09-06 ENCOUNTER — PATIENT MESSAGE (OUTPATIENT)
Dept: PRIMARY CARE CLINIC | Facility: CLINIC | Age: 72
End: 2022-09-06
Payer: MEDICARE

## 2022-09-30 ENCOUNTER — TELEPHONE (OUTPATIENT)
Dept: ADMINISTRATIVE | Facility: HOSPITAL | Age: 72
End: 2022-09-30
Payer: MEDICARE

## 2022-12-07 ENCOUNTER — TELEPHONE (OUTPATIENT)
Dept: ADMINISTRATIVE | Facility: HOSPITAL | Age: 72
End: 2022-12-07
Payer: MEDICARE

## 2023-01-10 ENCOUNTER — PES CALL (OUTPATIENT)
Dept: ADMINISTRATIVE | Facility: CLINIC | Age: 73
End: 2023-01-10
Payer: MEDICARE

## 2023-01-13 ENCOUNTER — PES CALL (OUTPATIENT)
Dept: ADMINISTRATIVE | Facility: CLINIC | Age: 73
End: 2023-01-13
Payer: MEDICARE

## 2023-01-18 ENCOUNTER — TELEPHONE (OUTPATIENT)
Dept: ADMINISTRATIVE | Facility: CLINIC | Age: 73
End: 2023-01-18
Payer: MEDICARE

## 2023-01-20 ENCOUNTER — TELEPHONE (OUTPATIENT)
Dept: PRIMARY CARE CLINIC | Facility: CLINIC | Age: 73
End: 2023-01-20
Payer: MEDICARE

## 2023-01-20 ENCOUNTER — OFFICE VISIT (OUTPATIENT)
Dept: INTERNAL MEDICINE | Facility: CLINIC | Age: 73
End: 2023-01-20
Payer: MEDICARE

## 2023-01-20 VITALS
DIASTOLIC BLOOD PRESSURE: 78 MMHG | OXYGEN SATURATION: 97 % | BODY MASS INDEX: 32.62 KG/M2 | SYSTOLIC BLOOD PRESSURE: 128 MMHG | HEART RATE: 74 BPM | HEIGHT: 59 IN | TEMPERATURE: 98 F | WEIGHT: 161.81 LBS | RESPIRATION RATE: 18 BRPM

## 2023-01-20 DIAGNOSIS — M47.816 LUMBAR SPONDYLOSIS: ICD-10-CM

## 2023-01-20 DIAGNOSIS — Z12.31 ENCOUNTER FOR SCREENING MAMMOGRAM FOR MALIGNANT NEOPLASM OF BREAST: ICD-10-CM

## 2023-01-20 DIAGNOSIS — B00.1 FEVER BLISTER: ICD-10-CM

## 2023-01-20 DIAGNOSIS — Z00.00 ENCOUNTER FOR PREVENTIVE HEALTH EXAMINATION: Primary | ICD-10-CM

## 2023-01-20 DIAGNOSIS — H54.7 DECREASED VISION: ICD-10-CM

## 2023-01-20 DIAGNOSIS — E66.09 CLASS 1 OBESITY DUE TO EXCESS CALORIES WITHOUT SERIOUS COMORBIDITY WITH BODY MASS INDEX (BMI) OF 32.0 TO 32.9 IN ADULT: ICD-10-CM

## 2023-01-20 DIAGNOSIS — Z86.010 HISTORY OF COLON POLYPS: ICD-10-CM

## 2023-01-20 DIAGNOSIS — E55.9 VITAMIN D DEFICIENCY DISEASE: ICD-10-CM

## 2023-01-20 DIAGNOSIS — M81.0 AGE-RELATED OSTEOPOROSIS WITHOUT CURRENT PATHOLOGICAL FRACTURE: ICD-10-CM

## 2023-01-20 DIAGNOSIS — G47.9 SLEEP DISORDER: ICD-10-CM

## 2023-01-20 DIAGNOSIS — J30.2 SEASONAL ALLERGIC RHINITIS, UNSPECIFIED TRIGGER: ICD-10-CM

## 2023-01-20 DIAGNOSIS — E03.9 HYPOTHYROIDISM, UNSPECIFIED TYPE: ICD-10-CM

## 2023-01-20 DIAGNOSIS — M46.1 SACROILIITIS: ICD-10-CM

## 2023-01-20 DIAGNOSIS — Z80.0 FAMILY HISTORY OF COLON CANCER: ICD-10-CM

## 2023-01-20 PROBLEM — E66.811 CLASS 1 OBESITY WITHOUT SERIOUS COMORBIDITY WITH BODY MASS INDEX (BMI) OF 32.0 TO 32.9 IN ADULT: Status: ACTIVE | Noted: 2023-01-20

## 2023-01-20 PROBLEM — E66.9 CLASS 1 OBESITY WITHOUT SERIOUS COMORBIDITY WITH BODY MASS INDEX (BMI) OF 32.0 TO 32.9 IN ADULT: Status: ACTIVE | Noted: 2023-01-20

## 2023-01-20 PROCEDURE — 99215 OFFICE O/P EST HI 40 MIN: CPT | Mod: PBBFAC,PO | Performed by: NURSE PRACTITIONER

## 2023-01-20 PROCEDURE — G0439 PPPS, SUBSEQ VISIT: HCPCS | Mod: ,,, | Performed by: NURSE PRACTITIONER

## 2023-01-20 PROCEDURE — 99999 PR PBB SHADOW E&M-EST. PATIENT-LVL V: CPT | Mod: PBBFAC,,, | Performed by: NURSE PRACTITIONER

## 2023-01-20 PROCEDURE — 99999 PR PBB SHADOW E&M-EST. PATIENT-LVL V: ICD-10-PCS | Mod: PBBFAC,,, | Performed by: NURSE PRACTITIONER

## 2023-01-20 PROCEDURE — G0439 PR MEDICARE ANNUAL WELLNESS SUBSEQUENT VISIT: ICD-10-PCS | Mod: ,,, | Performed by: NURSE PRACTITIONER

## 2023-01-20 NOTE — TELEPHONE ENCOUNTER
----- Message from Katy Gilliam NP sent at 1/20/2023 10:43 AM CST -----  Good morning,     Mrs. Melchor was seen this morning for her Medicare AWV. She will be due for her annual exam and lab work with Dr. Benedict in February. Can your team please assist her in scheduling an appointment?    Thank you so much for your help,     MITZI Dobson

## 2023-01-20 NOTE — Clinical Note
Good morning,   Mrs. Melchor was seen this morning for her Medicare AWV. She will be due for her annual exam and lab work with Dr. Benedict in February. Can your team please assist her in scheduling an appointment?  Thank you so much for your help,   MITZI Dobson

## 2023-01-20 NOTE — PROGRESS NOTES
"  Rajni Melchor presented for a follow-up Medicare AWV today. The following components were reviewed and updated:    Medical history  Family History  Social history  Allergies and Current Medications  Health Risk Assessment  Health Maintenance  Care Team    **See Completed Assessments for Annual Wellness visit with in the encounter summary    The following assessments were completed:  Depression Screening  Cognitive function Screening  Timed Get Up Test  Whisper Test          Vitals:    01/20/23 0958   BP: 128/78   BP Location: Left arm   Patient Position: Sitting   Pulse: 74   Resp: 18   Temp: 98.1 °F (36.7 °C)   TempSrc: Temporal   SpO2: 97%   Weight: 73.4 kg (161 lb 13.1 oz)   Height: 4' 11" (1.499 m)     Body mass index is 32.68 kg/m².   ]    Physical Exam  Constitutional:       Appearance: Normal appearance. She is obese.   HENT:      Head: Normocephalic.   Cardiovascular:      Rate and Rhythm: Normal rate and regular rhythm.      Pulses: Normal pulses.      Heart sounds: Normal heart sounds.   Pulmonary:      Effort: Pulmonary effort is normal.      Breath sounds: Normal breath sounds.   Abdominal:      General: Abdomen is flat. Bowel sounds are normal.      Palpations: Abdomen is soft.   Musculoskeletal:         General: Normal range of motion.      Cervical back: Normal range of motion.   Skin:     General: Skin is warm and dry.      Capillary Refill: Capillary refill takes less than 2 seconds.   Neurological:      General: No focal deficit present.      Mental Status: She is alert and oriented to person, place, and time.   Psychiatric:         Mood and Affect: Mood normal.         Behavior: Behavior normal.         Thought Content: Thought content normal.         Judgment: Judgment normal.       Diagnoses and health risks identified today and associated recommendations/orders:  1. Encounter for preventive health examination  Reviewed and discussed preventive health screenings and vaccinations with the " patient.   - Due for annual exam and lab work next month - discussed with patient, will ask PCP team to assist in scheduling   - Mammogram scheduled 2/8/2023   - Encouraged Shingrix and Td vaccines at the pharmacy     2. Sacroiliitis  Stable with occasional flares, managed with massage and Advil PRN. Continue current treatment plan as previously prescribed with your PCP    3. Lumbar spondylosis  Stable, using inversion table and massage therapy as needed for lumabr back pain. Continue current treatment plan as previously prescribed with your PCP    4. Age-related osteoporosis without current pathological fracture  DEXA scan 2/2022 revealed osteoporosis but no significant change as compared to previous. Patient is stable on vitamin D supplementation, not currently taking calcium supplement - advised she take 600 mg calcium twice daily in addition to vitamin D. Continue walking regimen and remain physically active. Follow up with PCP for continued evaluation and management.     5. Hypothyroidism, unspecified type  Stable on levothyroxine. Continue current treatment plan as previously prescribed with your PCP    Lab Results   Component Value Date    TSH 3.302 02/01/2022    FREET4 1.15 02/01/2022     6. Vitamin D deficiency disease  Stable on vitamin D supplementation. Continue current treatment plan as previously prescribed with your PCP    7. Class 1 obesity due to excess calories without serious comorbidity with body mass index (BMI) of 32.0 to 32.9 in adult  Recently started daily walking program and has worked up to 30 minutes per day. She is also working on decreasing caloric intake and carbohydrates. Encouraged continued efforts. Reinforced focusing on a diet that is low-fat/low-calorie with emphasis on fresh vegetables, fruits, and lean meats, as well as a physical activity goal of at least 150 minutes of moderate intensity activity per week.     8. Fever blister  Stable, uses Valtrex PRN. Continue current  treatment plan as previously prescribed with your PCP    9. Seasonal allergic rhinitis, unspecified trigger  Reports allergic rhinitis and flares during spring months, uses Allegra and Flonase PRN during that time. No current issues. Continue current treatment plan as previously prescribed with your PCP.     10. Family history of colon cancer  Noted. Last colonoscopy 11/2019 with removal of tubular adenoma. Recommended repeat in 5 years. Discussed this with patient today.     11. History of colon polyps  Stable. Last colonoscopy 11/2019 with removal of tubular adenoma. Recommended repeat in 5 years. Discussed this with patient today.     12. Sleep disorder  Chronic difficulty with sleep onset. She has tried OTC sleep aids, including Remfresh, but notes this makes her feel groggy. States she is retired and able to sleep in so she feels she is getting enough sleep at the current time. Advised patient to follow up with PCP if needed for further evaluation and management.     13. Decreased vision  Patient reports noticing decreased vision despite wearing her glasses and has not had an eye exam in >1 year. Referral placed to optometry   - Ambulatory referral/consult to Optometry; Future    14. Encounter for screening mammogram for malignant neoplasm of breast  Mammogram due 2/7/2023.   - Mammo Digital Screening Bilat; Future      Opioid screening: Patient does not use opioids     I offered to discuss advanced care planning, including how to pick a person who would make decisions for you if you were unable to make them for yourself, called a health care power of , and what kind of decisions you might make such as use of life sustaining treatments such as ventilators and tube feeding when faced with a life limiting illness recorded on a living will that they will need to know. (How you want to be cared for as you near the end of your natural life)     X Patient is interested in learning more about how to make  advanced directives.  I provided them paperwork and offered to discuss this with them.    Provided Rajni with a 5-10 year written screening schedule and personal prevention plan. Recommendations were developed using the USPSTF age appropriate recommendations. Education, counseling, and referrals were provided as needed.  After Visit Summary printed and given to patient which includes a list of additional screenings\tests needed.    Follow up in about 1 year (around 1/20/2024).      Katy Gilliam NP

## 2023-01-20 NOTE — PATIENT INSTRUCTIONS
Counseling and Referral of Other Preventative  (Italic type indicates deductible and co-insurance are waived)    Patient Name: Rajni Melchor  Today's Date: 1/20/2023    Health Maintenance       Date Due Completion Date    Shingles Vaccine (2 of 3) 08/13/2012 6/18/2012    COVID-19 Vaccine (4 - Booster for Pfizer series) 12/03/2021 10/8/2021    TETANUS VACCINE 06/18/2022 6/18/2012    Mammogram 02/07/2023 2/7/2022    Override on 8/15/2011: Done    DEXA Scan 02/07/2024 2/7/2022    Colorectal Cancer Screening 11/12/2024 11/12/2019    Lipid Panel 02/01/2027 2/1/2022        Orders Placed This Encounter   Procedures    Mammo Digital Screening Bilat    Ambulatory referral/consult to Optometry       The following information is provided to all patients.  This information is to help you find resources for any of the problems found today that may be affecting your health:                Living healthy guide: www.[facility]      Understanding Diabetes: www.diabetes.org      Eating healthy: www.cdc.gov/healthyweight      [facility] home safety checklist: www.cdc.gov/steadi/patient.html      Agency on Aging: www.goea.[place]      Alcoholics anonymous (AA): www.aa.org      Physical Activity: www.joaquín.nih.gov/jq6tbux      Tobacco use: www.quitwithusla.org     Counseling and Referral of Other Preventative  (Italic type indicates deductible and co-insurance are waived)    Patient Name: Rajni Melchor  Today's Date: 1/20/2023    Health Maintenance       Date Due Completion Date    Shingles Vaccine (2 of 3) 08/13/2012 6/18/2012    TETANUS VACCINE 06/18/2022 6/18/2012    Mammogram 02/07/2023 2/7/2022    Override on 8/15/2011: Done    DEXA Scan 02/07/2024 2/7/2022    Colorectal Cancer Screening 11/12/2024 11/12/2019    Lipid Panel 02/01/2027 2/1/2022        Orders Placed This Encounter   Procedures    Mammo Digital Screening Bilat    Ambulatory referral/consult to Optometry     The following information is provided to all  patients.  This information is to help you find resources for any of the problems found today that may be affecting your health:                Living healthy guide: www.[facility].[place].[facility]      Understanding Diabetes: www.diabetes.org      Eating healthy: www.cdc.gov/healthyweight      CDC home safety checklist: www.cdc.gov/steadi/patient.html      Agency on Aging: www.goea.[place]      Alcoholics anonymous (AA): www.aa.org      Physical Activity: www.[place].nih.gov/pf0vxzw      Tobacco use: www.quitwithusla.org

## 2023-01-27 ENCOUNTER — OFFICE VISIT (OUTPATIENT)
Dept: OPHTHALMOLOGY | Facility: CLINIC | Age: 73
End: 2023-01-27
Payer: MEDICARE

## 2023-01-27 ENCOUNTER — PATIENT MESSAGE (OUTPATIENT)
Dept: OPHTHALMOLOGY | Facility: CLINIC | Age: 73
End: 2023-01-27

## 2023-01-27 DIAGNOSIS — H54.7 DECREASED VISION: ICD-10-CM

## 2023-01-27 DIAGNOSIS — H25.13 NUCLEAR SCLEROSIS OF BOTH EYES: Primary | ICD-10-CM

## 2023-01-27 DIAGNOSIS — H43.822 VITREOMACULAR TRACTION SYNDROME OF LEFT EYE: ICD-10-CM

## 2023-01-27 DIAGNOSIS — H52.4 HYPEROPIA WITH ASTIGMATISM AND PRESBYOPIA, BILATERAL: ICD-10-CM

## 2023-01-27 DIAGNOSIS — H52.03 HYPEROPIA WITH ASTIGMATISM AND PRESBYOPIA, BILATERAL: ICD-10-CM

## 2023-01-27 DIAGNOSIS — H52.203 HYPEROPIA WITH ASTIGMATISM AND PRESBYOPIA, BILATERAL: ICD-10-CM

## 2023-01-27 PROCEDURE — 92134 OCT, RETINA - OU - BOTH EYES: ICD-10-PCS | Mod: 26,S$PBB,, | Performed by: OPTOMETRIST

## 2023-01-27 PROCEDURE — 99213 OFFICE O/P EST LOW 20 MIN: CPT | Mod: PBBFAC,PO | Performed by: OPTOMETRIST

## 2023-01-27 PROCEDURE — 99999 PR PBB SHADOW E&M-EST. PATIENT-LVL III: CPT | Mod: PBBFAC,,, | Performed by: OPTOMETRIST

## 2023-01-27 PROCEDURE — 92015 PR REFRACTION: ICD-10-PCS | Mod: ,,, | Performed by: OPTOMETRIST

## 2023-01-27 PROCEDURE — 99999 PR PBB SHADOW E&M-EST. PATIENT-LVL III: ICD-10-PCS | Mod: PBBFAC,,, | Performed by: OPTOMETRIST

## 2023-01-27 PROCEDURE — 92015 DETERMINE REFRACTIVE STATE: CPT | Mod: ,,, | Performed by: OPTOMETRIST

## 2023-01-27 PROCEDURE — 92134 CPTRZ OPH DX IMG PST SGM RTA: CPT | Mod: PBBFAC,PO | Performed by: OPTOMETRIST

## 2023-01-27 PROCEDURE — 92004 PR EYE EXAM, NEW PATIENT,COMPREHESV: ICD-10-PCS | Mod: S$PBB,,, | Performed by: OPTOMETRIST

## 2023-01-27 PROCEDURE — 92004 COMPRE OPH EXAM NEW PT 1/>: CPT | Mod: S$PBB,,, | Performed by: OPTOMETRIST

## 2023-01-27 NOTE — PROGRESS NOTES
HPI     Annual Exam            Comments: New patient to [facility]   Patient here for routine eye exam           Comments    Vision changes since last eye exam?: Yes, distance and near       Any eye pain today: None    Other ocular symptoms: Itchiness OU     Interested in contact lens fitting today? Yes             Last edited by Scarlet Singh MA on 1/27/2023  9:32 AM.            Assessment /Plan     For exam results, see Encounter Report.    Nuclear sclerosis of both eyes  Cataracts are not visually significant and not affecting activities of daily living. Annual observation is recommended at this time. Patient to call or RTC with any significant change in vision prior to next visit.     Vitreomacular traction left eye   Observe at this time.     Hyperopia with astigmatism and presbyopia, bilateral  Eyeglass Final Rx       Eyeglass Final Rx         Sphere Cylinder Axis Add    Right +1.25 +0.50 094 +2.50    Left +1.25 +0.75 015 +2.50      Type: PAL    Expiration Date: 1/27/2024                     RTC 1 yr for dilated eye exam or sooner if any changes to vision.   Discussed above and answered questions.

## 2023-02-08 ENCOUNTER — HOSPITAL ENCOUNTER (OUTPATIENT)
Dept: RADIOLOGY | Facility: HOSPITAL | Age: 73
Discharge: HOME | End: 2023-02-08
Attending: NURSE PRACTITIONER
Payer: MEDICARE

## 2023-02-08 DIAGNOSIS — Z12.31 ENCOUNTER FOR SCREENING MAMMOGRAM FOR MALIGNANT NEOPLASM OF BREAST: ICD-10-CM

## 2023-02-08 PROCEDURE — 77067 MAMMO DIGITAL SCREENING BILAT WITH TOMO: ICD-10-PCS | Mod: 26,,, | Performed by: RADIOLOGY

## 2023-02-08 PROCEDURE — 77067 SCR MAMMO BI INCL CAD: CPT | Mod: TC,PO

## 2023-02-08 PROCEDURE — 77063 BREAST TOMOSYNTHESIS BI: CPT | Mod: 26,,, | Performed by: RADIOLOGY

## 2023-02-08 PROCEDURE — 77067 SCR MAMMO BI INCL CAD: CPT | Mod: 26,,, | Performed by: RADIOLOGY

## 2023-02-08 PROCEDURE — 77063 MAMMO DIGITAL SCREENING BILAT WITH TOMO: ICD-10-PCS | Mod: 26,,, | Performed by: RADIOLOGY

## 2023-02-09 NOTE — PROGRESS NOTES
Your mammogram is normal. You will need to repeat the exam again in 1-2 years. If you have further questions please let me know. Kylah Benedict MD

## 2023-03-14 DIAGNOSIS — E03.9 HYPOTHYROIDISM, UNSPECIFIED TYPE: ICD-10-CM

## 2023-03-14 DIAGNOSIS — G47.9 SLEEP DISORDER: ICD-10-CM

## 2023-03-14 DIAGNOSIS — E78.5 HYPERLIPIDEMIA, UNSPECIFIED HYPERLIPIDEMIA TYPE: Primary | ICD-10-CM

## 2023-03-14 DIAGNOSIS — M81.0 AGE-RELATED OSTEOPOROSIS WITHOUT CURRENT PATHOLOGICAL FRACTURE: ICD-10-CM

## 2023-03-14 DIAGNOSIS — R73.09 ABNORMAL GLUCOSE: ICD-10-CM

## 2023-03-14 RX ORDER — LEVOTHYROXINE SODIUM 50 UG/1
TABLET ORAL
Qty: 90 TABLET | Refills: 3 | Status: SHIPPED | OUTPATIENT
Start: 2023-03-14 | End: 2023-08-17 | Stop reason: SDUPTHER

## 2023-03-14 NOTE — TELEPHONE ENCOUNTER
Refill Routing Note   Medication(s) are not appropriate for processing by [facility] for the following reason(s):       Required labs outdated    [facility] action(s):  Defer         Appointments  past 12m or future 3m with PCP    Date Provider   Last Visit   2/8/2022 Kylah Benedict MD   Next Visit   4/19/2023 Kylah Benedict MD   ED visits in past 90 days: 0        Note composed:9:40 AM 03/14/2023

## 2023-03-14 NOTE — TELEPHONE ENCOUNTER
Care Due:                  Date            Visit Type   Department     Provider  --------------------------------------------------------------------------------                                EP -                              PRIMARY      [facility] PRIMARY  Last Visit: 02-      CARE ([facility])   CARE           Kylah Benedict                              EP -                              PRIMARY      [facility] PRIMARY  Next Visit: 04-      CARE ([facility])   [facility]           Kylah Benedict                                                            Last  Test          Frequency    Reason                     Performed    Due Date  --------------------------------------------------------------------------------    TSH.........  12 months..  levothyroxine............  02- 01-    [employer] Embedded Care Gaps. Reference number: [id number]. 3/14/2023   2:11:31 AM CDT

## 2023-03-14 NOTE — TELEPHONE ENCOUNTER
Rajni Melchor,     I have sent the following medications to your preferred pharmacy on file. Please let me know if you have further questions.     Medications Ordered This Encounter   Medications    SYNTHROID 50 mcg tablet     Sig: TAKE 1 TABLET BEFORE BREAKFAST     Dispense:  90 tablet     Refill:  3        [facility] - [place], [place] - [address] [place], [place] [address] [facility] [address]  Phone: [phone] Fax: [phone]       Sincerely,   Kylah Benedict MD    I have signed for the following orders AND/OR meds.  Please call the patient and ask the patient to schedule the testing AND/OR inform about any medications that were sent.      Orders Placed This Encounter   Procedures    TSH     Standing Status:   Future     Standing Expiration Date:   5/12/2024    T4, Free     Standing Status:   Future     Standing Expiration Date:   5/12/2024    Lipid Panel     Standing Status:   Future     Standing Expiration Date:   5/12/2024    Hemoglobin A1C     Standing Status:   Future     Standing Expiration Date:   5/12/2024    Comprehensive Metabolic Panel     Standing Status:   Future     Standing Expiration Date:   5/12/2024    CBC Auto Differential     Standing Status:   Future     Standing Expiration Date:   5/12/2024       Medications Ordered This Encounter   Medications    SYNTHROID 50 mcg tablet     Sig: TAKE 1 TABLET BEFORE BREAKFAST     Dispense:  90 tablet     Refill:  3

## 2023-04-18 ENCOUNTER — PATIENT MESSAGE (OUTPATIENT)
Dept: PRIMARY CARE CLINIC | Facility: CLINIC | Age: 73
End: 2023-04-18
Payer: MEDICARE

## 2023-08-11 ENCOUNTER — LAB VISIT (OUTPATIENT)
Dept: LAB | Facility: HOSPITAL | Age: 73
End: 2023-08-11
Attending: FAMILY MEDICINE
Payer: MEDICARE

## 2023-08-11 DIAGNOSIS — G47.9 SLEEP DISORDER: ICD-10-CM

## 2023-08-11 DIAGNOSIS — R73.09 ABNORMAL GLUCOSE: ICD-10-CM

## 2023-08-11 DIAGNOSIS — M81.0 AGE-RELATED OSTEOPOROSIS WITHOUT CURRENT PATHOLOGICAL FRACTURE: ICD-10-CM

## 2023-08-11 DIAGNOSIS — E78.5 HYPERLIPIDEMIA, UNSPECIFIED HYPERLIPIDEMIA TYPE: ICD-10-CM

## 2023-08-11 DIAGNOSIS — E03.9 HYPOTHYROIDISM, UNSPECIFIED TYPE: ICD-10-CM

## 2023-08-11 LAB
ALBUMIN SERPL BCP-MCNC: 3.7 G/DL (ref 3.5–5.2)
ALP SERPL-CCNC: 64 U/L (ref 55–135)
ALT SERPL W/O P-5'-P-CCNC: 17 U/L (ref 10–44)
ANION GAP SERPL CALC-SCNC: 10 MMOL/L (ref 8–16)
AST SERPL-CCNC: 25 U/L (ref 10–40)
BASOPHILS # BLD AUTO: 0.03 K/UL (ref 0–0.2)
BASOPHILS NFR BLD: 0.9 % (ref 0–1.9)
BILIRUB SERPL-MCNC: 1.1 MG/DL (ref 0.1–1)
BUN SERPL-MCNC: 12 MG/DL (ref 8–23)
CALCIUM SERPL-MCNC: 9.6 MG/DL (ref 8.7–10.5)
CHLORIDE SERPL-SCNC: 107 MMOL/L (ref 95–110)
CHOLEST SERPL-MCNC: 175 MG/DL (ref 120–199)
CHOLEST/HDLC SERPL: 2.2 {RATIO} (ref 2–5)
CO2 SERPL-SCNC: 25 MMOL/L (ref 23–29)
CREAT SERPL-MCNC: 1 MG/DL (ref 0.5–1.4)
DIFFERENTIAL METHOD: ABNORMAL
EOSINOPHIL # BLD AUTO: 0.2 K/UL (ref 0–0.5)
EOSINOPHIL NFR BLD: 5.9 % (ref 0–8)
ERYTHROCYTE [DISTWIDTH] IN BLOOD BY AUTOMATED COUNT: 12.4 % (ref 11.5–14.5)
EST. GFR  (NO RACE VARIABLE): 59.9 ML/MIN/1.73 M^2
ESTIMATED AVG GLUCOSE: 97 MG/DL (ref 68–131)
GLUCOSE SERPL-MCNC: 92 MG/DL (ref 70–110)
HBA1C MFR BLD: 5 % (ref 4–5.6)
HCT VFR BLD AUTO: 42.3 % (ref 37–48.5)
HDLC SERPL-MCNC: 78 MG/DL (ref 40–75)
HDLC SERPL: 44.6 % (ref 20–50)
HGB BLD-MCNC: 14.2 G/DL (ref 12–16)
IMM GRANULOCYTES # BLD AUTO: 0.01 K/UL (ref 0–0.04)
IMM GRANULOCYTES NFR BLD AUTO: 0.3 % (ref 0–0.5)
LDLC SERPL CALC-MCNC: 84.4 MG/DL (ref 63–159)
LYMPHOCYTES # BLD AUTO: 0.9 K/UL (ref 1–4.8)
LYMPHOCYTES NFR BLD: 27.4 % (ref 18–48)
MCH RBC QN AUTO: 31.9 PG (ref 27–31)
MCHC RBC AUTO-ENTMCNC: 33.6 G/DL (ref 32–36)
MCV RBC AUTO: 95 FL (ref 82–98)
MONOCYTES # BLD AUTO: 0.3 K/UL (ref 0.3–1)
MONOCYTES NFR BLD: 9.4 % (ref 4–15)
NEUTROPHILS # BLD AUTO: 1.9 K/UL (ref 1.8–7.7)
NEUTROPHILS NFR BLD: 56.1 % (ref 38–73)
NONHDLC SERPL-MCNC: 97 MG/DL
NRBC BLD-RTO: 0 /100 WBC
PLATELET # BLD AUTO: 160 K/UL (ref 150–450)
PMV BLD AUTO: 10.7 FL (ref 9.2–12.9)
POTASSIUM SERPL-SCNC: 5 MMOL/L (ref 3.5–5.1)
PROT SERPL-MCNC: 6.6 G/DL (ref 6–8.4)
RBC # BLD AUTO: 4.45 M/UL (ref 4–5.4)
SODIUM SERPL-SCNC: 142 MMOL/L (ref 136–145)
T4 FREE SERPL-MCNC: 0.97 NG/DL (ref 0.71–1.51)
TRIGL SERPL-MCNC: 63 MG/DL (ref 30–150)
TSH SERPL DL<=0.005 MIU/L-ACNC: 3.38 UIU/ML (ref 0.4–4)
WBC # BLD AUTO: 3.39 K/UL (ref 3.9–12.7)

## 2023-08-11 PROCEDURE — 83036 HEMOGLOBIN GLYCOSYLATED A1C: CPT | Performed by: FAMILY MEDICINE

## 2023-08-11 PROCEDURE — 85025 COMPLETE CBC W/AUTO DIFF WBC: CPT | Performed by: FAMILY MEDICINE

## 2023-08-11 PROCEDURE — 84439 ASSAY OF FREE THYROXINE: CPT | Performed by: FAMILY MEDICINE

## 2023-08-11 PROCEDURE — 80061 LIPID PANEL: CPT | Performed by: FAMILY MEDICINE

## 2023-08-11 PROCEDURE — 84443 ASSAY THYROID STIM HORMONE: CPT | Performed by: FAMILY MEDICINE

## 2023-08-11 PROCEDURE — 80053 COMPREHEN METABOLIC PANEL: CPT | Performed by: FAMILY MEDICINE

## 2023-08-11 PROCEDURE — 36415 COLL VENOUS BLD VENIPUNCTURE: CPT | Mod: PN | Performed by: FAMILY MEDICINE

## 2023-08-17 ENCOUNTER — OFFICE VISIT (OUTPATIENT)
Dept: PRIMARY CARE CLINIC | Facility: CLINIC | Age: 73
End: 2023-08-17
Payer: MEDICARE

## 2023-08-17 VITALS
WEIGHT: 162.81 LBS | HEIGHT: 59 IN | BODY MASS INDEX: 32.82 KG/M2 | HEART RATE: 72 BPM | DIASTOLIC BLOOD PRESSURE: 76 MMHG | SYSTOLIC BLOOD PRESSURE: 120 MMHG

## 2023-08-17 DIAGNOSIS — R53.83 FATIGUE, UNSPECIFIED TYPE: ICD-10-CM

## 2023-08-17 DIAGNOSIS — Z23 NEED FOR SHINGLES VACCINE: ICD-10-CM

## 2023-08-17 DIAGNOSIS — E78.5 HYPERLIPIDEMIA, UNSPECIFIED HYPERLIPIDEMIA TYPE: ICD-10-CM

## 2023-08-17 DIAGNOSIS — E66.09 CLASS 1 OBESITY DUE TO EXCESS CALORIES WITHOUT SERIOUS COMORBIDITY WITH BODY MASS INDEX (BMI) OF 32.0 TO 32.9 IN ADULT: ICD-10-CM

## 2023-08-17 DIAGNOSIS — M46.1 SACROILIITIS: ICD-10-CM

## 2023-08-17 DIAGNOSIS — E55.9 VITAMIN D DEFICIENCY DISEASE: ICD-10-CM

## 2023-08-17 DIAGNOSIS — G47.9 SLEEP DISORDER: ICD-10-CM

## 2023-08-17 DIAGNOSIS — L29.9 ITCHING: ICD-10-CM

## 2023-08-17 DIAGNOSIS — L30.9 DERMATITIS: ICD-10-CM

## 2023-08-17 DIAGNOSIS — M81.0 AGE-RELATED OSTEOPOROSIS WITHOUT CURRENT PATHOLOGICAL FRACTURE: ICD-10-CM

## 2023-08-17 DIAGNOSIS — L29.9 SCALP PRURITUS: ICD-10-CM

## 2023-08-17 DIAGNOSIS — R19.5 CHANGE IN STOOL CALIBER: ICD-10-CM

## 2023-08-17 DIAGNOSIS — E03.9 HYPOTHYROIDISM, UNSPECIFIED TYPE: Primary | ICD-10-CM

## 2023-08-17 DIAGNOSIS — R19.7 DIARRHEA, UNSPECIFIED TYPE: ICD-10-CM

## 2023-08-17 DIAGNOSIS — Z23 NEED FOR TDAP VACCINATION: ICD-10-CM

## 2023-08-17 PROCEDURE — 99999 PR PBB SHADOW E&M-EST. PATIENT-LVL IV: ICD-10-PCS | Mod: PBBFAC,,, | Performed by: FAMILY MEDICINE

## 2023-08-17 PROCEDURE — 99999 PR PBB SHADOW E&M-EST. PATIENT-LVL IV: CPT | Mod: PBBFAC,,, | Performed by: FAMILY MEDICINE

## 2023-08-17 PROCEDURE — 99214 OFFICE O/P EST MOD 30 MIN: CPT | Mod: PBBFAC,PN | Performed by: FAMILY MEDICINE

## 2023-08-17 PROCEDURE — 99215 PR OFFICE/OUTPT VISIT, EST, LEVL V, 40-54 MIN: ICD-10-PCS | Mod: S$PBB,,, | Performed by: FAMILY MEDICINE

## 2023-08-17 PROCEDURE — 99215 OFFICE O/P EST HI 40 MIN: CPT | Mod: S$PBB,,, | Performed by: FAMILY MEDICINE

## 2023-08-17 RX ORDER — LEVOTHYROXINE SODIUM 50 UG/1
TABLET ORAL
Qty: 90 TABLET | Refills: 3 | Status: SHIPPED | OUTPATIENT
Start: 2023-08-17 | End: 2023-09-14 | Stop reason: SDUPTHER

## 2023-08-17 RX ORDER — ZOSTER VACCINE RECOMBINANT, ADJUVANTED 50 MCG/0.5
0.5 KIT INTRAMUSCULAR ONCE
Qty: 1 EACH | Refills: 1 | Status: SHIPPED | OUTPATIENT
Start: 2023-09-15 | End: 2023-09-15

## 2023-08-17 RX ORDER — TRIAMCINOLONE ACETONIDE 1 MG/G
CREAM TOPICAL 4 TIMES DAILY
Qty: 456 G | Refills: 1 | Status: SHIPPED | OUTPATIENT
Start: 2023-08-17 | End: 2023-08-23 | Stop reason: SDUPTHER

## 2023-08-17 RX ORDER — LEVOTHYROXINE SODIUM 50 UG/1
50 TABLET ORAL
Qty: 30 TABLET | Refills: 1 | Status: SHIPPED | OUTPATIENT
Start: 2023-08-17 | End: 2023-09-14 | Stop reason: SDUPTHER

## 2023-08-17 RX ORDER — TETANUS TOXOID, REDUCED DIPHTHERIA TOXOID AND ACELLULAR PERTUSSIS VACCINE, ADSORBED 5; 2.5; 8; 8; 2.5 [IU]/.5ML; [IU]/.5ML; UG/.5ML; UG/.5ML; UG/.5ML
0.5 SUSPENSION INTRAMUSCULAR ONCE
Qty: 0.5 ML | Refills: 0 | Status: SHIPPED | OUTPATIENT
Start: 2023-08-17 | End: 2023-08-17

## 2023-08-17 RX ORDER — FLUOCINOLONE ACETONIDE 0.1 MG/ML
SOLUTION TOPICAL
Qty: 60 ML | Refills: 1 | Status: SHIPPED | OUTPATIENT
Start: 2023-08-17

## 2023-08-17 RX ORDER — MINERAL OIL
180 ENEMA (ML) RECTAL DAILY
COMMUNITY

## 2023-08-17 RX ORDER — ACYCLOVIR 400 MG/1
TABLET ORAL
Qty: 180 TABLET | Refills: 3 | Status: SHIPPED | OUTPATIENT
Start: 2023-08-17

## 2023-08-17 NOTE — PROGRESS NOTES
Subjective:      Patient ID: Rajni Melchor is a 73 y.o. female.    Chief Complaint: Annual Exam and Rash      Patient is a 73 y.o. female coming in today for annual exam.   She recently f/u with Dr. Foster, ophthalmology, for cataracts treatment. Reports increase in fatigue for the past 2 weeks. Pt thinks sx are related to her forgetting to take multivitamin supplement regularly. Associated sx of rash and diarrhea as soon as she finishes drinking her coffee in the morning. Currently on Allegra for rash treatment. Last colonoscopy was in 2019 with only findings of one single polyps. Currently on magnesium supplement. Reports increase in dairy in regular diet. Recent lab work results reviewed with pt. All markers are within normal limits. She is due for tetanus and shingles vaccines. No other health concern at this time.       1. Hypothyroidism, unspecified type    2. Hyperlipidemia, unspecified hyperlipidemia type    3. Vitamin D deficiency disease    4. Age-related osteoporosis without current pathological fracture    5. Sleep disorder    6. Sacroiliitis    7. Class 1 obesity due to excess calories without serious comorbidity with body mass index (BMI) of 32.0 to 32.9 in adult    8. Dermatitis    9. Itching    10. Fatigue, unspecified type    11. Change in stool caliber    12. Diarrhea, unspecified type    13. Need for Tdap vaccination    14. Need for shingles vaccine    15. Scalp pruritus       [facility]    8/15/2023 11:30 PM CDT - Filed by Patient   On average, how many days per week do you engage in moderate to strenuous exercise (like a brisk walk)? 0 days   On average, how many minutes do you engage in exercise at this level?    Do you feel stress - tense, restless, nervous, or anxious, or unable to sleep at night because your mind is troubled all the time - these days? Very much   Do you belong to any clubs or organizations such as Sikhism groups, unions, fraternal or athletic groups, or school groups?  Yes   How often do you attend meetings of the clubs or organizations you belong to? More than 4 times per year   In a typical week, how many times do you talk on the phone with family, friends, or neighbors? More than three times a week   How often do you get together with friends or relatives? More than three times a week   Are you , , , , never , or living with a partner?    How hard is it for you to pay for the very basics like food, housing, medical care, and heating? Not hard at all   Within the past 12 months, you worried that your food would run out before you got the money to buy more. Never true   Within the past 12 months, the food you bought just didnt last and you didnt have money to get more. Never true   In the past 12 months, has lack of transportation kept you from medical appointments or from getting medications? No   In the past 12 months, has lack of transportation kept you from meetings, work, or from getting things needed for daily living? No   How often do you have a drink containing alcohol? 4 or more times a week   How many drinks containing alcohol do you have on a typical day when you are drinking? 1 or 2   How often do you have six or more drinks on one occasion? Never   In the last 12 months, was there a time when you were not able to pay the mortgage or rent on time? No   In the last 12 months, how many places have you lived? (range: at least 0) 1   In the last 12 months, was there a time when you did not have a steady place to sleep or slept in a shelter (including now)? No         Pmh, Psh, Family Hx, Social Hx, HM updated in Epic Tabs today.   Review of Systems   Constitutional:  Positive for fatigue. Negative for chills and fever.   HENT:  Negative for ear pain and trouble swallowing.    Eyes:  Negative for pain and visual disturbance.   Respiratory:  Negative for cough and shortness of breath.    Cardiovascular:  Negative for chest pain  "and leg swelling.   Gastrointestinal:  Positive for diarrhea. Negative for abdominal pain, blood in stool, nausea and vomiting.   Endocrine: Negative for cold intolerance and heat intolerance.   Genitourinary:  Negative for dysuria and frequency.   Musculoskeletal:  Negative for joint swelling, myalgias and neck pain.   Skin:  Positive for rash. Negative for color change.   Neurological:  Negative for dizziness and headaches.   Psychiatric/Behavioral:  Negative for behavioral problems and sleep disturbance.      Objective:     Vitals:    08/17/23 1040   BP: 120/76   Pulse: 72   Weight: 73.9 kg (162 lb 13 oz)   Height: 4' 11" (1.499 m)     Wt Readings from Last 10 Encounters:   08/17/23 73.9 kg (162 lb 13 oz)   01/20/23 73.4 kg (161 lb 13.1 oz)   02/08/22 76.1 kg (167 lb 10.6 oz)   12/17/21 75.9 kg (167 lb 5.3 oz)   01/12/21 76.8 kg (169 lb 5 oz)   01/04/21 76.8 kg (169 lb 3.3 oz)   11/09/20 74.3 kg (163 lb 12.8 oz)   01/28/20 74.8 kg (164 lb 14.5 oz)   11/12/19 73.3 kg (161 lb 9.6 oz)   11/07/19 73.9 kg (162 lb 14.7 oz)     Physical Exam  Vitals reviewed.   Constitutional:       Appearance: Normal appearance. She is well-developed. She is obese.   HENT:      Head: Normocephalic and atraumatic.      Right Ear: Tympanic membrane and external ear normal.      Left Ear: Tympanic membrane and external ear normal.      Nose: Nose normal.      Mouth/Throat:      Mouth: Mucous membranes are moist.      Pharynx: Oropharynx is clear.   Eyes:      Conjunctiva/sclera: Conjunctivae normal.      Pupils: Pupils are equal, round, and reactive to light.   Neck:      Thyroid: No thyromegaly.   Cardiovascular:      Rate and Rhythm: Normal rate and regular rhythm.      Heart sounds: Normal heart sounds. No murmur heard.     No friction rub. No gallop.   Pulmonary:      Effort: Pulmonary effort is normal. No respiratory distress.      Breath sounds: Normal breath sounds. No wheezing or rales.   Abdominal:      General: Bowel sounds are " normal. There is no distension.      Palpations: Abdomen is soft.      Tenderness: There is no abdominal tenderness. There is no rebound.   Musculoskeletal:         General: Normal range of motion.      Cervical back: Normal range of motion and neck supple.   Lymphadenopathy:      Cervical: No cervical adenopathy.   Skin:     General: Skin is warm and dry.      Findings: No rash.   Neurological:      Mental Status: She is alert and oriented to person, place, and time.   Psychiatric:         Attention and Perception: Attention and perception normal.         Mood and Affect: Mood and affect normal.         Speech: Speech normal.         Behavior: Behavior normal.         Thought Content: Thought content normal.         Cognition and Memory: Cognition and memory normal.         Judgment: Judgment normal.         Assessment:     1. Hypothyroidism, unspecified type    2. Hyperlipidemia, unspecified hyperlipidemia type    3. Vitamin D deficiency disease    4. Age-related osteoporosis without current pathological fracture    5. Sleep disorder    6. Sacroiliitis    7. Class 1 obesity due to excess calories without serious comorbidity with body mass index (BMI) of 32.0 to 32.9 in adult    8. Dermatitis    9. Itching    10. Fatigue, unspecified type    11. Change in stool caliber    12. Diarrhea, unspecified type    13. Need for Tdap vaccination    14. Need for shingles vaccine    15. Scalp pruritus        Plan:   Rajni was seen today for annual exam and rash.    Diagnoses and all orders for this visit:    Hypothyroidism, unspecified type  -     SYNTHROID 50 mcg tablet; TAKE 1 TABLET BEFORE BREAKFAST  -     SYNTHROID 50 mcg tablet; Take 1 tablet (50 mcg total) by mouth before breakfast.    Hyperlipidemia, unspecified hyperlipidemia type    Vitamin D deficiency disease    Age-related osteoporosis without current pathological fracture    Sleep disorder    Sacroiliitis    Class 1 obesity due to excess calories without serious  comorbidity with body mass index (BMI) of 32.0 to 32.9 in adult    Dermatitis  -     triamcinolone acetonide 0.1% (KENALOG) 0.1 % cream; Apply topically 4 (four) times daily. AAA BID prn rash of abdomen/ legs, arms .    Itching  -     triamcinolone acetonide 0.1% (KENALOG) 0.1 % cream; Apply topically 4 (four) times daily. AAA BID prn rash of abdomen/ legs, arms .    Fatigue, unspecified type    Change in stool caliber  -     Ambulatory referral/consult to Gastroenterology; Future    Diarrhea, unspecified type  -     Ambulatory referral/consult to Gastroenterology; Future    Need for Tdap vaccination  -     diphth,pertus,acell,,tetanus (BOOSTRIX TDAP) 2.5-8-5 Lf-mcg-Lf/0.5mL Syrg injection; Inject 0.5 mLs into the muscle once. for 1 dose    Need for shingles vaccine  -     varicella-zoster gE-AS01B, PF, (SHINGRIX, PF,) 50 mcg/0.5 mL injection; Inject 0.5 mLs into the muscle once. for 1 dose    Scalp pruritus  -     fluocinolone (SYNALAR) 0.01 % external solution; AAA of scalp qd to bid prn scalp itching.    Other orders  -     acyclovir (ZOVIRAX) 400 MG tablet; TAKE ONE TABLET BY MOUTH BID to prevent fever blister, but can increase to 5 TIMES A DAY FOR 3-5 DAYS FOR FLARE UP FEVER BLISTERS.      Diarrhea, fatigue, and itching dermatitis are - New Problem, discussed symptoms, work up, suspected diagnosis.   Referral given to GI for further diarrhea treatment if sx persist after 1 week.   Instructed to stop magnesium supplement and dairy intake for diarrhea treatment.   New Rx Kenalog cream PRN for itching and dermatitis treatment.  New Rx Synalar lotion PRN for scalp itching treatment.    Advised on Aquaphor lotion for itching treatment.   Refilled Rx Acyclovir 400 mg BID for 3-5 days for fever blister treatment.  Pt to switch to Synthroid 50 mcg/day for thyroid disorder treatment.   Advised to get Shingrix and TDAP vaccine at local pharmacy.   Advised to get COVID booster at local pharmacy.   Instructed to f/u in 1  year.     Patient Instructions   Aquaphor lotion after cooler shower/ bath  to help reduce itching   [facility].     Stop dairy for now and magnesium to see if helps with diarrhea and bowel changes/itching.     Follow up in about 1 year (around 8/17/2024) for physical with Dr HARDING.      LABS:   Lab Results   Component Value Date    HGBA1C 5.0 08/11/2023    HGBA1C 5.2 01/05/2021    HGBA1C 5.5 01/23/2013      Lab Results   Component Value Date    CHOL 175 08/11/2023    CHOL 173 02/01/2022    CHOL 191 01/05/2021     Lab Results   Component Value Date    LDLCALC 84.4 08/11/2023    LDLCALC 82.8 02/01/2022    LDLCALC 94.8 01/05/2021     Lab Results   Component Value Date    WBC 3.39 (L) 08/11/2023    HGB 14.2 08/11/2023    HCT 42.3 08/11/2023     08/11/2023    CHOL 175 08/11/2023    TRIG 63 08/11/2023    HDL 78 (H) 08/11/2023    ALT 17 08/11/2023    AST 25 08/11/2023     08/11/2023    K 5.0 08/11/2023     08/11/2023    CREATININE 1.0 08/11/2023    BUN 12 08/11/2023    CO2 25 08/11/2023    TSH 3.382 08/11/2023    HGBA1C 5.0 08/11/2023       The 10-year ASCVD risk score (Tari MARADIAGA, et al., 2019) is: 9.8%    Values used to calculate the score:      Age: 73 years      Sex: Female      Is Non- : No      Diabetic: No      Tobacco smoker: No      Systolic Blood Pressure: 120 mmHg      Is BP treated: No      HDL Cholesterol: 78 mg/dL      Total Cholesterol: 175 mg/dL  Mammo Digital Screening Bilat w/ Edilberto  Narrative: Result:  Mammo Digital Screening Bilat w/ Edilberto    History:  Patient is 73 y.o. and is seen for a screening mammogram.    Films Compared:  Compared to: 02/07/2022 Mammo Digital Screening Bilat w/ Edilberto and   01/12/2021 Mammo Digital Screening Bilat w/ Edilberto     Findings:   This procedure was performed using tomosynthesis.   Computer-aided detection was utilized in the interpretation of this   examination.    The breasts are heterogeneously  dense, which may obscure small masses.   There is no evidence of suspicious masses, microcalcifications or   architectural distortion.  Impression:    No mammographic evidence of malignancy.    BI-RADS Category 1: Negative    Recommendation:  Routine screening mammogram in 1 year is recommended.    Your estimated lifetime risk of breast cancer (to age 85) based on   Tyrer-Cuzick risk assessment model is 3.49 %.  According to the American   Cancer Society, patients with a lifetime breast cancer risk of 20% or   higher might benefit from supplemental screening tests. ??   45 minutes of total time spent on the encounter, which includes face to face time and non-face to face time preparing to see the patient (eg, review of tests), Obtaining and/or reviewing separately obtained history, Documenting clinical information in the electronic or other health record, Independently interpreting results (not separately reported) and communicating results to the patient/family/caregiver, or Care coordination (not separately reported).    Scribe Attestation:   I, Trenton Merlos, am scribing for, and in the presence of, Dr. Kylah Benedict MD. I performed the above scribed service and the documentation accurately describes the services I performed. I attest to the accuracy of the note.    I, Dr. Kylah Benedict MD, reviewed documentation as scribed above. I personally performed the services described in this documentation.  I agree that the record reflects my personal performance and is accurate and complete. Kylah Benedict MD.    08/17/2023

## 2023-08-17 NOTE — PATIENT INSTRUCTIONS
Aquaphor lotion after cooler shower/ bath  to help reduce itching   [facility].     Stop dairy for now and magnesium to see if helps with diarrhea and bowel changes/itching.

## 2023-08-23 ENCOUNTER — TELEPHONE (OUTPATIENT)
Dept: PRIMARY CARE CLINIC | Facility: CLINIC | Age: 73
End: 2023-08-23
Payer: MEDICARE

## 2023-08-23 DIAGNOSIS — L30.9 DERMATITIS: ICD-10-CM

## 2023-08-23 DIAGNOSIS — L29.9 ITCHING: ICD-10-CM

## 2023-08-23 RX ORDER — TRIAMCINOLONE ACETONIDE 1 MG/G
CREAM TOPICAL 4 TIMES DAILY
Qty: 456 G | Refills: 1 | Status: SHIPPED | OUTPATIENT
Start: 2023-08-23

## 2023-08-23 NOTE — TELEPHONE ENCOUNTER
----- Message from Verena Billings sent at 8/23/2023  9:02 AM CDT -----  Contact: [facility]  Type:  Pharmacy Calling to Clarify an RX    Name of Caller:Nhi   Pharmacy Name:[facility]   Prescription Name:triamcinolone acetonide 0.1% (KENALOG) 0.1 % cream  What do they need to clarify?: Directions   Best Call Back Number: [phone]  Additional Information: (ref# [id number])

## 2023-08-23 NOTE — TELEPHONE ENCOUNTER
Rajni Melchor,     I have sent the following medications to your preferred pharmacy on file. Please let me know if you have further questions.     Medications Ordered This Encounter   Medications    triamcinolone acetonide 0.1% (KENALOG) 0.1 % cream     Sig: Apply topically 4 (four) times daily. abdomen/ legs, arms .     Dispense:  456 g     Refill:  1        [facility] - [address] [address]  Phone: [phone] Fax: [phone]       Sincerely,   Kylah Benedict MD

## 2023-09-14 ENCOUNTER — PATIENT MESSAGE (OUTPATIENT)
Dept: PRIMARY CARE CLINIC | Facility: CLINIC | Age: 73
End: 2023-09-14
Payer: MEDICARE

## 2023-09-14 DIAGNOSIS — E03.9 HYPOTHYROIDISM, UNSPECIFIED TYPE: ICD-10-CM

## 2023-09-14 RX ORDER — LEVOTHYROXINE SODIUM 50 UG/1
50 TABLET ORAL
Qty: 90 TABLET | Refills: 3 | Status: SHIPPED | OUTPATIENT
Start: 2023-09-14 | End: 2023-10-23 | Stop reason: SDUPTHER

## 2023-09-14 NOTE — TELEPHONE ENCOUNTER
Refill Decision Note   Rajni Melchor  is requesting a refill authorization.  Brief Assessment and Rationale for Refill:  Approve     Medication Therapy Plan:         Comments:     Note composed:1:46 PM 09/14/2023

## 2023-09-14 NOTE — TELEPHONE ENCOUNTER
No care due was identified.  [facility] Embedded Care Due Messages. Reference number: [id number].   9/14/2023 10:11:52 AM CDT

## 2023-09-28 ENCOUNTER — OFFICE VISIT (OUTPATIENT)
Dept: GASTROENTEROLOGY | Facility: CLINIC | Age: 73
End: 2023-09-28
Payer: MEDICARE

## 2023-09-28 ENCOUNTER — HOSPITAL ENCOUNTER (OUTPATIENT)
Dept: RADIOLOGY | Facility: HOSPITAL | Age: 73
Discharge: HOME | End: 2023-09-28
Attending: NURSE PRACTITIONER
Payer: MEDICARE

## 2023-09-28 ENCOUNTER — PATIENT MESSAGE (OUTPATIENT)
Dept: GASTROENTEROLOGY | Facility: CLINIC | Age: 73
End: 2023-09-28

## 2023-09-28 VITALS
HEIGHT: 59 IN | SYSTOLIC BLOOD PRESSURE: 132 MMHG | WEIGHT: 161.63 LBS | BODY MASS INDEX: 32.58 KG/M2 | DIASTOLIC BLOOD PRESSURE: 79 MMHG | HEART RATE: 63 BPM

## 2023-09-28 DIAGNOSIS — R19.5 CHANGE IN STOOL CALIBER: ICD-10-CM

## 2023-09-28 DIAGNOSIS — R19.7 DIARRHEA, UNSPECIFIED TYPE: ICD-10-CM

## 2023-09-28 PROCEDURE — 74019 RADEX ABDOMEN 2 VIEWS: CPT | Mod: 26,,, | Performed by: RADIOLOGY

## 2023-09-28 PROCEDURE — 74019 RADEX ABDOMEN 2 VIEWS: CPT | Mod: TC,PN

## 2023-09-28 PROCEDURE — 74019 XR ABDOMEN FLAT AND ERECT: ICD-10-PCS | Mod: 26,,, | Performed by: RADIOLOGY

## 2023-09-28 PROCEDURE — 99999 PR PBB SHADOW E&M-EST. PATIENT-LVL V: ICD-10-PCS | Mod: PBBFAC,,, | Performed by: NURSE PRACTITIONER

## 2023-09-28 PROCEDURE — 99214 PR OFFICE/OUTPT VISIT, EST, LEVL IV, 30-39 MIN: ICD-10-PCS | Mod: S$PBB,,, | Performed by: NURSE PRACTITIONER

## 2023-09-28 PROCEDURE — 99215 OFFICE O/P EST HI 40 MIN: CPT | Mod: PBBFAC,PN | Performed by: NURSE PRACTITIONER

## 2023-09-28 PROCEDURE — 99999 PR PBB SHADOW E&M-EST. PATIENT-LVL V: CPT | Mod: PBBFAC,,, | Performed by: NURSE PRACTITIONER

## 2023-09-28 PROCEDURE — 99214 OFFICE O/P EST MOD 30 MIN: CPT | Mod: S$PBB,,, | Performed by: NURSE PRACTITIONER

## 2023-09-28 NOTE — PROGRESS NOTES
Clinic Consult:  [facility] Consultation Note    Reason for Consult:  Diagnoses of Change in stool caliber and Diarrhea, unspecified type were pertinent to this visit.    [facility]: Kylah Benedict   [address] [address] [place] [address]    HPI:  This is a 73 y.o. female here for evaluation of the above  Pt states that over the last 6 months she has had intermittent issues with abnormal stool caliber.  She states that she has had multiple episodes of thin, loose stools in the morning.  Occasional urgency.   Denies any associated abdominal pain. No bloating.  No melena or hematochezia.   No new or change in medication  Has had improvement with a change in diet to increase protein intake.   Last colonoscopy 2019 with one polyp, repeat 5 years recommended.       Review of Systems   Constitutional:  Negative for chills, fever, malaise/fatigue and weight loss.   Respiratory:  Negative for cough.    Cardiovascular:  Negative for chest pain.   Gastrointestinal:         Per HPI   Musculoskeletal:  Negative for myalgias.   Skin:  Negative for itching and rash.   Neurological:  Negative for headaches.   Psychiatric/Behavioral:  The patient is not nervous/anxious.        Medical History:   Past Medical History:   Diagnosis Date    Allergic rhinitis     Allergic rhinitis     Arthritis     Basal cell carcinoma     forehead    Cancer     Skin Cancer-forehead     History of colon polyps 05/05/2016    Hyperlipidemia     Hypothyroidism     Osteopenia     Osteoporosis     S/P nasal septoplasty     Trouble in sleeping     Vitamin D deficiency disease        Surgical History:  Past Surgical History:   Procedure Laterality Date    APPENDECTOMY      bitubal ligation      COLONOSCOPY N/A 5/5/2016    Procedure: COLONOSCOPY;  Surgeon: Jorge Hampton MD;  Location: [facility];  Service: Endoscopy;  Laterality: N/A;    COLONOSCOPY N/A 11/12/2019    Procedure: COLONOSCOPY;  Surgeon: Latonya Adamson MD;  Location: [facility];   Service: Endoscopy;  Laterality: N/A;    metal armida in right arm      NASAL SEPTOPLASTY W/ TURBINOPLASTY      SHOULDER SURGERY      steel plate in right shoulder,     WRIST SURGERY         Family History:   Family History   Problem Relation Age of Onset    Diabetes Mother     Hypertension Mother     Cancer Father         liver and bladder    Diabetes Sister     Pacemaker/defibrilator Sister     Melanoma Maternal Uncle     Colon cancer Maternal Uncle     Breast cancer Paternal Cousin     Breast cancer Other     Breast cancer Other     Psoriasis Neg Hx     Lupus Neg Hx     Eczema Neg Hx        Social History:   Social History     Tobacco Use    Smoking status: Former     Current packs/day: 0.00     Average packs/day: 2.0 packs/day for 16.0 years (32.0 ttl pk-yrs)     Types: Cigarettes     Start date: 1972     Quit date: 1988     Years since quittin.4    Smokeless tobacco: Never   Substance Use Topics    Alcohol use: Yes     Alcohol/week: 14.0 standard drinks of alcohol     Types: 14 Glasses of wine per week     Comment: 2 glasses of wine per night    Drug use: No       Allergies: Reviewed    Home Medications:   Current Outpatient Medications on File Prior to Visit   Medication Sig Dispense Refill    acyclovir (ZOVIRAX) 400 MG tablet TAKE ONE TABLET BY MOUTH BID to prevent fever blister, but can increase to 5 TIMES A DAY FOR 3-5 DAYS FOR FLARE UP FEVER BLISTERS. 180 tablet 3    ascorbic acid, vitamin C, (VITAMIN C) 1000 MG tablet Take 1,000 mg by mouth once daily.      cholecalciferol, vitamin D3, (VITAMIN D3) 2,000 unit Cap Take 1 capsule (2,000 Units total) by mouth once daily. 100 capsule 3    clobetasoL (TEMOVATE) 0.05 % external solution       fexofenadine (ALLEGRA) 180 MG tablet Take 180 mg by mouth once daily.      fluocinolone (SYNALAR) 0.01 % external solution AAA of scalp qd to bid prn scalp itching. 60 mL 1    fluticasone propionate (FLONASE) 50 mcg/actuation nasal spray USE 2 SPRAYS IN EACH  "NOSTRIL ONCE DAILY 16 g 11    SYNTHROID 50 mcg tablet Take 1 tablet (50 mcg total) by mouth before breakfast. 90 tablet 3    triamcinolone acetonide 0.1% (KENALOG) 0.1 % cream Apply topically 4 (four) times daily. abdomen/ legs, arms . 456 g 1    ZINC ACETATE ORAL Take by mouth.      clobetasoL (TEMOVATE) 0.05 % external solution SMARTSIG:Sparingly Topical Daily      fexofenadine (ALLEGRA) 180 MG tablet Take 180 mg by mouth.        No current facility-administered medications on file prior to visit.       Physical Exam:  Vital Signs:  /79 (BP Location: Right arm, Patient Position: Sitting, BP Method: Medium (Automatic))   Pulse 63   Ht 4' 11" (1.499 m)   Wt 73.3 kg (161 lb 9.6 oz)   LMP  (LMP Unknown)   BMI 32.64 kg/m²   Body mass index is 32.64 kg/m².  Physical Exam  Vitals reviewed.   Constitutional:       Appearance: She is well-developed.   HENT:      Head: Normocephalic.   Eyes:      General: No scleral icterus.  Cardiovascular:      Rate and Rhythm: Normal rate.   Pulmonary:      Effort: Pulmonary effort is normal.   Abdominal:      General: There is no distension.   Musculoskeletal:         General: Normal range of motion.      Cervical back: Normal range of motion.   Skin:     General: Skin is dry.   Neurological:      Mental Status: She is alert and oriented to person, place, and time.         Labs: Pertinent labs reviewed.      Assessment:  1. Change in stool caliber    2. Diarrhea, unspecified type         Recommendations:  Unclear etiology.  Question dietary intake issue vs possible mild constipation with some overflow.   Will get an x-ray today to determine stool burden.   Start fiber supplement  Continue with increased protein intake  If symptoms do not improve and there is no clear etiology, will plan for colonoscopy for further evaluation.       Follow up to be determined by results of above.          Thank you so much for allowing me to participate in the care of Rajni TIFFANY" Ruiz Almonte, FNP-C

## 2023-10-22 ENCOUNTER — PATIENT MESSAGE (OUTPATIENT)
Dept: PRIMARY CARE CLINIC | Facility: CLINIC | Age: 73
End: 2023-10-22
Payer: MEDICARE

## 2023-10-23 DIAGNOSIS — E03.9 HYPOTHYROIDISM, UNSPECIFIED TYPE: ICD-10-CM

## 2023-10-23 RX ORDER — LEVOTHYROXINE SODIUM 50 UG/1
50 TABLET ORAL
Qty: 90 TABLET | Refills: 3 | Status: SHIPPED | OUTPATIENT
Start: 2023-10-23 | End: 2023-12-14 | Stop reason: SDUPTHER

## 2023-12-07 ENCOUNTER — PATIENT MESSAGE (OUTPATIENT)
Dept: PRIMARY CARE CLINIC | Facility: CLINIC | Age: 73
End: 2023-12-07
Payer: MEDICARE

## 2023-12-14 ENCOUNTER — PATIENT MESSAGE (OUTPATIENT)
Dept: PRIMARY CARE CLINIC | Facility: CLINIC | Age: 73
End: 2023-12-14
Payer: MEDICARE

## 2023-12-14 DIAGNOSIS — E03.9 HYPOTHYROIDISM, UNSPECIFIED TYPE: ICD-10-CM

## 2023-12-14 RX ORDER — LEVOTHYROXINE SODIUM 50 UG/1
50 TABLET ORAL
Qty: 90 TABLET | Refills: 3 | Status: SHIPPED | OUTPATIENT
Start: 2023-12-14

## 2023-12-14 NOTE — TELEPHONE ENCOUNTER
No care due was identified.  [facility] Embedded Care Due Messages. Reference number: [id number].   12/14/2023 1:37:53 PM CST

## 2024-01-04 ENCOUNTER — TELEPHONE (OUTPATIENT)
Dept: PRIMARY CARE CLINIC | Facility: CLINIC | Age: 74
End: 2024-01-04
Payer: MEDICARE

## 2024-03-13 DIAGNOSIS — Z78.0 MENOPAUSE: ICD-10-CM

## 2024-03-27 ENCOUNTER — HOSPITAL ENCOUNTER (OUTPATIENT)
Dept: RADIOLOGY | Facility: HOSPITAL | Age: 74
Discharge: HOME | End: 2024-03-27
Attending: FAMILY MEDICINE
Payer: MEDICARE

## 2024-03-27 VITALS — BODY MASS INDEX: 32.58 KG/M2 | WEIGHT: 161.63 LBS | HEIGHT: 59 IN

## 2024-03-27 DIAGNOSIS — Z12.31 ENCOUNTER FOR SCREENING MAMMOGRAM FOR BREAST CANCER: ICD-10-CM

## 2024-03-27 PROCEDURE — 77067 SCR MAMMO BI INCL CAD: CPT | Mod: TC,PN

## 2024-03-27 PROCEDURE — 77067 SCR MAMMO BI INCL CAD: CPT | Mod: 26,,, | Performed by: RADIOLOGY

## 2024-03-27 PROCEDURE — 77063 BREAST TOMOSYNTHESIS BI: CPT | Mod: 26,,, | Performed by: RADIOLOGY

## 2024-04-03 ENCOUNTER — APPOINTMENT (OUTPATIENT)
Dept: RADIOLOGY | Facility: HOSPITAL | Age: 74
End: 2024-04-03
Attending: FAMILY MEDICINE
Payer: MEDICARE

## 2024-04-03 DIAGNOSIS — Z78.0 MENOPAUSE: ICD-10-CM

## 2024-04-03 PROCEDURE — 77080 DXA BONE DENSITY AXIAL: CPT | Mod: 26,,, | Performed by: RADIOLOGY

## 2024-04-03 PROCEDURE — 77080 DXA BONE DENSITY AXIAL: CPT | Mod: TC

## 2024-04-08 ENCOUNTER — OFFICE VISIT (OUTPATIENT)
Dept: PRIMARY CARE CLINIC | Facility: CLINIC | Age: 74
End: 2024-04-08
Payer: MEDICARE

## 2024-04-08 ENCOUNTER — PATIENT MESSAGE (OUTPATIENT)
Dept: PRIMARY CARE CLINIC | Facility: CLINIC | Age: 74
End: 2024-04-08

## 2024-04-08 VITALS
WEIGHT: 158.94 LBS | HEART RATE: 77 BPM | HEIGHT: 59 IN | TEMPERATURE: 96 F | SYSTOLIC BLOOD PRESSURE: 128 MMHG | RESPIRATION RATE: 18 BRPM | BODY MASS INDEX: 32.04 KG/M2 | DIASTOLIC BLOOD PRESSURE: 78 MMHG | OXYGEN SATURATION: 98 %

## 2024-04-08 DIAGNOSIS — J30.89 NON-SEASONAL ALLERGIC RHINITIS DUE TO OTHER ALLERGIC TRIGGER: ICD-10-CM

## 2024-04-08 DIAGNOSIS — E78.5 HYPERLIPIDEMIA, UNSPECIFIED HYPERLIPIDEMIA TYPE: ICD-10-CM

## 2024-04-08 DIAGNOSIS — M81.0 OSTEOPOROSIS, UNSPECIFIED OSTEOPOROSIS TYPE, UNSPECIFIED PATHOLOGICAL FRACTURE PRESENCE: ICD-10-CM

## 2024-04-08 DIAGNOSIS — E55.9 VITAMIN D DEFICIENCY DISEASE: ICD-10-CM

## 2024-04-08 DIAGNOSIS — E03.9 HYPOTHYROIDISM, UNSPECIFIED TYPE: ICD-10-CM

## 2024-04-08 DIAGNOSIS — R19.5 CHANGE IN STOOL CALIBER: Primary | ICD-10-CM

## 2024-04-08 DIAGNOSIS — Z12.11 COLON CANCER SCREENING: ICD-10-CM

## 2024-04-08 PROCEDURE — 99214 OFFICE O/P EST MOD 30 MIN: CPT | Mod: S$PBB,,, | Performed by: FAMILY MEDICINE

## 2024-04-08 PROCEDURE — 99999 PR PBB SHADOW E&M-EST. PATIENT-LVL IV: CPT | Mod: PBBFAC,,, | Performed by: FAMILY MEDICINE

## 2024-04-08 PROCEDURE — 99214 OFFICE O/P EST MOD 30 MIN: CPT | Mod: PBBFAC,PN | Performed by: FAMILY MEDICINE

## 2024-04-08 PROCEDURE — G2211 COMPLEX E/M VISIT ADD ON: HCPCS | Mod: S$PBB,,, | Performed by: FAMILY MEDICINE

## 2024-04-08 RX ORDER — FLUTICASONE PROPIONATE 50 MCG
2 SPRAY, SUSPENSION (ML) NASAL DAILY
Qty: 16 G | Refills: 11 | Status: SHIPPED | OUTPATIENT
Start: 2024-04-08

## 2024-04-08 NOTE — TELEPHONE ENCOUNTER
No care due was identified.  [facility] Embedded Care Due Messages. Reference number: [id number].   4/08/2024 1:25:26 PM CDT

## 2024-04-08 NOTE — PROGRESS NOTES
Subjective:      Patient ID: Rajni Melchor is a 74 y.o. female.    Chief Complaint: Follow-up (High cholesterol results, dexa scan results, and colonoscopy  ) and Insomnia (Unable to sleep )      Patient is a 74 y.o. female coming in today for lab work f/u. She is accompanied by family today.  Reports decreased bowel movement output. Denies issues with output or blood in stool. Recent DXA scan results reviewed with pt. No significant changes noted; results still show osteoporosis. Denies recent falls or injuries. Has been walking while playing golf for exercise. No other health concern at this time.       1. Change in stool caliber    2. Osteoporosis, unspecified osteoporosis type, unspecified pathological fracture presence    3. Hypothyroidism, unspecified type    4. Hyperlipidemia, unspecified hyperlipidemia type    5. Vitamin D deficiency disease    6. Colon cancer screening       [facility]    8/15/2023 11:30 PM CDT - Filed by Patient   On average, how many days per week do you engage in moderate to strenuous exercise (like a brisk walk)? 0 days   On average, how many minutes do you engage in exercise at this level?    Do you feel stress - tense, restless, nervous, or anxious, or unable to sleep at night because your mind is troubled all the time - these days? Very much   Do you belong to any clubs or organizations such as Worship groups, unions, fraternal or athletic groups, or school groups? Yes   How often do you attend meetings of the clubs or organizations you belong to? More than 4 times per year   In a typical week, how many times do you talk on the phone with family, friends, or neighbors? More than three times a week   How often do you get together with friends or relatives? More than three times a week   Are you , , , , never , or living with a partner?    How hard is it for you to pay for the very basics like food, housing, medical care, and heating?  Not hard at all   Within the past 12 months, you worried that your food would run out before you got the money to buy more. Never true   Within the past 12 months, the food you bought just didnt last and you didnt have money to get more. Never true   In the past 12 months, has lack of transportation kept you from medical appointments or from getting medications? No   In the past 12 months, has lack of transportation kept you from meetings, work, or from getting things needed for daily living? No   How often do you have a drink containing alcohol? 4 or more times a week   How many drinks containing alcohol do you have on a typical day when you are drinking? 1 or 2   How often do you have six or more drinks on one occasion? Never   In the last 12 months, was there a time when you were not able to pay the mortgage or rent on time? No   In the last 12 months, how many places have you lived? (range: at least 0) 1   In the last 12 months, was there a time when you did not have a steady place to sleep or slept in a shelter (including now)? No         Pmh, Psh, Family Hx, Social Hx, HM updated in Epic Tabs today.   Review of Systems   Constitutional:  Negative for chills, fatigue and fever.   HENT:  Negative for ear pain and trouble swallowing.    Eyes:  Negative for pain and visual disturbance.   Respiratory:  Negative for cough and shortness of breath.    Cardiovascular:  Negative for chest pain and leg swelling.   Gastrointestinal:  Negative for abdominal pain, blood in stool, nausea and vomiting.   Endocrine: Negative for cold intolerance and heat intolerance.   Genitourinary:  Negative for dysuria and frequency.   Musculoskeletal:  Negative for joint swelling, myalgias and neck pain.   Skin:  Negative for color change and rash.   Neurological:  Negative for dizziness and headaches.   Psychiatric/Behavioral:  Negative for behavioral problems and sleep disturbance.      Objective:     Vitals:    04/08/24 0958   BP:  "128/78   BP Location: Left arm   Patient Position: Sitting   BP Method: Large (Manual)   Pulse: 77   Resp: 18   Temp: 96.1 °F (35.6 °C)   TempSrc: Tympanic   SpO2: 98%   Weight: 72.1 kg (158 lb 15.2 oz)   Height: 4' 11" (1.499 m)     Wt Readings from Last 10 Encounters:   04/08/24 72.1 kg (158 lb 15.2 oz)   03/27/24 73.3 kg (161 lb 9.6 oz)   09/28/23 73.3 kg (161 lb 9.6 oz)   08/17/23 73.9 kg (162 lb 13 oz)   01/20/23 73.4 kg (161 lb 13.1 oz)   02/08/22 76.1 kg (167 lb 10.6 oz)   12/17/21 75.9 kg (167 lb 5.3 oz)   01/12/21 76.8 kg (169 lb 5 oz)   01/04/21 76.8 kg (169 lb 3.3 oz)   11/09/20 74.3 kg (163 lb 12.8 oz)     Physical Exam  Vitals reviewed.   Constitutional:       Appearance: Normal appearance. She is well-developed. She is obese.   HENT:      Head: Normocephalic and atraumatic.      Right Ear: Tympanic membrane and external ear normal.      Left Ear: Tympanic membrane and external ear normal.      Nose: Nose normal.      Mouth/Throat:      Mouth: Mucous membranes are moist.      Pharynx: Oropharynx is clear.   Eyes:      Conjunctiva/sclera: Conjunctivae normal.      Pupils: Pupils are equal, round, and reactive to light.   Neck:      Thyroid: No thyromegaly.   Cardiovascular:      Rate and Rhythm: Normal rate and regular rhythm.      Heart sounds: Normal heart sounds. No murmur heard.     No friction rub. No gallop.   Pulmonary:      Effort: Pulmonary effort is normal. No respiratory distress.      Breath sounds: Normal breath sounds. No wheezing or rales.   Abdominal:      General: Bowel sounds are normal. There is no distension.      Palpations: Abdomen is soft.      Tenderness: There is no abdominal tenderness. There is no rebound.   Musculoskeletal:         General: Normal range of motion.      Cervical back: Normal range of motion and neck supple.   Lymphadenopathy:      Cervical: No cervical adenopathy.   Skin:     General: Skin is warm and dry.      Findings: No rash.   Neurological:      Mental " Status: She is alert and oriented to person, place, and time.   Psychiatric:         Attention and Perception: Attention and perception normal.         Mood and Affect: Mood and affect normal.         Speech: Speech normal.         Behavior: Behavior normal.         Thought Content: Thought content normal.         Cognition and Memory: Cognition and memory normal.         Judgment: Judgment normal.         Assessment:     1. Change in stool caliber    2. Osteoporosis, unspecified osteoporosis type, unspecified pathological fracture presence    3. Hypothyroidism, unspecified type    4. Hyperlipidemia, unspecified hyperlipidemia type    5. Vitamin D deficiency disease    6. Colon cancer screening        Plan:   Rajni was seen today for follow-up and insomnia.    Diagnoses and all orders for this visit:    Change in stool caliber  -     Ambulatory referral/consult to Endo Procedure ; Future  -     T4, Free; Future  -     Lipid Panel; Future  -     TSH; Future  -     Comprehensive Metabolic Panel; Future  -     CBC Auto Differential; Future  -     Vitamin D; Future    Osteoporosis, unspecified osteoporosis type, unspecified pathological fracture presence  -     T4, Free; Future  -     Lipid Panel; Future  -     TSH; Future  -     Comprehensive Metabolic Panel; Future  -     CBC Auto Differential; Future  -     Vitamin D; Future    Hypothyroidism, unspecified type  -     T4, Free; Future  -     Lipid Panel; Future  -     TSH; Future  -     Comprehensive Metabolic Panel; Future  -     CBC Auto Differential; Future  -     Vitamin D; Future    Hyperlipidemia, unspecified hyperlipidemia type  -     T4, Free; Future  -     Lipid Panel; Future  -     TSH; Future  -     Comprehensive Metabolic Panel; Future  -     CBC Auto Differential; Future    Vitamin D deficiency disease  -     Vitamin D; Future    Colon cancer screening  -     Ambulatory referral/consult to Endo Procedure ; Future      Change in stool  caliber is new problem; no abd pain or blood in stool noted.  Referral given to endoscopy to schedule colonoscopy to assess sx etiology and also due for colonoscopy in 11/2024, but will move up due to changes in stool.   Reviewed dexa and mammogram with patient. Cont with weight bearing exercises. Declines meds.   Lab work ordered to be completed prior to next visit.   Advised to continue exercising regularly.   Instructed to f/u in 4 months for physical exam.     There are no Patient Instructions on file for this visit.    Follow up in about 4 months (around 8/15/2024) for physical with Dr HARDING.      LABS:   Lab Results   Component Value Date    HGBA1C 5.0 08/11/2023    HGBA1C 5.2 01/05/2021    HGBA1C 5.5 01/23/2013      Lab Results   Component Value Date    CHOL 175 08/11/2023    CHOL 173 02/01/2022    CHOL 191 01/05/2021     Lab Results   Component Value Date    LDLCALC 84.4 08/11/2023    LDLCALC 82.8 02/01/2022    LDLCALC 94.8 01/05/2021     Lab Results   Component Value Date    WBC 3.39 (L) 08/11/2023    HGB 14.2 08/11/2023    HCT 42.3 08/11/2023     08/11/2023    CHOL 175 08/11/2023    TRIG 63 08/11/2023    HDL 78 (H) 08/11/2023    ALT 17 08/11/2023    AST 25 08/11/2023     08/11/2023    K 5.0 08/11/2023     08/11/2023    CREATININE 1.0 08/11/2023    BUN 12 08/11/2023    CO2 25 08/11/2023    TSH 3.382 08/11/2023    HGBA1C 5.0 08/11/2023       The 10-year ASCVD risk score (Tari MARADIAGA, et al., 2019) is: 12.5%    Values used to calculate the score:      Age: 74 years      Sex: Female      Is Non- : No      Diabetic: No      Tobacco smoker: No      Systolic Blood Pressure: 128 mmHg      Is BP treated: No      HDL Cholesterol: 78 mg/dL      Total Cholesterol: 175 mg/dL  DXA Bone Density Axial Skeleton 1 or more sites  Narrative: EXAMINATION:  DXA BONE DENSITY AXIAL SKELETON 1 OR MORE SITES    CLINICAL HISTORY:  Asymptomatic menopausal state    TECHNIQUE:  DXA scanning was  performed over the left hip and lumbar spine.  Review of the images confirms satisfactory positioning and technique.    COMPARISON:  02/07/2022    FINDINGS:  The L1 to L4 vertebral bone mineral density is equal to 0.937 g/cm squared with a T score of -2.1.  There has been no significant change relative to the prior study.    The left femoral neck bone mineral density is equal to 0.639 g/cm squared with a T score of -2.9.  There has been  no significant change relative to the prior study.  Impression: Osteoporosis    Consider FDA approved medical therapies in postmenopausal women and men aged 50 years and older, based on the following:    *A hip or vertebral (clinical or morphometric) fracture  *T score less than or equal to -2.5 at the femoral neck or spine after appropriate evaluation to exclude secondary causes.  *Low bone mass -- also known as osteopenia (T score between -1.0 and -2.5 at the femoral neck or spine) and a 10 year probability of hip fracture greater than or equal to 3% or a 10 year probability of major osteoporosis-related fracture greater than or equal to 20% based on the [place]-adapted WHO algorithm.  *Clinicians judgment and/or patient preference may indicate treatment for people with 10 year fracture probabilities is above or below these levels.    Electronically signed by: Oswaldo Burks DO  Date:    04/03/2024  Time:    11:46  Visit today included increased complexity associated with the care of the episodic problem change in stool caliber addressed and managing the longitudinal care of the patient due to the serious and/or complex managed problem(s) hypothyroidism, osteoporosis, vitamin d deficiency.    Scribe Attestation:   ITrenton, am scribing for, and in the presence of, Dr. Kylah Benedict MD. I performed the above scribed service and the documentation accurately describes the services I performed. I attest to the accuracy of the note.    I, Dr. Kylah Benedict MD, reviewed  documentation as scribed above. I personally performed the services described in this documentation.  I agree that the record reflects my personal performance and is accurate and complete. Kylah Benedict MD.    04/08/2024

## 2024-04-09 ENCOUNTER — HOSPITAL ENCOUNTER (OUTPATIENT)
Dept: PREADMISSION TESTING | Facility: HOSPITAL | Age: 74
Discharge: HOME | End: 2024-04-09
Attending: COLON & RECTAL SURGERY
Payer: MEDICARE

## 2024-04-09 DIAGNOSIS — R19.5 CHANGE IN STOOL CALIBER: ICD-10-CM

## 2024-04-09 DIAGNOSIS — Z12.11 COLON CANCER SCREENING: Primary | ICD-10-CM

## 2024-04-22 ENCOUNTER — ANESTHESIA (OUTPATIENT)
Dept: ENDOSCOPY | Facility: HOSPITAL | Age: 74
End: 2024-04-22
Payer: MEDICARE

## 2024-04-22 ENCOUNTER — HOSPITAL ENCOUNTER (OUTPATIENT)
Facility: HOSPITAL | Age: 74
Discharge: HOME | End: 2024-04-22
Attending: INTERNAL MEDICINE | Admitting: INTERNAL MEDICINE
Payer: MEDICARE

## 2024-04-22 ENCOUNTER — ANESTHESIA EVENT (OUTPATIENT)
Dept: ENDOSCOPY | Facility: HOSPITAL | Age: 74
End: 2024-04-22
Payer: MEDICARE

## 2024-04-22 DIAGNOSIS — Z12.11 COLON CANCER SCREENING: Primary | ICD-10-CM

## 2024-04-22 PROCEDURE — 37000008 HC ANESTHESIA 1ST 15 MINUTES: Performed by: INTERNAL MEDICINE

## 2024-04-22 PROCEDURE — 27201012 HC FORCEPS, HOT/COLD, DISP: Performed by: INTERNAL MEDICINE

## 2024-04-22 PROCEDURE — 25000003 PHARM REV CODE 250: Performed by: STUDENT IN AN ORGANIZED HEALTH CARE EDUCATION/TRAINING PROGRAM

## 2024-04-22 PROCEDURE — 88305 TISSUE EXAM BY PATHOLOGIST: CPT | Mod: 26,,, | Performed by: STUDENT IN AN ORGANIZED HEALTH CARE EDUCATION/TRAINING PROGRAM

## 2024-04-22 PROCEDURE — 45385 COLONOSCOPY W/LESION REMOVAL: CPT | Mod: PT,,, | Performed by: INTERNAL MEDICINE

## 2024-04-22 PROCEDURE — 45380 COLONOSCOPY AND BIOPSY: CPT | Mod: 59,PT | Performed by: INTERNAL MEDICINE

## 2024-04-22 PROCEDURE — 27201089 HC SNARE, DISP (ANY): Performed by: INTERNAL MEDICINE

## 2024-04-22 PROCEDURE — 45380 COLONOSCOPY AND BIOPSY: CPT | Mod: 59,PT,, | Performed by: INTERNAL MEDICINE

## 2024-04-22 PROCEDURE — 88305 TISSUE EXAM BY PATHOLOGIST: CPT | Mod: 59 | Performed by: STUDENT IN AN ORGANIZED HEALTH CARE EDUCATION/TRAINING PROGRAM

## 2024-04-22 PROCEDURE — 45385 COLONOSCOPY W/LESION REMOVAL: CPT | Mod: PT | Performed by: INTERNAL MEDICINE

## 2024-04-22 PROCEDURE — 63600175 PHARM REV CODE 636 W HCPCS: Performed by: STUDENT IN AN ORGANIZED HEALTH CARE EDUCATION/TRAINING PROGRAM

## 2024-04-22 RX ORDER — PROPOFOL 10 MG/ML
VIAL (ML) INTRAVENOUS
Status: DISCONTINUED | OUTPATIENT
Start: 2024-04-22 | End: 2024-04-22

## 2024-04-22 RX ORDER — SODIUM CHLORIDE, SODIUM LACTATE, POTASSIUM CHLORIDE, CALCIUM CHLORIDE 600; 310; 30; 20 MG/100ML; MG/100ML; MG/100ML; MG/100ML
INJECTION, SOLUTION INTRAVENOUS CONTINUOUS PRN
Status: DISCONTINUED | OUTPATIENT
Start: 2024-04-22 | End: 2024-04-22

## 2024-04-22 RX ORDER — LIDOCAINE HYDROCHLORIDE 10 MG/ML
INJECTION, SOLUTION EPIDURAL; INFILTRATION; INTRACAUDAL; PERINEURAL
Status: DISCONTINUED | OUTPATIENT
Start: 2024-04-22 | End: 2024-04-22

## 2024-04-22 RX ADMIN — PROPOFOL 50 MG: 10 INJECTION, EMULSION INTRAVENOUS at 12:04

## 2024-04-22 RX ADMIN — LIDOCAINE HYDROCHLORIDE 50 MG: 10 SOLUTION INTRAVENOUS at 12:04

## 2024-04-22 RX ADMIN — SODIUM CHLORIDE, SODIUM LACTATE, POTASSIUM CHLORIDE, AND CALCIUM CHLORIDE: 600; 310; 30; 20 INJECTION, SOLUTION INTRAVENOUS at 12:04

## 2024-04-22 RX ADMIN — PROPOFOL 100 MG: 10 INJECTION, EMULSION INTRAVENOUS at 12:04

## 2024-04-22 NOTE — TRANSFER OF CARE
Anesthesia Transfer of Care Note    Patient: Rajni Melchor    Procedure(s) Performed: Procedure(s) (LRB):  COLONOSCOPY (N/A)    Patient location: PACU    Anesthesia Type: MAC    Transport from OR: Transported from OR on room air with adequate spontaneous ventilation    Post pain: adequate analgesia    Post assessment: no apparent anesthetic complications    Post vital signs: stable    Level of consciousness: responds to stimulation and awake    Nausea/Vomiting: no nausea/vomiting    Complications: none    Transfer of care protocol was followedComments: Report given to PACU RN at bedside. Hand off tool used. RN given opportunity to ask questions or clarify concerns. No Concerns verbalized. RN was asked if ready to assume care of patient. RN verbally confirmed. Pt. left in stable condition. SV. Vital Signs Return to Near Baseline. No s/s of distress noted.     Last vitals: Visit Vitals  BP (!) 152/67 (BP Location: Left arm, Patient Position: Lying)   Pulse 61   Temp 36.6 °C (97.9 °F) (Temporal)   Resp 18   LMP  (LMP Unknown)   SpO2 99%

## 2024-04-22 NOTE — H&P
Endoscopy History and Physical    PCP - Kylah Benedict MD  Referring Physician - Kylah Benedict MD  [id number] [facility]  [place]  [place] [address]      ASA - per anesthesia  Mallampati - per anesthesia  History of Anesthesia problems - no  Family history Anesthesia problems -  no   Plan of anesthesia - General    HPI  74 y.o. female    Planned Procedure: Colonoscopy  Diagnosis: screening for colon cancer  Chief Complaint: Same as above    Personnel H/o colon polyps:yes  FH of colon cancer:no  Anticoagulation:no      ROS:  Constitutional: No fevers, chills, No weight loss  CV: No chest pain  Pulm: No cough, No shortness of breath  GI: see HPI    Medical History:  has a past medical history of Allergic rhinitis, Allergic rhinitis, Arthritis, Basal cell carcinoma, Cancer, History of colon polyps (05/05/2016), Hyperlipidemia, Hypothyroidism, Osteopenia, Osteoporosis, S/P nasal septoplasty, Trouble in sleeping, and Vitamin D deficiency disease.    Surgical History:  has a past surgical history that includes metal armida in right arm; Appendectomy; Nasal septoplasty w/ turbinoplasty; bitubal ligation; Shoulder surgery; Wrist surgery; Colonoscopy (N/A, 05/05/2016); and Colonoscopy (N/A, 11/12/2019).    Family History: family history includes Breast cancer in her niece, niece, paternal cousin, and paternal cousin; Cancer in her father; Colon cancer in her maternal uncle; Diabetes in her mother and sister; Hypertension in her mother; Melanoma in her maternal uncle; Pacemaker/defibrilator in her sister..    Social History:  reports that she quit smoking about 36 years ago. Her smoking use included cigarettes. She started smoking about 52 years ago. She has a 32 pack-year smoking history. She has never used smokeless tobacco. She reports current alcohol use of about 14.0 standard drinks of alcohol per week. She reports that she does not use drugs.    Review of patient's allergies indicates:   Allergen Reactions    Augmentin  [amoxicillin-pot clavulanate] Nausea And Vomiting    Latex     Adhesive Rash     Blastic band aid       Medications:   Medications Prior to Admission   Medication Sig Dispense Refill Last Dose    cholecalciferol, vitamin D3, (VITAMIN D3) 2,000 unit Cap Take 1 capsule (2,000 Units total) by mouth once daily. 100 capsule 3 4/21/2024    fexofenadine (ALLEGRA) 180 MG tablet Take 180 mg by mouth once daily.   4/21/2024    fluticasone propionate (FLONASE) 50 mcg/actuation nasal spray 2 sprays (100 mcg total) by Each Nostril route once daily. 16 g 11 4/21/2024    SYNTHROID 50 mcg tablet Take 1 tablet (50 mcg total) by mouth before breakfast. 90 tablet 3 4/21/2024    ZINC ACETATE ORAL Take by mouth.   4/21/2024    acyclovir (ZOVIRAX) 400 MG tablet TAKE ONE TABLET BY MOUTH BID to prevent fever blister, but can increase to 5 TIMES A DAY FOR 3-5 DAYS FOR FLARE UP FEVER BLISTERS. 180 tablet 3 More than a month    ascorbic acid, vitamin C, (VITAMIN C) 1000 MG tablet Take 1,000 mg by mouth once daily.   More than a month    clobetasoL (TEMOVATE) 0.05 % external solution        fluocinolone (SYNALAR) 0.01 % external solution AAA of scalp qd to bid prn scalp itching. 60 mL 1     triamcinolone acetonide 0.1% (KENALOG) 0.1 % cream Apply topically 4 (four) times daily. abdomen/ legs, arms . 456 g 1        Physical Exam:    Vital Signs:   Vitals:    04/22/24 1156   BP: (!) 152/67   Pulse: 61   Resp: 18   Temp: 97.9 °F (36.6 °C)       General Appearance: Well appearing in no acute distress  Abdomen: Soft, non tender, non distended with normal bowel sounds, no masses    Labs:  Lab Results   Component Value Date    WBC 3.39 (L) 08/11/2023    HGB 14.2 08/11/2023    HCT 42.3 08/11/2023     08/11/2023    CHOL 175 08/11/2023    TRIG 63 08/11/2023    HDL 78 (H) 08/11/2023    ALT 17 08/11/2023    AST 25 08/11/2023     08/11/2023    K 5.0 08/11/2023     08/11/2023    CREATININE 1.0 08/11/2023    BUN 12 08/11/2023    CO2 25  08/11/2023    TSH 3.382 08/11/2023    HGBA1C 5.0 08/11/2023       I have explained the risks and benefits of this endoscopic procedure to the patient including but not limited to bleeding, inflammation, infection, perforation, and death.    SEDATION PLAN: per anesthesia       History reviewed, vital signs satisfactory, cardiopulmonary status satisfactory, sedation options, risks and plans have been discussed with the patient  All their questions were answered and the patient agrees to the sedation procedures as planned and the patient is deemed an appropriate candidate for the sedation as planned.     The risks, benefits and alternatives of the procedure were discussed with the patient in detail. This discussion was had in the presence of endoscopy staff. The risks include, risks of adverse reaction to sedation requiring the use of reversal agents, bleeding requiring blood transfusion, perforation requiring surgical intervention and technical failure. Other risks include aspiration leading to respiratory distress and respiratory failure resulting in endotracheal intubation and mechanical ventilation including death. If anesthesia is being utilized for this procedure, it is up to the anesthesiologist to determine airway safety including elective endotracheal intubation. Questions were answered, they agree to proceed. There was no language barriers.       Procedure explained to patient, informed consent obtained and placed in chart.       Héctor Ruth MD

## 2024-04-22 NOTE — ANESTHESIA POSTPROCEDURE EVALUATION
Anesthesia Post Evaluation    Patient: Rajni Melchor    Procedure(s) Performed: Procedure(s) (LRB):  COLONOSCOPY (N/A)    Final Anesthesia Type: MAC      Patient location during evaluation: PACU  Patient participation: Yes- Able to Participate  Level of consciousness: awake and alert and oriented  Post-procedure vital signs: reviewed and stable  Pain management: adequate  Airway patency: patent  DEE mitigation strategies: Multimodal analgesia and Extubation and recovery carried out in lateral, semiupright, or other nonsupine position  PONV status at discharge: No PONV  Anesthetic complications: no      Cardiovascular status: blood pressure returned to baseline and hemodynamically stable  Respiratory status: unassisted and spontaneous ventilation  Hydration status: euvolemic  Follow-up not needed.  Comments: Report given to PACU RN. Hand Off Tool Used. RN given opportunity to ask questions or clarify concerns. No Concerns verbalized. RN was asked if ready to assume care of patient. RN verbally confirmed. Pt. Left in stable condition. SV. Vital Signs Return to Near Baseline. No s/s of distress noted.             No case tracking events are documented in the log.      Pain/Ross Score: No data recorded

## 2024-04-22 NOTE — ANESTHESIA PREPROCEDURE EVALUATION
04/22/2024  Rajni Melchor is a 74 y.o., female.    Anesthesia Evaluation    I have reviewed the Patient Summary Reports.     I have reviewed the Nursing Notes.    I have reviewed the Medications.     Review of Systems  Anesthesia Hx:  No problems with previous Anesthesia             Denies Family Hx of Anesthesia complications.    Denies Personal Hx of Anesthesia complications.                    Social:  Former Smoker, Social Alcohol Use       Hematology/Oncology:  Hematology Normal                                     Cardiovascular:                hyperlipidemia   ECG has been reviewed.                          Pulmonary:  Pulmonary Normal                       Renal/:  Renal/ Normal                 Hepatic/GI:  Hepatic/GI Normal                 Musculoskeletal:  Arthritis               Neurological:  Neurology Normal                                      Endocrine:   Hypothyroidism          Psych:  Psychiatric Normal                  Physical Exam  General:  Obesity       Airway/Jaw/Neck:  Airway Findings: Mouth Opening: Small, but > 3cm     Tongue: Normal      General Airway Assessment: Adult      Mallampati: II   TM Distance: Normal, at least 6 cm   Jaw/Neck Findings:     Neck ROM: Normal ROM   Neck Findings:       Eyes/Ears/Nose:  Eyes/Ears/Nose Findings:     Dental:  Dental Findings: In tact, Upper Dentures, Lower partial dentures      Chest/Lungs:  Chest/Lungs Findings:  Clear to auscultation, Normal Respiratory Rate       Heart/Vascular:  Heart Findings: Rate: Normal  Rhythm: Regular Rhythm  Sounds: Normal  Heart murmur: negative       Vascular Findings: Normal           Abdomen:  Abdomen Findings: Normal      Musculoskeletal:  Musculoskeletal Findings: Normal   Skin:  Skin Findings: Normal      Mental Status:  Mental Status Findings:  Alert and Oriented         Anesthesia Plan  Type of  Anesthesia, risks & benefits discussed:  Anesthesia Type:  MAC    Patient's Preference:   Plan Factors:          Intra-op Monitoring Plan: standard ASA monitors  Intra-op Monitoring Plan Comments:   Post Op Pain Control Plan: per primary service following discharge from PACU  Post Op Pain Control Plan Comments:     Induction:   IV  Beta Blocker:  Patient is not currently on a Beta-Blocker (No further documentation required).       Informed Consent: Informed consent signed with the Patient and all parties understand the risks and agree with anesthesia plan.  All questions answered.  Anesthesia consent signed with patient.  ASA Score: 2     Day of Surgery Review of History & Physical:              Ready For Surgery From Anesthesia Perspective.           Physical Exam  General: Obesity    Airway:  Mallampati: II   Mouth Opening: Small, but > 3cm  TM Distance: Normal, at least 6 cm  Tongue: Normal  Neck ROM: Normal ROM    Dental:  In tact, Upper Dentures, Lower partial dentures    Chest/Lungs:  Clear to auscultation, Normal Respiratory Rate    Heart:  Rate: Normal  Rhythm: Regular Rhythm  Sounds: Normal    Anesthesia Plan  Type of Anesthesia, risks & benefits discussed:    Anesthesia Type: MAC  Intra-op Monitoring Plan: standard ASA monitors  Post Op Pain Control Plan: per primary service following discharge from PACU  Induction:  IV  Informed Consent: Informed consent signed with the Patient and all parties understand the risks and agree with anesthesia plan.  All questions answered.   ASA Score: 2    Ready For Surgery From Anesthesia Perspective.   .

## 2024-04-22 NOTE — DISCHARGE SUMMARY
[facility] - Endoscopy (Hospital)  Discharge Note  Short Stay    Procedure(s) (LRB):  COLONOSCOPY (N/A)      OUTCOME: Patient tolerated treatment/procedure well without complication and is now ready for discharge.    DISPOSITION: Home or Self Care    FINAL DIAGNOSIS:  Colon cancer screening    FOLLOWUP: With primary care provider    DISCHARGE INSTRUCTIONS:  No discharge procedures on file.     TIME SPENT ON DISCHARGE: 20 minutes

## 2024-04-22 NOTE — PROVATION PATIENT INSTRUCTIONS
Discharge Summary/Instructions after an Endoscopic Procedure  Patient Name: Rajni Melchor  Patient MRN: [id number]  Patient YOB: 1950  Monday, April 22, 2024 Héctor Ruth MD  Dear patient,  As a result of recent federal legislation (The Federal Cures Act), you may   receive lab or pathology results from your procedure in your [facility]   account before your physician is able to contact you. Your physician or   their representative will relay the results to you with their   recommendations at their soonest availability.  Thank you,  RESTRICTIONS:  During your procedure today, you received medications for sedation.  These   medications may affect your judgment, balance and coordination.  Therefore,   for 24 hours, you have the following restrictions:   - DO NOT drive a car, operate machinery, make legal/financial decisions,   sign important papers or drink alcohol.    ACTIVITY:  Today: no heavy lifting, straining or running due to procedural   sedation/anesthesia.  The following day: return to full activity including work.  DIET:  Eat and drink normally unless instructed otherwise.     TREATMENT FOR COMMON SIDE EFFECTS:  - Mild abdominal pain, nausea, belching, bloating or excessive gas:  rest,   eat lightly and use a heating pad.  - Sore Throat: treat with throat lozenges and/or gargle with warm salt   water.  - Because air was used during the procedure, expelling large amounts of air   from your rectum or belching is normal.  - If a bowel prep was taken, you may not have a bowel movement for 1-3 days.    This is normal.  SYMPTOMS TO WATCH FOR AND REPORT TO YOUR PHYSICIAN:  1. Abdominal pain or bloating, other than gas cramps.  2. Chest pain.  3. Back pain.  4. Signs of infection such as: chills or fever occurring within 24 hours   after the procedure.  5. Rectal bleeding, which would show as bright red, maroon, or black stools.   (A tablespoon of blood from the rectum is not serious,  especially if   hemorrhoids are present.)  6. Vomiting.  7. Weakness or dizziness.  GO DIRECTLY TO THE NEAREST EMERGENCY ROOM IF YOU HAVE ANY OF THE FOLLOWING:      Difficulty breathing              Chills and/or fever over 101 F   Persistent vomiting and/or vomiting blood   Severe abdominal pain   Severe chest pain   Black, tarry stools   Bleeding- more than one tablespoon   Any other symptom or condition that you feel may need urgent attention  Your doctor recommends these additional instructions:  If any biopsies were taken, your doctors clinic will contact you in 1 to 2   weeks with any results.  - Discharge patient to home.   - Resume previous diet.   - Continue present medications.   - Await pathology results.   - Repeat colonoscopy in 5 years for surveillance based on clinical status at   that time.   - Return to referring physician as previously scheduled.   - Patient has a contact number available for emergencies.  The signs and   symptoms of potential delayed complications were discussed with the   patient.  Return to normal activities tomorrow.  Written discharge   instructions were provided to the patient.  For questions, problems or results please call your physician Héctor Ruth MD at Work:  [phone]  If you have any questions about the above instructions, call the GI   department at [phone] or call the endoscopy unit at [phone]   from 7am until 3 pm.  [facility] [place], EMERGENCY ROOM PHONE NUMBER:   [phone]  IF A COMPLICATION OR EMERGENCY SITUATION ARISES AND YOU ARE UNABLE TO REACH   YOUR PHYSICIAN - GO DIRECTLY TO THE EMERGENCY ROOM.  I have read or have had read to me these discharge instructions for my   procedure and have received a written copy.  I understand these   instructions and will follow-up with my physician if I have any questions.     __________________________________       _____________________________________  Nurse Signature                                           Patient/Designated   Responsible Party Signature  MD Héctor Jain MD  4/22/2024 12:56:27 PM  This report has been verified and signed electronically.  Dear patient,  As a result of recent federal legislation (The Federal Cures Act), you may   receive lab or pathology results from your procedure in your [facility]   account before your physician is able to contact you. Your physician or   their representative will relay the results to you with their   recommendations at their soonest availability.  Thank you,  PROVATION

## 2024-04-23 VITALS
DIASTOLIC BLOOD PRESSURE: 73 MMHG | SYSTOLIC BLOOD PRESSURE: 125 MMHG | HEART RATE: 82 BPM | RESPIRATION RATE: 18 BRPM | OXYGEN SATURATION: 98 % | TEMPERATURE: 98 F

## 2024-04-25 LAB
FINAL PATHOLOGIC DIAGNOSIS: NORMAL
GROSS: NORMAL
Lab: NORMAL
MICROSCOPIC EXAM: NORMAL
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
## 2024-06-25 ENCOUNTER — PATIENT MESSAGE (OUTPATIENT)
Dept: PRIMARY CARE CLINIC | Facility: CLINIC | Age: 74
End: 2024-06-25
Payer: MEDICARE

## 2024-06-27 ENCOUNTER — HOSPITAL ENCOUNTER (OUTPATIENT)
Dept: RADIOLOGY | Facility: HOSPITAL | Age: 74
Discharge: HOME | End: 2024-06-27
Attending: NURSE PRACTITIONER
Payer: MEDICARE

## 2024-06-27 ENCOUNTER — OFFICE VISIT (OUTPATIENT)
Dept: PRIMARY CARE CLINIC | Facility: CLINIC | Age: 74
End: 2024-06-27
Payer: MEDICARE

## 2024-06-27 VITALS
OXYGEN SATURATION: 97 % | DIASTOLIC BLOOD PRESSURE: 74 MMHG | HEART RATE: 70 BPM | TEMPERATURE: 97 F | HEIGHT: 59 IN | BODY MASS INDEX: 32.44 KG/M2 | WEIGHT: 160.94 LBS | SYSTOLIC BLOOD PRESSURE: 126 MMHG

## 2024-06-27 DIAGNOSIS — E66.09 CLASS 1 OBESITY DUE TO EXCESS CALORIES WITHOUT SERIOUS COMORBIDITY WITH BODY MASS INDEX (BMI) OF 32.0 TO 32.9 IN ADULT: ICD-10-CM
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
## 2024-06-27 DIAGNOSIS — R19.02 LEFT UPPER QUADRANT ABDOMINAL MASS: ICD-10-CM

## 2024-06-27 DIAGNOSIS — E78.5 HYPERLIPIDEMIA, UNSPECIFIED HYPERLIPIDEMIA TYPE: ICD-10-CM

## 2024-06-27 DIAGNOSIS — E03.9 HYPOTHYROIDISM, UNSPECIFIED TYPE: ICD-10-CM

## 2024-06-27 DIAGNOSIS — R07.81 RIB PAIN ON LEFT SIDE: ICD-10-CM

## 2024-06-27 DIAGNOSIS — S29.9XXA RIB INJURY: ICD-10-CM

## 2024-06-27 PROCEDURE — 76705 ECHO EXAM OF ABDOMEN: CPT | Mod: 26,,, | Performed by: RADIOLOGY

## 2024-06-27 PROCEDURE — 76705 ECHO EXAM OF ABDOMEN: CPT | Mod: TC,PN

## 2024-06-27 PROCEDURE — 99999 PR PBB SHADOW E&M-EST. PATIENT-LVL V: CPT | Mod: PBBFAC,,, | Performed by: NURSE PRACTITIONER

## 2024-06-27 PROCEDURE — 71100 X-RAY EXAM RIBS UNI 2 VIEWS: CPT | Mod: 26,LT,, | Performed by: RADIOLOGY

## 2024-06-27 PROCEDURE — 71100 X-RAY EXAM RIBS UNI 2 VIEWS: CPT | Mod: TC,PN,LT

## 2024-06-27 PROCEDURE — 99215 OFFICE O/P EST HI 40 MIN: CPT | Mod: PBBFAC,25,PN | Performed by: NURSE PRACTITIONER

## 2024-06-27 RX ORDER — DICLOFENAC SODIUM 10 MG/G
2 GEL TOPICAL 4 TIMES DAILY PRN
Qty: 100 G | Refills: 3 | Status: SHIPPED | OUTPATIENT
Start: 2024-06-27

## 2024-06-27 RX ORDER — TRAMADOL HYDROCHLORIDE 50 MG/1
50 TABLET ORAL 2 TIMES DAILY PRN
Qty: 20 TABLET | Refills: 0 | Status: SHIPPED | OUTPATIENT
Start: 2024-06-27
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
## 2024-07-01 ENCOUNTER — TELEPHONE (OUTPATIENT)
Dept: PRIMARY CARE CLINIC | Facility: CLINIC | Age: 74
End: 2024-07-01
Payer: MEDICARE

## 2024-07-02 ENCOUNTER — OFFICE VISIT (OUTPATIENT)
Dept: PRIMARY CARE CLINIC | Facility: CLINIC | Age: 74
End: 2024-07-02
Payer: MEDICARE

## 2024-07-02 DIAGNOSIS — E66.09 CLASS 1 OBESITY DUE TO EXCESS CALORIES WITHOUT SERIOUS COMORBIDITY WITH BODY MASS INDEX (BMI) OF 32.0 TO 32.9 IN ADULT: ICD-10-CM

## 2024-07-02 DIAGNOSIS — S22.32XA CLOSED FRACTURE OF ONE RIB OF LEFT SIDE, INITIAL ENCOUNTER: Primary | ICD-10-CM

## 2024-07-02 DIAGNOSIS — S29.9XXA RIB INJURY: ICD-10-CM

## 2024-07-02 DIAGNOSIS — M81.0 OSTEOPOROSIS, UNSPECIFIED OSTEOPOROSIS TYPE, UNSPECIFIED PATHOLOGICAL FRACTURE PRESENCE: ICD-10-CM
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
## 2024-07-02 DIAGNOSIS — J98.11 MILD BIBASILAR ATELECTASIS: ICD-10-CM

## 2024-07-02 DIAGNOSIS — R07.81 RIB PAIN ON LEFT SIDE: ICD-10-CM

## 2024-07-02 PROCEDURE — 99214 OFFICE O/P EST MOD 30 MIN: CPT | Mod: 95,,, | Performed by: NURSE PRACTITIONER

## 2024-07-02 PROCEDURE — G2211 COMPLEX E/M VISIT ADD ON: HCPCS | Mod: 95,,, | Performed by: NURSE PRACTITIONER

## 2024-07-02 NOTE — PROGRESS NOTES
Assessment & Plan  Problem List Items Addressed This Visit    None  Visit Diagnoses       Fall, subsequent encounter    -  Primary    Rib pain on left side        Rib injury        Closed fracture of one rib of left side, initial encounter   - We discussed continued weight bearing monitoring to allow healing of 7th left rib  - No golf for now     - Continue Diclofenac gel as needed  - She only use Tramadol once due to the medication inducing tiredness and lethargy    Mild bibasilar atelectasis      - Stable; Monitor  - I encouraged continued deep breathing exercises.               Health Maintenance reviewed: Deferred at this time.    The patient location is:  Patient appears to be home.   The chief complaint leading to consultation is: noted below  Visit type: Virtual visit with synchronous audio and video  Total time spent with patient: 20+ minutes  Each patient to whom he or she provides medical services by telemedicine is:  (1) informed of the relationship between the physician and patient and the respective role of any other health care provider with respect to management of the patient; and (2) notified that he or she may decline to receive medical services by telemedicine and may withdraw from such care at any time.    Follow-up: If symptoms worsen or fail to improve.    Sincerely,  Sukhi Martinez NP     Chief Complaint  No chief complaint on file.      HPI  Rajni Melchor is a 74 y.o. female with multiple medical diagnoses as listed in the medical history and problem list that presents for CXR Review/7th Rib Fracture via virtual visit.  Their last appointment 6/27/2024.      CXR Review/7th Rib Fracture: She presents a stable- bright affect. Reports pain at the left rib region is no longer stabbing but presents more as discomfort since treating pain. She is now able to take a bath; she had been using the shower over the past two weeks but is now able to use the bath tub again. Denies SOB, difficulty  breathing, chest pain or coughing spells.        ROS  Review of Systems   Constitutional:  Positive for activity change. Negative for unexpected weight change.   HENT:  Negative for hearing loss, rhinorrhea and trouble swallowing.    Eyes:  Negative for discharge and visual disturbance.   Respiratory:  Negative for chest tightness and wheezing.    Cardiovascular:  Negative for chest pain and palpitations.   Gastrointestinal:  Negative for blood in stool, constipation, diarrhea and vomiting.   Endocrine: Negative for polydipsia and polyuria.   Genitourinary:  Negative for difficulty urinating, dysuria, hematuria and menstrual problem.   Musculoskeletal:  Positive for arthralgias. Negative for joint swelling and neck pain.   Neurological:  Negative for weakness and headaches.   Psychiatric/Behavioral:  Negative for confusion and dysphoric mood.            Physical Exam  Physical Exam  Constitutional:       Appearance: Normal appearance.   HENT:      Head: Normocephalic and atraumatic.   Pulmonary:      Effort: Pulmonary effort is normal.   Neurological:      Mental Status: She is alert and oriented to person, place, and time.   Psychiatric:         Mood and Affect: Mood normal.

## 2024-08-12 ENCOUNTER — LAB VISIT (OUTPATIENT)
Dept: LAB | Facility: HOSPITAL | Age: 74
End: 2024-08-12
Attending: FAMILY MEDICINE
Payer: MEDICARE

## 2024-08-12 DIAGNOSIS — R19.5 CHANGE IN STOOL CALIBER: ICD-10-CM

## 2024-08-12 DIAGNOSIS — E55.9 VITAMIN D DEFICIENCY DISEASE: ICD-10-CM

## 2024-08-12 DIAGNOSIS — E78.5 HYPERLIPIDEMIA, UNSPECIFIED HYPERLIPIDEMIA TYPE: ICD-10-CM

## 2024-08-12 DIAGNOSIS — M81.0 OSTEOPOROSIS, UNSPECIFIED OSTEOPOROSIS TYPE, UNSPECIFIED PATHOLOGICAL FRACTURE PRESENCE: ICD-10-CM

## 2024-08-12 DIAGNOSIS — E03.9 HYPOTHYROIDISM, UNSPECIFIED TYPE: ICD-10-CM

## 2024-08-12 LAB
25(OH)D3+25(OH)D2 SERPL-MCNC: 42 NG/ML (ref 30–96)
ALBUMIN SERPL BCP-MCNC: 4 G/DL (ref 3.5–5.2)
ALP SERPL-CCNC: 65 U/L (ref 55–135)
ALT SERPL W/O P-5'-P-CCNC: 18 U/L (ref 10–44)
ANION GAP SERPL CALC-SCNC: 7 MMOL/L (ref 8–16)
AST SERPL-CCNC: 25 U/L (ref 10–40)
BASOPHILS # BLD AUTO: 0.04 K/UL (ref 0–0.2)
BASOPHILS NFR BLD: 1.1 % (ref 0–1.9)
BILIRUB SERPL-MCNC: 1.1 MG/DL (ref 0.1–1)
BUN SERPL-MCNC: 13 MG/DL (ref 8–23)
CALCIUM SERPL-MCNC: 9.8 MG/DL (ref 8.7–10.5)
CHLORIDE SERPL-SCNC: 106 MMOL/L (ref 95–110)
CHOLEST SERPL-MCNC: 186 MG/DL (ref 120–199)
CHOLEST/HDLC SERPL: 2.2 {RATIO} (ref 2–5)
CO2 SERPL-SCNC: 27 MMOL/L (ref 23–29)
CREAT SERPL-MCNC: 1 MG/DL (ref 0.5–1.4)
DIFFERENTIAL METHOD BLD: ABNORMAL
EOSINOPHIL # BLD AUTO: 0.1 K/UL (ref 0–0.5)
EOSINOPHIL NFR BLD: 3.7 % (ref 0–8)
ERYTHROCYTE [DISTWIDTH] IN BLOOD BY AUTOMATED COUNT: 12.2 % (ref 11.5–14.5)
EST. GFR  (NO RACE VARIABLE): 59.5 ML/MIN/1.73 M^2
GLUCOSE SERPL-MCNC: 86 MG/DL (ref 70–110)
HCT VFR BLD AUTO: 43.8 % (ref 37–48.5)
HDLC SERPL-MCNC: 86 MG/DL (ref 40–75)
HDLC SERPL: 46.2 % (ref 20–50)
HGB BLD-MCNC: 14.3 G/DL (ref 12–16)
IMM GRANULOCYTES # BLD AUTO: 0.01 K/UL (ref 0–0.04)
IMM GRANULOCYTES NFR BLD AUTO: 0.3 % (ref 0–0.5)
LDLC SERPL CALC-MCNC: 88.6 MG/DL (ref 63–159)
LYMPHOCYTES # BLD AUTO: 0.9 K/UL (ref 1–4.8)
LYMPHOCYTES NFR BLD: 24.3 % (ref 18–48)
MCH RBC QN AUTO: 31.5 PG (ref 27–31)
MCHC RBC AUTO-ENTMCNC: 32.6 G/DL (ref 32–36)
MCV RBC AUTO: 97 FL (ref 82–98)
MONOCYTES # BLD AUTO: 0.3 K/UL (ref 0.3–1)
MONOCYTES NFR BLD: 7.9 % (ref 4–15)
NEUTROPHILS # BLD AUTO: 2.2 K/UL (ref 1.8–7.7)
NEUTROPHILS NFR BLD: 62.7 % (ref 38–73)
NONHDLC SERPL-MCNC: 100 MG/DL
NRBC BLD-RTO: 0 /100 WBC
PLATELET # BLD AUTO: 178 K/UL (ref 150–450)
PMV BLD AUTO: 10.8 FL (ref 9.2–12.9)
POTASSIUM SERPL-SCNC: 5.2 MMOL/L (ref 3.5–5.1)
PROT SERPL-MCNC: 6.8 G/DL (ref 6–8.4)
RBC # BLD AUTO: 4.54 M/UL (ref 4–5.4)
SODIUM SERPL-SCNC: 140 MMOL/L (ref 136–145)
T4 FREE SERPL-MCNC: 1.08 NG/DL (ref 0.71–1.51)
TRIGL SERPL-MCNC: 57 MG/DL (ref 30–150)
TSH SERPL DL<=0.005 MIU/L-ACNC: 3.26 UIU/ML (ref 0.4–4)
WBC # BLD AUTO: 3.54 K/UL (ref 3.9–12.7)

## 2024-08-12 PROCEDURE — 85025 COMPLETE CBC W/AUTO DIFF WBC: CPT | Performed by: FAMILY MEDICINE

## 2024-08-12 PROCEDURE — 80053 COMPREHEN METABOLIC PANEL: CPT | Performed by: FAMILY MEDICINE

## 2024-08-12 PROCEDURE — 80061 LIPID PANEL: CPT | Performed by: FAMILY MEDICINE

## 2024-08-12 PROCEDURE — 84439 ASSAY OF FREE THYROXINE: CPT | Performed by: FAMILY MEDICINE

## 2024-08-12 PROCEDURE — 84443 ASSAY THYROID STIM HORMONE: CPT | Performed by: FAMILY MEDICINE

## 2024-08-12 PROCEDURE — 82306 VITAMIN D 25 HYDROXY: CPT | Performed by: FAMILY MEDICINE

## 2024-08-12 PROCEDURE — 36415 COLL VENOUS BLD VENIPUNCTURE: CPT | Mod: PN | Performed by: FAMILY MEDICINE

## 2024-09-16 NOTE — TELEPHONE ENCOUNTER
No care due was identified.  [facility] Embedded Care Due Messages. Reference number: [id number].   10/23/2023 8:47:19 AM CDT   Detail Level: Detailed Depth Of Biopsy: dermis Was A Bandage Applied: Yes Size Of Lesion In Cm: 0.6 X Size Of Lesion In Cm: 0 Anticipated Plan (Based On Presumed Biopsy Results): MOHS Biopsy Type: H and E Biopsy Method: Dermablade Anesthesia Type: 2% lidocaine without epinephrine Anesthesia Volume In Cc: 0.5 Hemostasis: Electrocautery Wound Care: Petrolatum Dressing: bandage Destruction After The Procedure: No Type Of Destruction Used: Curettage Curettage Text: The wound bed was treated with curettage after the biopsy was performed. Cryotherapy Text: The wound bed was treated with cryotherapy after the biopsy was performed. Electrodesiccation Text: The wound bed was treated with electrodesiccation after the biopsy was performed. Electrodesiccation And Curettage Text: The wound bed was treated with electrodesiccation and curettage after the biopsy was performed. Silver Nitrate Text: The wound bed was treated with silver nitrate after the biopsy was performed. Lab: [id number] Consent: Written consent was obtained and risks were reviewed including but not limited to scarring, infection, bleeding, scabbing, incomplete removal, nerve damage and allergy to anesthesia. Post-Care Instructions: I reviewed with the patient in detail post-care instructions. Patient is to keep the biopsy site dry overnight, and then apply bacitracin twice daily until healed. Patient may apply hydrogen peroxide soaks to remove any crusting. Notification Instructions: Patient will be notified of biopsy results. However, patient instructed to call the office if not contacted within 2 weeks. Billing Type: Third-Party Bill Information: Selecting Yes will display possible errors in your note based on the variables you have selected. This validation is only offered as a suggestion for you. PLEASE NOTE THAT THE VALIDATION TEXT WILL BE REMOVED WHEN YOU FINALIZE YOUR NOTE. IF YOU WANT TO FAX A PRELIMINARY NOTE YOU WILL NEED TO TOGGLE THIS TO 'NO' IF YOU DO NOT WANT IT IN YOUR FAXED NOTE.

## 2024-10-22 DIAGNOSIS — E03.9 HYPOTHYROIDISM, UNSPECIFIED TYPE: ICD-10-CM

## 2024-10-22 RX ORDER — LEVOTHYROXINE SODIUM 50 UG/1
50 TABLET ORAL
Qty: 90 TABLET | Refills: 3 | Status: SHIPPED | OUTPATIENT
Start: 2024-10-22

## 2024-10-22 NOTE — TELEPHONE ENCOUNTER
No care due was identified.  [facility] Embedded Care Due Messages. Reference number: [id number].   10/22/2024 8:29:38 AM CDT

## 2024-10-23 DIAGNOSIS — J30.89 NON-SEASONAL ALLERGIC RHINITIS DUE TO OTHER ALLERGIC TRIGGER: ICD-10-CM

## 2024-10-23 RX ORDER — FLUTICASONE PROPIONATE 50 MCG
2 SPRAY, SUSPENSION (ML) NASAL DAILY
Qty: 16 G | Refills: 11 | Status: SHIPPED | OUTPATIENT
Start: 2024-10-23

## 2024-10-23 NOTE — TELEPHONE ENCOUNTER
No care due was identified.  [facility] Embedded Care Due Messages. Reference number: [id number].   10/23/2024 3:22:59 PM CDT

## 2025-02-21 DIAGNOSIS — Z00.00 ENCOUNTER FOR MEDICARE ANNUAL WELLNESS EXAM: ICD-10-CM

## 2025-03-11 PROBLEM — N18.31 STAGE 3A CHRONIC KIDNEY DISEASE: Status: ACTIVE | Noted: 2025-03-11

## 2025-03-11 PROBLEM — B00.1 FEVER BLISTER: Status: RESOLVED | Noted: 2022-02-08 | Resolved: 2025-03-11

## 2025-03-11 PROBLEM — S62.102D FRACTURE OF LEFT WRIST WITH ROUTINE HEALING: Status: RESOLVED | Noted: 2022-02-08 | Resolved: 2025-03-11

## 2025-03-11 PROBLEM — Z12.11 COLON CANCER SCREENING: Status: RESOLVED | Noted: 2017-02-09 | Resolved: 2025-03-11

## 2025-03-11 PROBLEM — S52.90XA FRACTURE OF RADIUS: Status: RESOLVED | Noted: 2018-09-07 | Resolved: 2025-03-11

## 2025-03-28 ENCOUNTER — HOSPITAL ENCOUNTER (OUTPATIENT)
Dept: RADIOLOGY | Facility: HOSPITAL | Age: 75
Discharge: HOME | End: 2025-03-28
Attending: FAMILY MEDICINE
Payer: MEDICARE

## 2025-03-28 DIAGNOSIS — Z12.31 ENCOUNTER FOR SCREENING MAMMOGRAM FOR BREAST CANCER: ICD-10-CM

## 2025-03-28 PROCEDURE — 77067 SCR MAMMO BI INCL CAD: CPT | Mod: 26,,, | Performed by: RADIOLOGY

## 2025-03-28 PROCEDURE — 77063 BREAST TOMOSYNTHESIS BI: CPT | Mod: 26,,, | Performed by: RADIOLOGY

## 2025-03-28 PROCEDURE — 77063 BREAST TOMOSYNTHESIS BI: CPT | Mod: TC,PN

## 2025-08-14 ENCOUNTER — LAB VISIT (OUTPATIENT)
Dept: LAB | Facility: HOSPITAL | Age: 75
End: 2025-08-14
Attending: FAMILY MEDICINE
Payer: MEDICARE

## 2025-08-14 ENCOUNTER — OFFICE VISIT (OUTPATIENT)
Dept: PRIMARY CARE CLINIC | Facility: CLINIC | Age: 75
End: 2025-08-14
Payer: MEDICARE

## 2025-08-14 VITALS
HEIGHT: 59 IN | DIASTOLIC BLOOD PRESSURE: 82 MMHG | SYSTOLIC BLOOD PRESSURE: 128 MMHG | BODY MASS INDEX: 32.44 KG/M2 | HEART RATE: 65 BPM | WEIGHT: 160.94 LBS

## 2025-08-14 DIAGNOSIS — Z13.1 SCREENING FOR DIABETES MELLITUS: ICD-10-CM

## 2025-08-14 DIAGNOSIS — N18.31 STAGE 3A CHRONIC KIDNEY DISEASE: ICD-10-CM

## 2025-08-14 DIAGNOSIS — M81.0 OSTEOPOROSIS, UNSPECIFIED OSTEOPOROSIS TYPE, UNSPECIFIED PATHOLOGICAL FRACTURE PRESENCE: ICD-10-CM

## 2025-08-14 DIAGNOSIS — Z76.89 ENCOUNTER TO ESTABLISH CARE: Primary | ICD-10-CM

## 2025-08-14 DIAGNOSIS — E78.5 HYPERLIPIDEMIA, UNSPECIFIED HYPERLIPIDEMIA TYPE: ICD-10-CM

## 2025-08-14 DIAGNOSIS — E03.9 HYPOTHYROIDISM, UNSPECIFIED TYPE: ICD-10-CM

## 2025-08-14 DIAGNOSIS — R53.83 FATIGUE, UNSPECIFIED TYPE: ICD-10-CM

## 2025-08-14 LAB
25(OH)D3+25(OH)D2 SERPL-MCNC: 38 NG/ML (ref 30–96)
ABSOLUTE EOSINOPHIL (OHS): 0.19 K/UL
ABSOLUTE MONOCYTE (OHS): 0.32 K/UL (ref 0.3–1)
ABSOLUTE NEUTROPHIL COUNT (OHS): 2.87 K/UL (ref 1.8–7.7)
ALBUMIN SERPL BCP-MCNC: 4 G/DL (ref 3.5–5.2)
ALP SERPL-CCNC: 60 UNIT/L (ref 40–150)
ALT SERPL W/O P-5'-P-CCNC: 17 UNIT/L (ref 0–55)
ANION GAP (OHS): 9 MMOL/L (ref 8–16)
AST SERPL-CCNC: 30 UNIT/L (ref 0–50)
BASOPHILS # BLD AUTO: 0.03 K/UL
BASOPHILS NFR BLD AUTO: 0.7 %
BILIRUB SERPL-MCNC: 0.7 MG/DL (ref 0.1–1)
BUN SERPL-MCNC: 8 MG/DL (ref 8–23)
CALCIUM SERPL-MCNC: 9.1 MG/DL (ref 8.7–10.5)
CHLORIDE SERPL-SCNC: 105 MMOL/L (ref 95–110)
CHOLEST SERPL-MCNC: 191 MG/DL (ref 120–199)
CHOLEST/HDLC SERPL: 2.2 {RATIO} (ref 2–5)
CO2 SERPL-SCNC: 24 MMOL/L (ref 23–29)
CREAT SERPL-MCNC: 0.8 MG/DL (ref 0.5–1.4)
EAG (OHS): 103 MG/DL (ref 68–131)
ERYTHROCYTE [DISTWIDTH] IN BLOOD BY AUTOMATED COUNT: 12 % (ref 11.5–14.5)
GFR SERPLBLD CREATININE-BSD FMLA CKD-EPI: >60 ML/MIN/1.73/M2
GLUCOSE SERPL-MCNC: 84 MG/DL (ref 70–110)
HBA1C MFR BLD: 5.2 % (ref 4–5.6)
HCT VFR BLD AUTO: 42.4 % (ref 37–48.5)
HDLC SERPL-MCNC: 86 MG/DL (ref 40–75)
HDLC SERPL: 45 % (ref 20–50)
HGB BLD-MCNC: 14.2 GM/DL (ref 12–16)
IMM GRANULOCYTES # BLD AUTO: 0.01 K/UL (ref 0–0.04)
IMM GRANULOCYTES NFR BLD AUTO: 0.2 % (ref 0–0.5)
LDLC SERPL CALC-MCNC: 93 MG/DL (ref 63–159)
LYMPHOCYTES # BLD AUTO: 1.13 K/UL (ref 1–4.8)
MCH RBC QN AUTO: 31.8 PG (ref 27–31)
MCHC RBC AUTO-ENTMCNC: 33.5 G/DL (ref 32–36)
MCV RBC AUTO: 95 FL (ref 82–98)
NONHDLC SERPL-MCNC: 105 MG/DL
NUCLEATED RBC (/100WBC) (OHS): 0 /100 WBC
PLATELET # BLD AUTO: 169 K/UL (ref 150–450)
PMV BLD AUTO: 11 FL (ref 9.2–12.9)
POTASSIUM SERPL-SCNC: 4.5 MMOL/L (ref 3.5–5.1)
PROT SERPL-MCNC: 7 GM/DL (ref 6–8.4)
RBC # BLD AUTO: 4.46 M/UL (ref 4–5.4)
RELATIVE EOSINOPHIL (OHS): 4.2 %
RELATIVE LYMPHOCYTE (OHS): 24.8 % (ref 18–48)
RELATIVE MONOCYTE (OHS): 7 % (ref 4–15)
RELATIVE NEUTROPHIL (OHS): 63.1 % (ref 38–73)
SODIUM SERPL-SCNC: 138 MMOL/L (ref 136–145)
TRIGL SERPL-MCNC: 60 MG/DL (ref 30–150)
TSH SERPL-ACNC: 2.63 UIU/ML (ref 0.4–4)
WBC # BLD AUTO: 4.55 K/UL (ref 3.9–12.7)

## 2025-08-14 PROCEDURE — 99213 OFFICE O/P EST LOW 20 MIN: CPT | Mod: PBBFAC,PN | Performed by: PHYSICIAN ASSISTANT

## 2025-08-14 PROCEDURE — 83036 HEMOGLOBIN GLYCOSYLATED A1C: CPT

## 2025-08-14 PROCEDURE — G2211 COMPLEX E/M VISIT ADD ON: HCPCS | Mod: ,,, | Performed by: PHYSICIAN ASSISTANT

## 2025-08-14 PROCEDURE — 84443 ASSAY THYROID STIM HORMONE: CPT

## 2025-08-14 PROCEDURE — 82306 VITAMIN D 25 HYDROXY: CPT

## 2025-08-14 PROCEDURE — 36415 COLL VENOUS BLD VENIPUNCTURE: CPT | Mod: PN

## 2025-08-14 PROCEDURE — 80061 LIPID PANEL: CPT

## 2025-08-14 PROCEDURE — 99999 PR PBB SHADOW E&M-EST. PATIENT-LVL III: CPT | Mod: PBBFAC,,, | Performed by: PHYSICIAN ASSISTANT

## 2025-08-14 PROCEDURE — 80053 COMPREHEN METABOLIC PANEL: CPT

## 2025-08-14 PROCEDURE — 99214 OFFICE O/P EST MOD 30 MIN: CPT | Mod: S$PBB,,, | Performed by: PHYSICIAN ASSISTANT

## 2025-08-14 PROCEDURE — 85025 COMPLETE CBC W/AUTO DIFF WBC: CPT

## 2025-08-14 RX ORDER — PREDNISOLONE ACETATE 10 MG/ML
SUSPENSION/ DROPS OPHTHALMIC
COMMUNITY
Start: 2025-07-21 | End: 2025-08-14

## 2025-08-19 ENCOUNTER — PATIENT MESSAGE (OUTPATIENT)
Dept: PRIMARY CARE CLINIC | Facility: CLINIC | Age: 75
End: 2025-08-19
Payer: MEDICARE

## 2025-08-28 DIAGNOSIS — E03.9 HYPOTHYROIDISM, UNSPECIFIED TYPE: ICD-10-CM

## 2025-08-29 RX ORDER — LEVOTHYROXINE SODIUM 50 UG/1
50 TABLET ORAL
Qty: 90 TABLET | Refills: 0 | OUTPATIENT
Start: 2025-08-29